# Patient Record
Sex: FEMALE | Race: WHITE | NOT HISPANIC OR LATINO | Employment: OTHER | URBAN - METROPOLITAN AREA
[De-identification: names, ages, dates, MRNs, and addresses within clinical notes are randomized per-mention and may not be internally consistent; named-entity substitution may affect disease eponyms.]

---

## 2017-02-01 ENCOUNTER — OFFICE VISIT (OUTPATIENT)
Dept: INTERNAL MEDICINE | Facility: CLINIC | Age: 62
End: 2017-02-01
Payer: MEDICARE

## 2017-02-01 VITALS
DIASTOLIC BLOOD PRESSURE: 72 MMHG | WEIGHT: 178.38 LBS | SYSTOLIC BLOOD PRESSURE: 114 MMHG | HEART RATE: 80 BPM | BODY MASS INDEX: 28 KG/M2 | TEMPERATURE: 98 F | HEIGHT: 67 IN

## 2017-02-01 DIAGNOSIS — J20.9 ACUTE BRONCHITIS, UNSPECIFIED ORGANISM: Primary | ICD-10-CM

## 2017-02-01 PROCEDURE — 3078F DIAST BP <80 MM HG: CPT | Mod: S$GLB,,, | Performed by: INTERNAL MEDICINE

## 2017-02-01 PROCEDURE — 99214 OFFICE O/P EST MOD 30 MIN: CPT | Mod: 25,S$GLB,, | Performed by: INTERNAL MEDICINE

## 2017-02-01 PROCEDURE — 3074F SYST BP LT 130 MM HG: CPT | Mod: S$GLB,,, | Performed by: INTERNAL MEDICINE

## 2017-02-01 PROCEDURE — 96372 THER/PROPH/DIAG INJ SC/IM: CPT | Mod: S$GLB,,, | Performed by: INTERNAL MEDICINE

## 2017-02-01 PROCEDURE — 99999 PR PBB SHADOW E&M-EST. PATIENT-LVL IV: CPT | Mod: PBBFAC,,, | Performed by: INTERNAL MEDICINE

## 2017-02-01 RX ORDER — CODEINE PHOSPHATE AND GUAIFENESIN 10; 100 MG/5ML; MG/5ML
5 SOLUTION ORAL EVERY 6 HOURS PRN
Qty: 120 ML | Refills: 0 | Status: SHIPPED | OUTPATIENT
Start: 2017-02-01 | End: 2017-02-11

## 2017-02-01 RX ORDER — TRIAMCINOLONE ACETONIDE 40 MG/ML
40 INJECTION, SUSPENSION INTRA-ARTICULAR; INTRAMUSCULAR
Status: COMPLETED | OUTPATIENT
Start: 2017-02-01 | End: 2017-02-01

## 2017-02-01 RX ORDER — ALBUTEROL SULFATE 90 UG/1
2 AEROSOL, METERED RESPIRATORY (INHALATION) EVERY 6 HOURS PRN
Qty: 1 EACH | Refills: 1 | Status: SHIPPED | OUTPATIENT
Start: 2017-02-01 | End: 2018-06-28 | Stop reason: CLARIF

## 2017-02-01 RX ORDER — PREDNISONE 20 MG/1
TABLET ORAL
Qty: 12 TABLET | Refills: 0 | Status: SHIPPED | OUTPATIENT
Start: 2017-02-01 | End: 2017-03-21

## 2017-02-01 RX ORDER — LAMOTRIGINE 25 MG/1
200 TABLET ORAL 2 TIMES DAILY
COMMUNITY
Start: 2017-01-30 | End: 2018-06-28

## 2017-02-01 RX ADMIN — TRIAMCINOLONE ACETONIDE 40 MG: 40 INJECTION, SUSPENSION INTRA-ARTICULAR; INTRAMUSCULAR at 04:02

## 2017-02-01 NOTE — MR AVS SNAPSHOT
[facility] [address]  [address]  [address]  [facility] [address]  Phone: [phone]                  Elly Harris   2017 3:30 PM   Office Visit    Description:  Female : 1955   Provider:  Srikanth Preston MD   Department:  [facility] [address]           Reason for Visit     Cough           Diagnoses this Visit        Comments    Acute bronchitis, unspecified organism    -  Primary            To Do List           Future Appointments        Provider Department Dept Phone    2/15/2017 2:00 PM Srikanth Preston MD [facility] [address] [phone]      Goals (5 Years of Data)     None       These Medications        Disp Refills Start End    predniSONE (DELTASONE) 20 MG tablet 12 tablet 0 2017     2 pills po daily for 4 days, then 1 pill po day for 4 days    Pharmacy: [facility]/pharmacy #[id number] - [place] [address] [address] Ph #: [phone]       albuterol 90 mcg/actuation inhaler 1 each 1 2017     Inhale 2 puffs into the lungs every 6 (six) hours as needed for Wheezing. - Inhalation    Pharmacy: [facility]/pharmacy #[id number] - [place] - [address] Ph #: [phone]       guaifenesin-codeine 100-10 mg/5 ml (TUSSI-ORGANIDIN NR)  mg/5 mL syrup 120 mL 0 2017    Take 5 mLs by mouth every 6 (six) hours as needed for Cough. - Oral    Pharmacy: [facility]/pharmacy #[id number] - [place] [address] [address] Ph #: [phone]         [facility]     [facility] Nurse Care [facility] -  Assistance  Registered nurses in the [facility] On Call Center provide clinical advisement, health education, appointment booking, and other advisory services.  Call for this free service at [phone].             Medications           Message regarding Medications     Verify the changes and/or additions to your medication regime listed below are the same as discussed with your clinician today.  If any of these changes or additions are  incorrect, please notify your healthcare provider.        START taking these NEW medications        Refills    predniSONE (DELTASONE) 20 MG tablet 0    Si pills po daily for 4 days, then 1 pill po day for 4 days    Class: Normal    albuterol 90 mcg/actuation inhaler 1    Sig: Inhale 2 puffs into the lungs every 6 (six) hours as needed for Wheezing.    Class: Normal    Route: Inhalation    guaifenesin-codeine 100-10 mg/5 ml (TUSSI-ORGANIDIN NR)  mg/5 mL syrup 0    Sig: Take 5 mLs by mouth every 6 (six) hours as needed for Cough.    Class: Normal    Route: Oral      These medications were administered today        Dose Freq    triamcinolone acetonide injection 40 mg 40 mg Clinic/HOD 1 time    Sig: Inject 1 mL (40 mg total) into the muscle one time.    Class: Normal    Route: Intramuscular      STOP taking these medications     hydrocodone-acetaminophen 10-325mg (NORCO)  mg Tab Take 1 tablet by mouth every 6 (six) hours as needed.    ranitidine (ZANTAC) 150 MG tablet Take 150 mg by mouth 2 (two) times daily.           Verify that the below list of medications is an accurate representation of the medications you are currently taking.  If none reported, the list may be blank. If incorrect, please contact your healthcare provider. Carry this list with you in case of emergency.           Current Medications     albuterol 90 mcg/actuation inhaler Inhale 2 puffs into the lungs every 6 (six) hours as needed for Wheezing.    atorvastatin (LIPITOR) 20 MG tablet Take 1 tablet (20 mg total) by mouth once daily.    azelastine (ASTELIN) 137 mcg nasal spray 1 SPRAY (137 MCG TOTAL) BY NASAL ROUTE 2 (TWO) TIMES DAILY.    cyclobenzaprine (FLEXERIL) 10 MG tablet Take 10 mg by mouth 3 (three) times daily as needed.     duloxetine (CYMBALTA) 60 MG capsule Take 120 mg by mouth once daily.    estradiol (ESTRACE) 1 MG tablet Take 1 tablet (1 mg total) by mouth once daily.    folic acid (FOLVITE) 1 MG tablet Take 1 mg by mouth  "once daily.    gabapentin (NEURONTIN) 300 MG capsule Take 1 capsule (300 mg total) by mouth 3 (three) times daily.    guaifenesin-codeine 100-10 mg/5 ml (TUSSI-ORGANIDIN NR)  mg/5 mL syrup Take 5 mLs by mouth every 6 (six) hours as needed for Cough.    ibandronate (BONIVA) 150 mg tablet Take 1 tablet (150 mg total) by mouth every 30 days.    lamotrigine (LAMICTAL) 25 MG tablet Take 200 mg by mouth 2 (two) times daily.     lorazepam (ATIVAN) 0.5 MG tablet Take 1 tablet (0.5 mg total) by mouth daily as needed.    losartan-hydrochlorothiazide 50-12.5 mg (HYZAAR) 50-12.5 mg per tablet TAKE 1 TABLET BY MOUTH ONCE DAILY.    losartan-hydrochlorothiazide 50-12.5 mg (HYZAAR) 50-12.5 mg per tablet TAKE 1 TABLET EVERY DAY    losartan-hydrochlorothiazide 50-12.5 mg (HYZAAR) 50-12.5 mg per tablet Take 1 tablet by mouth once daily.    naproxen (NAPROSYN) 500 MG tablet TAKE 1 TABLET (500 MG TOTAL) BY MOUTH 2 (TWO) TIMES DAILY WITH MEALS.    NEXIUM 40 mg capsule Take 40 mg by mouth once daily.    predniSONE (DELTASONE) 20 MG tablet 2 pills po daily for 4 days, then 1 pill po day for 4 days    sumatriptan (IMITREX) 100 MG tablet Take 1 tablet (100 mg total) by mouth every 2 (two) hours as needed for Migraine.    tobramycin-dexamethasone 0.3-0.1% (TOBRADEX) 0.3-0.1 % DrpS Place 1 drop into the right eye every 4 (four) hours while awake.    valacyclovir (VALTREX) 1000 MG tablet Take 1 tablet (1,000 mg total) by mouth 3 (three) times daily.           Clinical Reference Information           Vital Signs - Last Recorded  Most recent update: 2/1/2017  3:49 PM by Andrea Abbott MA    BP Pulse Temp Ht Wt BMI    114/72 (BP Location: Left arm, Patient Position: Sitting, BP Method: Automatic) 80 97.7 °F (36.5 °C) (Oral) 5' 6.5" (1.689 m) 80.9 kg (178 lb 6.4 oz) 28.36 kg/m2      Blood Pressure          Most Recent Value    BP  114/72      Allergies as of 2/1/2017     Doxycycline      Immunizations Administered on Date of Encounter - " 2/1/2017     None

## 2017-02-23 RX ORDER — ESTRADIOL 1 MG/1
1 TABLET ORAL DAILY
Qty: 30 TABLET | Refills: 11 | Status: SHIPPED | OUTPATIENT
Start: 2017-02-23 | End: 2017-04-28 | Stop reason: SDUPTHER

## 2017-03-21 ENCOUNTER — OFFICE VISIT (OUTPATIENT)
Dept: INTERNAL MEDICINE | Facility: CLINIC | Age: 62
End: 2017-03-21
Payer: MEDICARE

## 2017-03-21 VITALS
HEART RATE: 84 BPM | HEIGHT: 66 IN | SYSTOLIC BLOOD PRESSURE: 102 MMHG | WEIGHT: 185 LBS | DIASTOLIC BLOOD PRESSURE: 64 MMHG | BODY MASS INDEX: 29.73 KG/M2

## 2017-03-21 DIAGNOSIS — N39.46 MIXED STRESS AND URGE URINARY INCONTINENCE: ICD-10-CM

## 2017-03-21 DIAGNOSIS — M47.26 OSTEOARTHRITIS OF SPINE WITH RADICULOPATHY, LUMBAR REGION: Primary | ICD-10-CM

## 2017-03-21 DIAGNOSIS — R20.0 NUMBNESS OF LEFT FOOT: ICD-10-CM

## 2017-03-21 DIAGNOSIS — R39.15 URINARY URGENCY: ICD-10-CM

## 2017-03-21 LAB
BACTERIA #/AREA URNS AUTO: NORMAL /HPF
BILIRUB UR QL STRIP: NEGATIVE
CAOX CRY UR QL COMP ASSIST: NORMAL
CLARITY UR REFRACT.AUTO: ABNORMAL
COLOR UR AUTO: YELLOW
GLUCOSE UR QL STRIP: NEGATIVE
HGB UR QL STRIP: NEGATIVE
HYALINE CASTS UR QL AUTO: 1 /LPF
KETONES UR QL STRIP: NEGATIVE
LEUKOCYTE ESTERASE UR QL STRIP: ABNORMAL
MICROSCOPIC COMMENT: NORMAL
NITRITE UR QL STRIP: NEGATIVE
PH UR STRIP: 5 [PH] (ref 5–8)
PROT UR QL STRIP: NEGATIVE
RBC #/AREA URNS AUTO: 2 /HPF (ref 0–4)
SP GR UR STRIP: 1.03 (ref 1–1.03)
SQUAMOUS #/AREA URNS AUTO: 3 /HPF
URN SPEC COLLECT METH UR: ABNORMAL
UROBILINOGEN UR STRIP-ACNC: NEGATIVE EU/DL
WBC #/AREA URNS AUTO: 4 /HPF (ref 0–5)

## 2017-03-21 PROCEDURE — 3074F SYST BP LT 130 MM HG: CPT | Mod: S$GLB,,, | Performed by: INTERNAL MEDICINE

## 2017-03-21 PROCEDURE — 1160F RVW MEDS BY RX/DR IN RCRD: CPT | Mod: S$GLB,,, | Performed by: INTERNAL MEDICINE

## 2017-03-21 PROCEDURE — 87086 URINE CULTURE/COLONY COUNT: CPT

## 2017-03-21 PROCEDURE — 81001 URINALYSIS AUTO W/SCOPE: CPT

## 2017-03-21 PROCEDURE — 99214 OFFICE O/P EST MOD 30 MIN: CPT | Mod: S$GLB,,, | Performed by: INTERNAL MEDICINE

## 2017-03-21 PROCEDURE — 99999 PR PBB SHADOW E&M-EST. PATIENT-LVL V: CPT | Mod: PBBFAC,,, | Performed by: INTERNAL MEDICINE

## 2017-03-21 PROCEDURE — 3078F DIAST BP <80 MM HG: CPT | Mod: S$GLB,,, | Performed by: INTERNAL MEDICINE

## 2017-03-21 RX ORDER — RISPERIDONE 1 MG/1
1 TABLET ORAL NIGHTLY
Refills: 0 | COMMUNITY
Start: 2017-03-09 | End: 2021-02-25

## 2017-03-21 NOTE — MR AVS SNAPSHOT
[facility] [address]  [address]  [address]  [facility] [address]  Phone: [phone]                  Elly Harris   3/21/2017 3:45 PM   Office Visit    Description:  Female : 1955   Provider:  Srikanth Preston MD   Department:  [facility] [address]           Reason for Visit     Back Pain     Numbness     Urinary Frequency           Diagnoses this Visit        Comments    Osteoarthritis of spine with radiculopathy, lumbar region    -  Primary     Numbness of left foot         Urinary urgency         Mixed stress and urge urinary incontinence                To Do List           Goals (5 Years of Data)     None      [facility]     [facility] Nurse Care Line -  Assistance  Registered nurses in the [facility] On Call Center provide clinical advisement, health education, appointment booking, and other advisory services.  Call for this free service at [phone].             Medications           Message regarding Medications     Verify the changes and/or additions to your medication regime listed below are the same as discussed with your clinician today.  If any of these changes or additions are incorrect, please notify your healthcare provider.        STOP taking these medications     predniSONE (DELTASONE) 20 MG tablet 2 pills po daily for 4 days, then 1 pill po day for 4 days           Verify that the below list of medications is an accurate representation of the medications you are currently taking.  If none reported, the list may be blank. If incorrect, please contact your healthcare provider. Carry this list with you in case of emergency.           Current Medications     albuterol 90 mcg/actuation inhaler Inhale 2 puffs into the lungs every 6 (six) hours as needed for Wheezing.    atorvastatin (LIPITOR) 20 MG tablet Take 1 tablet (20 mg total) by mouth once daily.    azelastine (ASTELIN) 137 mcg nasal spray 1 SPRAY (137 MCG TOTAL) BY NASAL  "ROUTE 2 (TWO) TIMES DAILY.    cyclobenzaprine (FLEXERIL) 10 MG tablet Take 10 mg by mouth 3 (three) times daily as needed.     duloxetine (CYMBALTA) 60 MG capsule Take 120 mg by mouth once daily.    estradiol (ESTRACE) 1 MG tablet Take 1 tablet (1 mg total) by mouth once daily.    folic acid (FOLVITE) 1 MG tablet Take 1 mg by mouth once daily.    gabapentin (NEURONTIN) 300 MG capsule Take 1 capsule (300 mg total) by mouth 3 (three) times daily.    ibandronate (BONIVA) 150 mg tablet Take 1 tablet (150 mg total) by mouth every 30 days.    lamotrigine (LAMICTAL) 25 MG tablet Take 200 mg by mouth 2 (two) times daily.     lorazepam (ATIVAN) 0.5 MG tablet Take 1 tablet (0.5 mg total) by mouth daily as needed.    losartan-hydrochlorothiazide 50-12.5 mg (HYZAAR) 50-12.5 mg per tablet TAKE 1 TABLET BY MOUTH ONCE DAILY.    losartan-hydrochlorothiazide 50-12.5 mg (HYZAAR) 50-12.5 mg per tablet TAKE 1 TABLET EVERY DAY    losartan-hydrochlorothiazide 50-12.5 mg (HYZAAR) 50-12.5 mg per tablet Take 1 tablet by mouth once daily.    naproxen (NAPROSYN) 500 MG tablet TAKE 1 TABLET (500 MG TOTAL) BY MOUTH 2 (TWO) TIMES DAILY WITH MEALS.    NEXIUM 40 mg capsule Take 40 mg by mouth once daily.    ranitidine (ZANTAC) 150 MG tablet Take 150 mg by mouth 2 (two) times daily.    risperidone (RISPERDAL) 1 MG tablet Take 1 mg by mouth every evening.    sumatriptan (IMITREX) 100 MG tablet Take 1 tablet (100 mg total) by mouth every 2 (two) hours as needed for Migraine.    tobramycin-dexamethasone 0.3-0.1% (TOBRADEX) 0.3-0.1 % DrpS Place 1 drop into the right eye every 4 (four) hours while awake.    valacyclovir (VALTREX) 1000 MG tablet Take 1 tablet (1,000 mg total) by mouth 3 (three) times daily.           Clinical Reference Information           Your Vitals Were     BP Pulse Height Weight BMI    102/64 84 5' 6" (1.676 m) 83.9 kg (185 lb) 29.86 kg/m2      Blood Pressure          Most Recent Value    BP  102/64      Allergies as of 3/21/2017  "    Doxycycline      Immunizations Administered on Date of Encounter - 3/21/2017     None      Orders Placed During Today's Visit      Normal Orders This Visit    Ambulatory Referral to Urogynecology     Urinalysis     Urine culture     Future Labs/Procedures Expected by Expires    EMG w/ Ultrasound  As directed 6/19/2017      Language Assistance Services     ATTENTION: Language assistance services are available, free of charge. Please call [phone].      ATENCIÓN: Si habla español, tiene a guaman disposición servicios gratuitos de asistencia lingüística. Llame al [phone].     [facility] Ý: N?u b?n nói Ti?ng Vi?t, có các d?ch v? h? tr? ngôn ng? mi?n phí dành cho b?n. G?i s? [phone].         [facility] [address] [facility] [address] complies with applicable Federal civil rights laws and does not discriminate on the basis of race, color, national origin, age, disability, or sex.
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
## 2017-03-23 LAB — BACTERIA UR CULT: NORMAL

## 2017-03-25 ENCOUNTER — PATIENT MESSAGE (OUTPATIENT)
Dept: INTERNAL MEDICINE | Facility: CLINIC | Age: 62
End: 2017-03-25

## 2017-03-27 ENCOUNTER — PATIENT MESSAGE (OUTPATIENT)
Dept: INTERNAL MEDICINE | Facility: CLINIC | Age: 62
End: 2017-03-27

## 2017-03-27 DIAGNOSIS — N39.46 MIXED STRESS AND URGE URINARY INCONTINENCE: ICD-10-CM

## 2017-03-27 DIAGNOSIS — R39.15 URINARY URGENCY: Primary | ICD-10-CM

## 2017-05-01 ENCOUNTER — PATIENT MESSAGE (OUTPATIENT)
Dept: INTERNAL MEDICINE | Facility: CLINIC | Age: 62
End: 2017-05-01

## 2017-05-01 RX ORDER — SUMATRIPTAN SUCCINATE 100 MG/1
TABLET ORAL
Qty: 9 TABLET | Refills: 3 | Status: SHIPPED | OUTPATIENT
Start: 2017-05-01 | End: 2018-09-09 | Stop reason: SDUPTHER

## 2017-05-01 RX ORDER — ESTRADIOL 1 MG/1
1 TABLET ORAL DAILY
Qty: 30 TABLET | Refills: 11 | Status: SHIPPED | OUTPATIENT
Start: 2017-05-01 | End: 2018-06-28 | Stop reason: CLARIF

## 2017-05-03 ENCOUNTER — PATIENT MESSAGE (OUTPATIENT)
Dept: INTERNAL MEDICINE | Facility: CLINIC | Age: 62
End: 2017-05-03

## 2017-07-11 ENCOUNTER — OFFICE VISIT (OUTPATIENT)
Dept: URGENT CARE | Facility: CLINIC | Age: 62
End: 2017-07-11
Payer: MEDICARE

## 2017-07-11 VITALS
RESPIRATION RATE: 16 BRPM | HEIGHT: 67 IN | OXYGEN SATURATION: 98 % | DIASTOLIC BLOOD PRESSURE: 77 MMHG | TEMPERATURE: 98 F | WEIGHT: 185 LBS | HEART RATE: 89 BPM | SYSTOLIC BLOOD PRESSURE: 123 MMHG | BODY MASS INDEX: 29.03 KG/M2

## 2017-07-11 DIAGNOSIS — S46.911A SHOULDER STRAIN, RIGHT, INITIAL ENCOUNTER: ICD-10-CM

## 2017-07-11 DIAGNOSIS — S93.601A SPRAIN OF FOOT, RIGHT, INITIAL ENCOUNTER: ICD-10-CM

## 2017-07-11 DIAGNOSIS — S82.892A: Primary | ICD-10-CM

## 2017-07-11 PROCEDURE — 99203 OFFICE O/P NEW LOW 30 MIN: CPT | Mod: S$GLB,,, | Performed by: PHYSICIAN ASSISTANT

## 2017-07-11 RX ORDER — HYDROCODONE BITARTRATE AND ACETAMINOPHEN 5; 325 MG/1; MG/1
1 TABLET ORAL EVERY 6 HOURS PRN
Qty: 20 TABLET | Refills: 0 | Status: SHIPPED | OUTPATIENT
Start: 2017-07-11 | End: 2021-02-25

## 2017-07-11 NOTE — PROGRESS NOTES
Subjective:       Patient ID: Elly Harris is a 62 y.o. female.    Chief Complaint: Shoulder Injury; Ankle Injury; and Foot Injury    Pt fell down 2 steps. Reports pain to the right shoulder/upper arm, right foot and left ankle and foot. Denies hitting head or LOC.       Shoulder Injury    The incident occurred at home. The right shoulder is affected. The incident occurred 6 to 12 hours ago. The injury mechanism was a fall. The quality of the pain is described as aching and shooting. The pain radiates to the right arm. The pain is at a severity of 4/10. The pain is mild. Associated symptoms include muscle weakness. Pertinent negatives include no numbness or tingling. The symptoms are aggravated by movement. She has tried ice for the symptoms.   Ankle Injury    Associated symptoms include muscle weakness. Pertinent negatives include no numbness or tingling.   Foot Injury    Associated symptoms include muscle weakness. Pertinent negatives include no numbness or tingling.     Review of Systems   Cardiovascular: Negative for syncope.   Skin: Positive for color change.   Musculoskeletal: Positive for falls, joint pain (left ankle, foot; right foot and right shoulder), joint swelling and stiffness. Negative for back pain.   Neurological: Negative for dizziness, focal weakness, light-headedness, loss of balance, numbness and tingling.       Objective:      Physical Exam   Constitutional: She appears well-developed and well-nourished.   HENT:   Head: Normocephalic and atraumatic.   Neck: Normal range of motion. Neck supple.   Cardiovascular: Normal rate, regular rhythm, normal heart sounds and intact distal pulses.    Pulmonary/Chest: Effort normal and breath sounds normal.   Musculoskeletal: She exhibits edema.        Right shoulder: She exhibits decreased range of motion, tenderness and pain. She exhibits no swelling, no effusion, no crepitus, no deformity and no laceration.        Right ankle: She exhibits decreased  range of motion. She exhibits no swelling and no ecchymosis. Tenderness.        Left ankle: She exhibits decreased range of motion, swelling and ecchymosis. Tenderness.   Skin: Skin is warm and dry.       Assessment:       1. Fracture of malleolus of left ankle, closed, initial encounter    2. Shoulder strain, right, initial encounter    3. Sprain of foot, right, initial encounter        Plan:       Fracture of malleolus of left ankle, closed, initial encounter  -     X-Ray Foot Complete Left; Future; Expected date: 07/11/2017  -     X-Ray Ankle Complete Left; Future; Expected date: 07/11/2017  -     X-Ray Foot 2 View Right; Future; Expected date: 07/11/2017  -     POST-SURGICAL BOOT/SHOE FOR HOME USE    Shoulder strain, right, initial encounter  -     X-Ray Shoulder Trauma 3 view Right; Future; Expected date: 07/11/2017    Sprain of foot, right, initial encounter    Other orders  -     hydrocodone-acetaminophen 5-325mg (NORCO) 5-325 mg per tablet; Take 1 tablet by mouth every 6 (six) hours as needed for Pain.  Dispense: 20 tablet; Refill: 0

## 2017-07-11 NOTE — PATIENT INSTRUCTIONS
Leg Fracture    You have a break (fracture) of the leg. A fracture is treated with a splint, cast, or special boot. It will take at about 4 to 6 weeks for the fracture to heal. If you have a severe injury, you may need surgery to fix it.  Home care  Follow these guidelines when caring for yourself at home:  · You will be given a splint, cast, boot, or other device to keep the injured area from moving. Unless you were told otherwise, use crutches or a walker. Dont put weight on the injured leg until your health care provider says you can do so. (You can rent crutches and a walker at many pharmacies and surgical or orthopedic supply stores.)  · Keep your leg elevated to reduce pain and swelling. When sleeping, put a pillow under the injured leg. When sitting, support the injured leg so it is level with your waist. This is very important during the first 2 days (48 hours).  · Put an ice pack on the injured area. Do this for 20 minutes every 1 to 2 hours the first day for pain relief. You can make an ice pack by wrapping a plastic bag of ice cubes in a thin towel. As the ice melts, be careful that the cast/splint/boot doesnt get wet. You can put the ice pack directly over the splint or cast. Continue using the ice pack 3 to 4 times a day for the next 2 days. Then use the ice pack as needed to ease pain and swelling.  · Keep the cast/splint/boot completely dry at all times. Bathe with your cast/splint/boot out of the water. Protect it with a large plastic bag, rubber-banded at the top end. If a boot or fiberglass cast or splint gets wet, you can dry it with a hair dryer.  · You may use acetaminophen or ibuprofen to control pain, unless another pain medicine was prescribed. If you have chronic liver or kidney disease, talk with your health care provider before using these medicines. Also talk with your provider if youve had a stomach ulcer or GI bleeding.  · Dont put creams or objects under the cast if you have  itching.  Follow-up care  Follow up with your health care provider in 1 week, or as advised. This is to make sure the bone is healing the way it should. If a splint was put on, it may be converted to a cast at your next visit.  If X-rays were taken, a radiologist will look at them. You will be told of any new findings that may affect your care.  When to seek medical advice  Call your health care provider right away if any of these occur:  · The cast cracks  · The plaster cast or splint becomes wet or soft  · The fiberglass cast or splint stays wet for more than 24 hours  · Bad odor from the cast or wound fluid stains the cast  · Tightness or pain under the cast or splint gets worse  · Toes become swollen, cold, blue, numb, or tingly  · You cant move your toes  · Skin around cast becomes red  · Fever of 101ºF (38.3ºC) or higher, or as directed by your health care provider  Date Last Reviewed: 2/15/2015  © 8357-7157 [employer]. [address] [place] [address]. All rights reserved. This information is not intended as a substitute for professional medical care. Always follow your healthcare professional's instructions.        Shoulder Sprain  A sprain is a stretching or tearing of the ligaments that hold a joint together. A sprain may take up to 8 weeks to fully heal, depending on how severe it is. Moderate to severe shoulder sprains are treated with a sling or shoulder immobilizer. Minor sprains can be treated without any special support.  Home care  The following guidelines will help you care for your injury at home:  · If a sling was given to you, leave it in place for the time advised by your healthcare provider. If you arent sure how long to wear it, ask for advice. If the sling becomes loose, adjust it so that your forearm is level with the ground. Your shoulder should feel well supported.  · Put an ice pack on the injured area for 20 minutes every 1 to 2 hours the first day. You can  make your own ice pack by putting ice cubes in a plastic bag. A bag of frozen peas or something similar works well too. Wrap the bag in a thin towel. Continue with ice packs 3 to 4 times a day for the next 2 to 3 days. Then use the pack as needed to ease pain and swelling.  · You may use acetaminophen or ibuprofen to control pain, unless another pain medicine was prescribed. If you have chronic liver or kidney disease, talk with your healthcare provider before using these medicines. Also talk with your provider if youve had a stomach ulcer or gastrointestinal bleeding.  · Shoulder joints become stiff if left in a sling for too long. You should start range of motion exercises about 7 to 10 days after the injury. Talk with your provider to find out what type of exercises to do and how soon to start.  Follow-up care  Follow up with your healthcare provider, or as advised.  Any X-rays you had today dont show any broken bones, breaks, or fractures. Sometimes fractures dont show up on the first X-ray. Bruises and sprains can sometimes hurt as much as a fracture. These injuries can take time to heal completely. If your symptoms dont improve or they get worse, talk with your provider. You may need a repeat X-ray or other treatments.  When to seek medical advice  Call your healthcare provider right away if any of these occur:  · Shoulder pain or swelling in your arm that gets worse  · Fingers become cold, blue, numb, or tingly  · Large amount of bruising of the shoulder or upper arm  · Fever or chills  Date Last Reviewed: 8/1/2016 © 2000-2016 [employer]. [address] [place] [address], [place] [address]. All rights reserved. This information is not intended as a substitute for professional medical care. Always follow your healthcare professional's instructions.        Foot Sprain    A sprain is a stretching or tearing of the ligaments that hold a joint together. There are no broken bones. Sprains generally take  from 3-6 weeks to heal. A sprain may be treated with a splint, walking cast, or special boot. Mild sprains may not need any additional support.  Home care  The following guidelines will help you care for your injury at home:  · Keep your leg elevated when sitting or lying down. This is very important during the first 48 hours to reduce swelling. Stay off the injured foot as much as possible until you can walk on it without pain. If needed, you may use crutches during the first week for this purpose. Crutches can be rented at many pharmacies or surgical/orthopedic supply stores.  · You may be given a cast shoe to wear to prevent movement in your foot. If not, you can use a sandal or any shoe that does not put pressure on the injured area until the swelling and pain go away. If using a sandal, be careful not to hit your foot against anything, since another injury could make the sprain worse.  · Apply an ice pack over the injured area for 15 to 20 minutes every 3 to 6 hours. You should do this for the first 24 to 48 hours. You can make an ice pack by filling a plastic bag that seals at the top with ice cubes and then wrapping it with a thin towel. Continue to use ice packs for relief of pain and swelling as needed. As the ice melts, avoid getting any wrap, splint, or cast wet. After 48 hours, apply heat from a warm shower or bath for 20 minutes several times daily. Alternating ice and heat may also be helpful.  · You may use over-the-counter pain medicine to control pain, unless another medicine was prescribed. If you have chronic liver or kidney disease or ever had a stomach ulcer or GI bleeding, talk with your healthcare provider before using these medicines.  · If you were given a splint or cast, keep it dry. Bathe with your splint or cast well out of the water, protected with 2 large plastic bags, rubber-banded at the top end. If a fiberglass splint or cast gets wet, you can dry it with a hair dryer.  · You may  return to sports after healing, when you can run without pain.  Follow-up care  Follow up with your healthcare provider as directed. Sometimes fractures dont show up on the first X-ray. Bruises and sprains can sometimes hurt as much as a fracture. These injuries can take time to heal completely. If your symptoms dont improve or they get worse, talk with your healthcare provider. You may need a repeat X-ray.  When to seek medical advice  Call your healthcare provider right away if any of these occur:  · The plaster cast or splint gets wet or soft  · The fiberglass cast or splint gets wet and does not dry for 24 hours  · Pain or swelling increases, or redness appears  · A bad odor comes from within the cast  · Fever of 100.4°F (38°C) or above lasting for 24 to 48 hours  · Toes on the injured foot become cold, blue, numb, or tingly  Date Last Reviewed: 11/20/2015  © 8341-1358 [employer]. [address] [place] [address], [place] [address]. All rights reserved. This information is not intended as a substitute for professional medical care. Always follow your healthcare professional's instructions.

## 2017-07-23 RX ORDER — LOSARTAN POTASSIUM AND HYDROCHLOROTHIAZIDE 12.5; 5 MG/1; MG/1
TABLET ORAL
Qty: 30 TABLET | Refills: 5 | Status: SHIPPED | OUTPATIENT
Start: 2017-07-23 | End: 2017-08-28 | Stop reason: SDUPTHER

## 2017-08-29 RX ORDER — LOSARTAN POTASSIUM AND HYDROCHLOROTHIAZIDE 12.5; 5 MG/1; MG/1
1 TABLET ORAL DAILY
Qty: 90 TABLET | Refills: 1 | Status: SHIPPED | OUTPATIENT
Start: 2017-08-29 | End: 2018-06-28 | Stop reason: CLARIF

## 2017-08-29 RX ORDER — NAPROXEN 500 MG/1
TABLET ORAL
Qty: 120 TABLET | Refills: 0 | Status: SHIPPED | OUTPATIENT
Start: 2017-08-29 | End: 2017-08-31 | Stop reason: SDUPTHER

## 2017-08-31 RX ORDER — NAPROXEN 500 MG/1
TABLET ORAL
Qty: 180 TABLET | Refills: 0 | Status: SHIPPED | OUTPATIENT
Start: 2017-08-31 | End: 2018-03-22 | Stop reason: SDUPTHER

## 2017-10-17 RX ORDER — GABAPENTIN 300 MG/1
300 CAPSULE ORAL 3 TIMES DAILY
Qty: 90 CAPSULE | Refills: 3 | Status: SHIPPED | OUTPATIENT
Start: 2017-10-17 | End: 2018-04-18 | Stop reason: SDUPTHER

## 2017-10-21 RX ORDER — ESOMEPRAZOLE MAGNESIUM 40 MG/1
CAPSULE, DELAYED RELEASE ORAL
Qty: 30 CAPSULE | Refills: 6 | Status: SHIPPED | OUTPATIENT
Start: 2017-10-21 | End: 2018-07-16 | Stop reason: SDUPTHER

## 2018-01-02 RX ORDER — IBANDRONATE SODIUM 150 MG/1
TABLET, FILM COATED ORAL
Qty: 1 TABLET | Refills: 5 | Status: SHIPPED | OUTPATIENT
Start: 2018-01-02 | End: 2018-06-16 | Stop reason: SDUPTHER

## 2018-01-02 RX ORDER — ATORVASTATIN CALCIUM 20 MG/1
20 TABLET, FILM COATED ORAL DAILY
Qty: 30 TABLET | Refills: 5 | Status: SHIPPED | OUTPATIENT
Start: 2018-01-02 | End: 2018-06-14 | Stop reason: SDUPTHER

## 2018-03-09 ENCOUNTER — PATIENT MESSAGE (OUTPATIENT)
Dept: INTERNAL MEDICINE | Facility: CLINIC | Age: 63
End: 2018-03-09

## 2018-03-09 DIAGNOSIS — R32 URINARY INCONTINENCE, UNSPECIFIED TYPE: Primary | ICD-10-CM

## 2018-03-09 DIAGNOSIS — R35.1 NOCTURIA: ICD-10-CM

## 2018-03-22 RX ORDER — NAPROXEN 500 MG/1
TABLET ORAL
Qty: 180 TABLET | Refills: 0 | Status: SHIPPED | OUTPATIENT
Start: 2018-03-22 | End: 2018-06-29

## 2018-04-18 RX ORDER — GABAPENTIN 300 MG/1
300 CAPSULE ORAL 3 TIMES DAILY
Qty: 90 CAPSULE | Refills: 3 | Status: SHIPPED | OUTPATIENT
Start: 2018-04-18 | End: 2018-04-19 | Stop reason: SDUPTHER

## 2018-04-19 RX ORDER — GABAPENTIN 300 MG/1
300 CAPSULE ORAL 3 TIMES DAILY
Qty: 90 CAPSULE | Refills: 3 | Status: SHIPPED | OUTPATIENT
Start: 2018-04-19 | End: 2021-02-25

## 2018-05-05 RX ORDER — GABAPENTIN 300 MG/1
300 CAPSULE ORAL 3 TIMES DAILY
Qty: 90 CAPSULE | Refills: 2 | Status: SHIPPED | OUTPATIENT
Start: 2018-05-05 | End: 2018-11-02 | Stop reason: SDUPTHER

## 2018-06-14 RX ORDER — ATORVASTATIN CALCIUM 20 MG/1
20 TABLET, FILM COATED ORAL DAILY
Qty: 30 TABLET | Refills: 5 | Status: SHIPPED | OUTPATIENT
Start: 2018-06-14 | End: 2018-12-22 | Stop reason: SDUPTHER

## 2018-06-19 RX ORDER — IBANDRONATE SODIUM 150 MG/1
TABLET, FILM COATED ORAL
Qty: 1 TABLET | Refills: 3 | Status: SHIPPED | OUTPATIENT
Start: 2018-06-19 | End: 2018-10-09 | Stop reason: SDUPTHER

## 2018-06-28 ENCOUNTER — OFFICE VISIT (OUTPATIENT)
Dept: INTERNAL MEDICINE | Facility: CLINIC | Age: 63
End: 2018-06-28
Payer: MEDICARE

## 2018-06-28 ENCOUNTER — LAB VISIT (OUTPATIENT)
Dept: LAB | Facility: HOSPITAL | Age: 63
End: 2018-06-28
Attending: INTERNAL MEDICINE
Payer: MEDICARE

## 2018-06-28 ENCOUNTER — PATIENT MESSAGE (OUTPATIENT)
Dept: INTERNAL MEDICINE | Facility: CLINIC | Age: 63
End: 2018-06-28

## 2018-06-28 VITALS
DIASTOLIC BLOOD PRESSURE: 62 MMHG | BODY MASS INDEX: 32.18 KG/M2 | HEART RATE: 95 BPM | OXYGEN SATURATION: 98 % | WEIGHT: 205 LBS | SYSTOLIC BLOOD PRESSURE: 100 MMHG | HEIGHT: 67 IN

## 2018-06-28 DIAGNOSIS — M79.7 FIBROMYALGIA: ICD-10-CM

## 2018-06-28 DIAGNOSIS — Z79.899 OTHER LONG TERM (CURRENT) DRUG THERAPY: ICD-10-CM

## 2018-06-28 DIAGNOSIS — F32.A DEPRESSION, UNSPECIFIED DEPRESSION TYPE: ICD-10-CM

## 2018-06-28 DIAGNOSIS — I10 ESSENTIAL HYPERTENSION: ICD-10-CM

## 2018-06-28 DIAGNOSIS — Z01.810 PREOP CARDIOVASCULAR EXAM: ICD-10-CM

## 2018-06-28 DIAGNOSIS — D50.9 MICROCYTIC ANEMIA: ICD-10-CM

## 2018-06-28 DIAGNOSIS — G43.909 MIGRAINE WITHOUT STATUS MIGRAINOSUS, NOT INTRACTABLE, UNSPECIFIED MIGRAINE TYPE: ICD-10-CM

## 2018-06-28 DIAGNOSIS — N39.46 MIXED STRESS AND URGE URINARY INCONTINENCE: ICD-10-CM

## 2018-06-28 DIAGNOSIS — E78.5 HYPERLIPIDEMIA, UNSPECIFIED HYPERLIPIDEMIA TYPE: ICD-10-CM

## 2018-06-28 DIAGNOSIS — K21.00 GASTROESOPHAGEAL REFLUX DISEASE WITH ESOPHAGITIS: ICD-10-CM

## 2018-06-28 DIAGNOSIS — I10 ESSENTIAL HYPERTENSION: Primary | ICD-10-CM

## 2018-06-28 LAB
25(OH)D3+25(OH)D2 SERPL-MCNC: 28 NG/ML
ALBUMIN SERPL BCP-MCNC: 3.5 G/DL
ALP SERPL-CCNC: 104 U/L
ALT SERPL W/O P-5'-P-CCNC: 13 U/L
ANION GAP SERPL CALC-SCNC: 7 MMOL/L
AST SERPL-CCNC: 17 U/L
BASOPHILS # BLD AUTO: 0.04 K/UL
BASOPHILS NFR BLD: 0.6 %
BILIRUB SERPL-MCNC: 0.2 MG/DL
BNP SERPL-MCNC: 107 PG/ML
BUN SERPL-MCNC: 20 MG/DL
CALCIUM SERPL-MCNC: 10 MG/DL
CHLORIDE SERPL-SCNC: 104 MMOL/L
CHOLEST SERPL-MCNC: 169 MG/DL
CHOLEST/HDLC SERPL: 3.8 {RATIO}
CO2 SERPL-SCNC: 31 MMOL/L
CREAT SERPL-MCNC: 1.2 MG/DL
DIFFERENTIAL METHOD: ABNORMAL
EOSINOPHIL # BLD AUTO: 0.3 K/UL
EOSINOPHIL NFR BLD: 5 %
ERYTHROCYTE [DISTWIDTH] IN BLOOD BY AUTOMATED COUNT: 16.5 %
EST. GFR  (AFRICAN AMERICAN): 55.2 ML/MIN/1.73 M^2
EST. GFR  (NON AFRICAN AMERICAN): 47.9 ML/MIN/1.73 M^2
FERRITIN SERPL-MCNC: 10 NG/ML
GLUCOSE SERPL-MCNC: 89 MG/DL
HCT VFR BLD AUTO: 25.4 %
HDLC SERPL-MCNC: 45 MG/DL
HDLC SERPL: 26.6 %
HGB BLD-MCNC: 7.4 G/DL
IMM GRANULOCYTES # BLD AUTO: 0.04 K/UL
IMM GRANULOCYTES NFR BLD AUTO: 0.6 %
IRON SERPL-MCNC: 35 UG/DL
LDLC SERPL CALC-MCNC: 71.6 MG/DL
LYMPHOCYTES # BLD AUTO: 1 K/UL
LYMPHOCYTES NFR BLD: 16.1 %
MAGNESIUM SERPL-MCNC: 2 MG/DL
MCH RBC QN AUTO: 24.6 PG
MCHC RBC AUTO-ENTMCNC: 29.1 G/DL
MCV RBC AUTO: 84 FL
MONOCYTES # BLD AUTO: 0.5 K/UL
MONOCYTES NFR BLD: 7.8 %
NEUTROPHILS # BLD AUTO: 4.5 K/UL
NEUTROPHILS NFR BLD: 69.9 %
NONHDLC SERPL-MCNC: 124 MG/DL
NRBC BLD-RTO: 0 /100 WBC
PLATELET # BLD AUTO: 266 K/UL
PMV BLD AUTO: 10.7 FL
POTASSIUM SERPL-SCNC: 3.8 MMOL/L
PROT SERPL-MCNC: 6.4 G/DL
RBC # BLD AUTO: 3.01 M/UL
SATURATED IRON: 8 %
SODIUM SERPL-SCNC: 142 MMOL/L
TOTAL IRON BINDING CAPACITY: 426 UG/DL
TRANSFERRIN SERPL-MCNC: 288 MG/DL
TRIGL SERPL-MCNC: 262 MG/DL
TSH SERPL DL<=0.005 MIU/L-ACNC: 2.77 UIU/ML
VIT B12 SERPL-MCNC: 722 PG/ML
WBC # BLD AUTO: 6.4 K/UL

## 2018-06-28 PROCEDURE — 3074F SYST BP LT 130 MM HG: CPT | Mod: CPTII,S$GLB,, | Performed by: INTERNAL MEDICINE

## 2018-06-28 PROCEDURE — 82728 ASSAY OF FERRITIN: CPT

## 2018-06-28 PROCEDURE — 3078F DIAST BP <80 MM HG: CPT | Mod: CPTII,S$GLB,, | Performed by: INTERNAL MEDICINE

## 2018-06-28 PROCEDURE — 36415 COLL VENOUS BLD VENIPUNCTURE: CPT | Mod: PO

## 2018-06-28 PROCEDURE — 83735 ASSAY OF MAGNESIUM: CPT

## 2018-06-28 PROCEDURE — 85025 COMPLETE CBC W/AUTO DIFF WBC: CPT

## 2018-06-28 PROCEDURE — 82306 VITAMIN D 25 HYDROXY: CPT

## 2018-06-28 PROCEDURE — 83880 ASSAY OF NATRIURETIC PEPTIDE: CPT

## 2018-06-28 PROCEDURE — 82607 VITAMIN B-12: CPT

## 2018-06-28 PROCEDURE — 84443 ASSAY THYROID STIM HORMONE: CPT

## 2018-06-28 PROCEDURE — 83540 ASSAY OF IRON: CPT

## 2018-06-28 PROCEDURE — 93005 ELECTROCARDIOGRAM TRACING: CPT | Mod: S$GLB,,, | Performed by: INTERNAL MEDICINE

## 2018-06-28 PROCEDURE — 99214 OFFICE O/P EST MOD 30 MIN: CPT | Mod: S$GLB,,, | Performed by: INTERNAL MEDICINE

## 2018-06-28 PROCEDURE — 3008F BODY MASS INDEX DOCD: CPT | Mod: CPTII,S$GLB,, | Performed by: INTERNAL MEDICINE

## 2018-06-28 PROCEDURE — 80061 LIPID PANEL: CPT

## 2018-06-28 PROCEDURE — 99999 PR PBB SHADOW E&M-EST. PATIENT-LVL III: CPT | Mod: PBBFAC,,, | Performed by: INTERNAL MEDICINE

## 2018-06-28 PROCEDURE — 80053 COMPREHEN METABOLIC PANEL: CPT

## 2018-06-28 PROCEDURE — 93010 ELECTROCARDIOGRAM REPORT: CPT | Mod: S$GLB,,, | Performed by: INTERNAL MEDICINE

## 2018-06-28 RX ORDER — DULOXETIN HYDROCHLORIDE 30 MG/1
30 CAPSULE, DELAYED RELEASE ORAL DAILY
COMMUNITY
End: 2021-09-16

## 2018-06-28 RX ORDER — LAMOTRIGINE 200 MG/1
200 TABLET ORAL 2 TIMES DAILY
Status: ON HOLD | COMMUNITY
End: 2023-09-25 | Stop reason: SDUPTHER

## 2018-06-28 NOTE — PROGRESS NOTES
REASON FOR VISIT:  This is a 63-year-old female who is here with her daughter,   coming in for medical evaluation (she is scheduled to have right rotator cuff   repair on  by Dr. Savanna Iglesias).    PAST MEDICAL HISTORY:  Hypertension.  Hyperlipidemia.  Gastroesophageal reflux disease with history of dilatation.  Depression, anxiety.  Fibromyalgia.  Restless legs syndrome.  Migraine headache disorder.  Herpes zoster infection.  Anemia.    PAST SURGICAL HISTORY:  Complete hysterectomy.  Bilateral Carmichael neuroma neurectomies with hammertoe repair.  Tonsillectomy.  .  Right elbow surgery due to epicondylitis.  LASIK surgery in both eyes.  Osteotomies involving the mandibulomaxillary region of the face with chin   augmentation.  Right femur fracture with intramedullary neil placement.  Posttraumatic fractures involving her right ankle, left elbow and clavicle.    SOCIAL HISTORY:  Tobacco use, none.  Alcohol use none.    In , she was admitted at [facility] for jaw pain and left arm pain.  She   had a chemical stress test where reportedly it was abnormal or positive, thus   had a left heart catheterization, which was normal.    In February, she had a syncopal event that resulted in thoracic compression   fracture from a fall.  She was standing up and became dizzy, light-headed and   then was on the ground.  The carotid Doppler and CT of the head was unrevealing.    She was told that she might have been dehydrated.    MEDICATIONS:  Atorvastatin 20 mg daily.  Cyclobenzaprine 10 mg three times a day as needed, not daily.  Cymbalta 30 mg and 60 mg daily.  Nexium 40 mg daily.  Folic acid 1 mg daily.  Gabapentin 300 mg twice a day.  Hydrocodone as needed.  Boniva 150 mg once a month.  Ativan 0.5 mg once a day as needed, not every day.  Losartan HCT 50/12.5 mg daily.  Naprosyn 500 mg twice a day.  Ranitidine 150 mg twice a day.  Risperidone 1 mg at night.    SYSTEMS REVIEW:  Currently, reports no pains in  the chest, palpitations.  Has   chronic dyspnea on exertion.  There is no abdominal pain.  Regular bowel   function.  No difficulty urinating, but does have mixed incontinence at times.    No fever or chills.    PHYSICAL EXAMINATION:  VITAL SIGNS:  Weight is 205 pounds, pulse 92, blood pressure 100/62, pulse ox   97%.  NECK:  No thyromegaly.  No masses.  LUNGS:  Clear breath sounds, good effort.  HEART:  Regular rate and rhythm.  No murmurs or gallops.  ABDOMEN:  Active bowel sounds, soft, nontender.  No hepatosplenomegaly or   abdominal masses.  PULSES:  2+ carotid pulses.  No bruits.  2+ pedal pulses.  EXTREMITIES:  No edema.    IMPRESSION:  1.  Hypertension.  2.  Gastroesophageal reflux disease.  3.  Depression, anxiety.  4.  Fibromyalgia.  5.  Restless leg syndrome.  6.  Preop evaluation.    PLAN:  Get an EKG, routine labs today.  She is cleared for anesthesia and   surgery.  On the morning of surgery, she can put a hold on her losartan HCT and   resume postop.  Phone review to follow up and any adjustments on medications.    ADDENDUM:  She does admit that she will have ongoing reflux symptoms despite   taking Nexium and ranitidine.  Occasionally, there will be some dysphagia and   regurgitation still.  Afterwards, it was discussed to pursue an upper endoscopy   after the surgery.            /timo [id number] review        PHILIP/EVERETTE  dd: 06/28/2018 10:59:50 (CDT)  td: 06/28/2018 11:26:46 (CDT)  Doc ID   #[id number]  Job ID #[id number]    CC:

## 2018-06-28 NOTE — PROGRESS NOTES
Lab results reviewed     most noted hemoglobin A1c 7.4      this was discussed with the patient      she will get in touch with her endocrinologist Dr. Abdelrahman Jacobo about pursue of EGD and/or colonoscopy     she is to stop naproxen    Start Fergon one twice a day     until more information sorted out, she is not clear for anesthesia and surgery

## 2018-06-29 ENCOUNTER — PATIENT MESSAGE (OUTPATIENT)
Dept: INTERNAL MEDICINE | Facility: CLINIC | Age: 63
End: 2018-06-29

## 2018-06-29 DIAGNOSIS — D50.9 MICROCYTIC ANEMIA: Primary | ICD-10-CM

## 2018-06-29 NOTE — PROGRESS NOTES
I sent her a my [employer] message       recommending to take Fergon 3 times a day instead of twice a day and lets repeat a CBC this Monday

## 2018-06-29 NOTE — TELEPHONE ENCOUNTER
Pt states her surgery is pushed back until July 17 she was orderd to take Iron tablets 3 times daily , and will repeat CBC  July 13 with Avinash Lim

## 2018-07-03 RX ORDER — LOSARTAN POTASSIUM AND HYDROCHLOROTHIAZIDE 12.5; 5 MG/1; MG/1
1 TABLET ORAL DAILY
Qty: 90 TABLET | Refills: 1 | Status: SHIPPED | OUTPATIENT
Start: 2018-07-03 | End: 2018-12-10 | Stop reason: SDUPTHER

## 2018-07-09 RX ORDER — NAPROXEN 500 MG/1
TABLET ORAL
Qty: 180 TABLET | Refills: 1 | Status: SHIPPED | OUTPATIENT
Start: 2018-07-09 | End: 2019-02-15 | Stop reason: SDUPTHER

## 2018-07-20 RX ORDER — ESOMEPRAZOLE MAGNESIUM 40 MG/1
CAPSULE, DELAYED RELEASE ORAL
Qty: 30 CAPSULE | Refills: 6 | Status: SHIPPED | OUTPATIENT
Start: 2018-07-20 | End: 2019-03-25 | Stop reason: SDUPTHER

## 2018-07-20 RX ORDER — FOLIC ACID 1 MG/1
TABLET ORAL
Qty: 100 TABLET | Refills: 2 | Status: SHIPPED | OUTPATIENT
Start: 2018-07-20 | End: 2019-03-26 | Stop reason: SDUPTHER

## 2018-09-10 RX ORDER — SUMATRIPTAN SUCCINATE 100 MG/1
TABLET ORAL
Qty: 9 TABLET | Refills: 0 | Status: SHIPPED | OUTPATIENT
Start: 2018-09-10

## 2018-10-09 RX ORDER — IBANDRONATE SODIUM 150 MG/1
TABLET, FILM COATED ORAL
Qty: 1 TABLET | Refills: 3 | Status: SHIPPED | OUTPATIENT
Start: 2018-10-09 | End: 2019-02-24 | Stop reason: SDUPTHER

## 2018-11-02 RX ORDER — GABAPENTIN 300 MG/1
300 CAPSULE ORAL 3 TIMES DAILY
Qty: 90 CAPSULE | Refills: 2 | Status: SHIPPED | OUTPATIENT
Start: 2018-11-02 | End: 2019-03-08 | Stop reason: SDUPTHER

## 2018-12-10 RX ORDER — LOSARTAN POTASSIUM AND HYDROCHLOROTHIAZIDE 12.5; 5 MG/1; MG/1
1 TABLET ORAL DAILY
Qty: 90 TABLET | Refills: 1 | Status: SHIPPED | OUTPATIENT
Start: 2018-12-10 | End: 2019-12-17 | Stop reason: SDUPTHER

## 2018-12-24 RX ORDER — ATORVASTATIN CALCIUM 20 MG/1
20 TABLET, FILM COATED ORAL DAILY
Qty: 30 TABLET | Refills: 5 | Status: SHIPPED | OUTPATIENT
Start: 2018-12-24 | End: 2019-06-24 | Stop reason: SDUPTHER

## 2019-02-05 RX ORDER — NAPROXEN 500 MG/1
TABLET ORAL
Qty: 180 TABLET | Refills: 1 | Status: CANCELLED | OUTPATIENT
Start: 2019-02-05

## 2019-02-14 ENCOUNTER — PATIENT MESSAGE (OUTPATIENT)
Dept: INTERNAL MEDICINE | Facility: CLINIC | Age: 64
End: 2019-02-14

## 2019-02-15 RX ORDER — NAPROXEN 500 MG/1
TABLET ORAL
Qty: 180 TABLET | Refills: 1 | Status: SHIPPED | OUTPATIENT
Start: 2019-02-15 | End: 2021-02-25

## 2019-02-25 RX ORDER — IBANDRONATE SODIUM 150 MG/1
TABLET, FILM COATED ORAL
Qty: 1 TABLET | Refills: 3 | Status: SHIPPED | OUTPATIENT
Start: 2019-02-25 | End: 2021-02-25

## 2019-03-01 ENCOUNTER — PATIENT MESSAGE (OUTPATIENT)
Dept: INTERNAL MEDICINE | Facility: CLINIC | Age: 64
End: 2019-03-01

## 2019-03-08 RX ORDER — GABAPENTIN 300 MG/1
300 CAPSULE ORAL 3 TIMES DAILY
Qty: 270 CAPSULE | Refills: 1 | Status: SHIPPED | OUTPATIENT
Start: 2019-03-08 | End: 2021-02-25

## 2019-03-08 RX ORDER — DULOXETIN HYDROCHLORIDE 60 MG/1
60 CAPSULE, DELAYED RELEASE ORAL DAILY
Qty: 90 CAPSULE | Refills: 1 | Status: ON HOLD | OUTPATIENT
Start: 2019-03-08 | End: 2023-09-25 | Stop reason: SDUPTHER

## 2019-03-08 NOTE — TELEPHONE ENCOUNTER
----- Message from Alanis Marquez sent at 3/8/2019 10:41 AM CST -----  Contact: [facility] [phone] OPTION #3  RX request - refill or new RX.  Is this a refill or new RX:  Refill   RX name and strength: gabapentin (NEURONTIN) 300 MG capsule  duloxetine (CYMBALTA) 60 MG capsule    Local pharmacy or mail order pharmacy:  [facility] [phone] [facility]    Comments:  Please advise, thanks

## 2019-03-25 RX ORDER — ESOMEPRAZOLE MAGNESIUM 40 MG/1
CAPSULE, DELAYED RELEASE ORAL
Qty: 30 CAPSULE | Refills: 5 | Status: SHIPPED | OUTPATIENT
Start: 2019-03-25 | End: 2019-08-26 | Stop reason: SDUPTHER

## 2019-03-26 RX ORDER — FOLIC ACID 1 MG/1
TABLET ORAL
Qty: 100 TABLET | Refills: 1 | Status: SHIPPED | OUTPATIENT
Start: 2019-03-26 | End: 2019-09-21 | Stop reason: SDUPTHER

## 2019-03-30 RX ORDER — LOSARTAN POTASSIUM AND HYDROCHLOROTHIAZIDE 12.5; 5 MG/1; MG/1
TABLET ORAL
Qty: 30 TABLET | Refills: 11 | Status: SHIPPED | OUTPATIENT
Start: 2019-03-30 | End: 2020-03-23

## 2019-06-24 RX ORDER — ATORVASTATIN CALCIUM 20 MG/1
20 TABLET, FILM COATED ORAL DAILY
Qty: 90 TABLET | Refills: 1 | Status: SHIPPED | OUTPATIENT
Start: 2019-06-24 | End: 2020-02-07 | Stop reason: SDUPTHER

## 2019-08-26 RX ORDER — ESOMEPRAZOLE MAGNESIUM 40 MG/1
40 CAPSULE, DELAYED RELEASE ORAL DAILY
Qty: 30 CAPSULE | Refills: 5 | Status: SHIPPED | OUTPATIENT
Start: 2019-08-26

## 2019-09-21 RX ORDER — FOLIC ACID 1 MG/1
TABLET ORAL
Qty: 30 TABLET | Refills: 5 | Status: SHIPPED | OUTPATIENT
Start: 2019-09-21 | End: 2020-02-18

## 2019-10-16 NOTE — PROGRESS NOTES
PAST MEDICAL HISTORY:  Hypertension.  Hyperlipidemia.  Gastroesophageal reflux disease with history of dilatation.  Depression, anxiety.  Bipolar disorder  Fibromyalgia.  Restless legs syndrome.  Migraine headache disorder.  Herpes zoster infection.  Chronic iron deficiency anemia     PAST SURGICAL HISTORY:  Complete hysterectomy.  Bilateral Carmichael neuroma neurectomies with hammertoe repair.  Tonsillectomy.  .  Right elbow surgery due to epicondylitis.  LASIK surgery in both eyes.  Osteotomies involving the mandibulomaxillary region of the face with chin   augmentation.  Right femur fracture with intramedullary neil placement.  Posttraumatic fractures involving her right ankle, left elbow and clavicle.     SOCIAL HISTORY:  Tobacco use, none.  Alcohol use none.    FAMILY HISTORY:  Father is , had mesothelioma, hyperlipidemia.  Mother   is , Alzheimer's disease and hypertension.  One brother is alive, but   has a history of non-Hodgkin's lymphoma, COPD and heart disease.     SCREENING:  Reportedly her last colonoscopy and upper endoscopy was in    through Dr. Abdelrahman Jacobo, both being unrevealing.            REASON FOR VISIT:  This is a 64-year-old female who comes in for a routine   follow-up and annual visit.  However, on 10/11/2019, she was out of town in   [place] and had a fall walking down steps.  The lighting was poor.  She   lost her footing, fell off the second step, and landed on her left side with her   left arm bent inward.  She could not get up.  The Fire Department came and   brought her to the Emergency Room.  Through a CT scan, she was noted to have   three rib fractures and a known compression fracture at T8 and a compression   fracture at T10.  Oxycodone 5/325 four times a day was prescribed for five days.    She is still having significant pain over the left lateral side that extends   to the left thoracic region and left anterior lower rib cage.    Prior to that,  she states that she was otherwise feeling well, although she does   have chronic medical conditions.    She has a history of iron deficiency anemia and is seeing an outside physician,   hematologist, Dr. Becerra.  It appears within the past year and 2019, she may   have received 10 IV infusions.  She also stated that p.o. iron supplements did   not seem to be working.    She was also followed by Dr. Savanna Iglesias of Orthopedics for arthritis of various   joints.  She has been told of having rotator cuff tear, right shoulder.  She   was going to have surgery, but it got postponed because of significant anemia.    She has had an updated colonoscopy through Dr. Abdelrahman Jacobo, which was normal.    At present, she cannot tell me the exact date or year.  We know that the last   one reported was in 2014, but it appears that she has had one since then.    MEDICATIONS:  1. Acetaminophen 500 mg two tablets twice a day as needed.  2. Atorvastatin 20 mg daily.  3. Cyclobenzaprine 10 mg three times a day as needed.  4. Duloxetine 30 mg in the morning and 60 mg at night.  5. Nexium 40 mg daily.  6. Prozac 20 mg daily.  7. Folic acid 1 mg daily.  8. Boniva 150 mg once a month.  9. Lamictal 200 mg twice a day.  10. Ativan 0.5 mg one pill a day as needed.  12.  Losartan HCT 50/12.5 mg daily.  13.  Naproxen 500 mg one twice a day (she was actually not using that, but since   her accident, she has been now using that).  14.  Zyprexa 7.5 mg daily as a mood stabilizer.  15.  Ranitidine 150 mg twice a day.  16.  Imitrex as needed for migraine headaches.    REVIEW OF SYSTEMS:  Actually, she does not report pain in the chest or   palpitations.  No shortness of breath.  No abdominal pain.  She has bowel   function every day or every other day.  No difficulty urinating.  No nocturia.    Occasionally she will still have heartburn and ingestion, but the medicines have   kept them in check.  She will have migraine headaches that start off as  visual   disturbance followed by slight headache in the right temple then may progress to   a more severe headache in the parietooccipital.  She has chronic arthralgias   involving the joints, knees, hands, and feet.    PHYSICAL EXAMINATION:  VITAL SIGNS:  Weight is 202 pounds, pulse 92 and blood pressure 116/72.  HEENT:  Tympanic membranes are normal.  Nasal mucosa is clear.  Oropharynx, no   abnormal findings.  NECK:  No thyromegaly.  LUNGS:  Clear breath sounds.  HEART:  Regular rate and rhythm.  ABDOMEN:  Active bowel sounds.  Soft and nontender.  No hepatosplenomegaly or   abdominal masses.  EXTREMITIES:  2+ carotid pulses, 1+ pedal pulses.  Trace pedal pretibial edema.    The patient is definitely tender over the left posterior thoracic and lateral   thoracic chest wall.    IMPRESSION:  1. General exam.  2. Hypertension.  3. Hyperlipidemia.  4. Gastroesophageal reflux disease.  5. Left rib fractures.  6. Thoracic spine fractures.  7. Osteoarthritis.  8. Fibromyalgia.  9. Depression and anxiety, bipolar disorder.  10. Migraine headache disorder.  11. Chronic iron deficiency anemia.    PLAN:  Toradol injection 60 mg.  X-ray of thoracic spine, left ribs.    Comprehensive set of labs including iron studies, B12, folate, vitamin D, and   magnesium.  Refill on oxycodone for another week.  Hopefully, in time this will   get better.  Disposition to follow, particularly in that regard to follow up on   a bone density and mammogram.  She is also to let me know the exact date of her   last colonoscopy.        JAM/HN  dd: 10/17/2019 14:51:47 (CDT)  td: 10/18/2019 04:15:29 (CDT)  Doc ID   #[id number]  Job ID #[id number]    CC:

## 2019-10-17 ENCOUNTER — HOSPITAL ENCOUNTER (OUTPATIENT)
Dept: RADIOLOGY | Facility: HOSPITAL | Age: 64
Discharge: HOME | End: 2019-10-17
Attending: INTERNAL MEDICINE
Payer: MEDICARE

## 2019-10-17 ENCOUNTER — LAB VISIT (OUTPATIENT)
Dept: LAB | Facility: HOSPITAL | Age: 64
End: 2019-10-17
Attending: INTERNAL MEDICINE
Payer: MEDICARE

## 2019-10-17 ENCOUNTER — OFFICE VISIT (OUTPATIENT)
Dept: INTERNAL MEDICINE | Facility: CLINIC | Age: 64
End: 2019-10-17
Payer: MEDICARE

## 2019-10-17 VITALS
HEART RATE: 90 BPM | HEIGHT: 67 IN | OXYGEN SATURATION: 98 % | WEIGHT: 202 LBS | DIASTOLIC BLOOD PRESSURE: 72 MMHG | SYSTOLIC BLOOD PRESSURE: 116 MMHG | BODY MASS INDEX: 31.71 KG/M2

## 2019-10-17 DIAGNOSIS — S22.009S FRACTURE OF THORACIC SPINE, UNSPECIFIED FRACTURE MORPHOLOGY, SEQUELA: ICD-10-CM

## 2019-10-17 DIAGNOSIS — M79.7 FIBROMYALGIA: ICD-10-CM

## 2019-10-17 DIAGNOSIS — Z00.00 ANNUAL PHYSICAL EXAM: Primary | ICD-10-CM

## 2019-10-17 DIAGNOSIS — Z79.899 OTHER LONG TERM (CURRENT) DRUG THERAPY: ICD-10-CM

## 2019-10-17 DIAGNOSIS — S22.42XA CLOSED FRACTURE OF MULTIPLE RIBS OF LEFT SIDE, INITIAL ENCOUNTER: ICD-10-CM

## 2019-10-17 DIAGNOSIS — G43.909 MIGRAINE WITHOUT STATUS MIGRAINOSUS, NOT INTRACTABLE, UNSPECIFIED MIGRAINE TYPE: ICD-10-CM

## 2019-10-17 DIAGNOSIS — N39.46 MIXED STRESS AND URGE URINARY INCONTINENCE: ICD-10-CM

## 2019-10-17 DIAGNOSIS — F31.9 BIPOLAR AFFECTIVE DISORDER, REMISSION STATUS UNSPECIFIED: ICD-10-CM

## 2019-10-17 DIAGNOSIS — E78.5 HYPERLIPIDEMIA, UNSPECIFIED HYPERLIPIDEMIA TYPE: ICD-10-CM

## 2019-10-17 DIAGNOSIS — F32.A DEPRESSION, UNSPECIFIED DEPRESSION TYPE: ICD-10-CM

## 2019-10-17 DIAGNOSIS — D50.9 IRON DEFICIENCY ANEMIA, UNSPECIFIED IRON DEFICIENCY ANEMIA TYPE: ICD-10-CM

## 2019-10-17 DIAGNOSIS — K21.00 GASTROESOPHAGEAL REFLUX DISEASE WITH ESOPHAGITIS: ICD-10-CM

## 2019-10-17 DIAGNOSIS — Z00.00 ANNUAL PHYSICAL EXAM: ICD-10-CM

## 2019-10-17 DIAGNOSIS — I10 ESSENTIAL HYPERTENSION: ICD-10-CM

## 2019-10-17 LAB
25(OH)D3+25(OH)D2 SERPL-MCNC: 33 NG/ML (ref 30–96)
ALBUMIN SERPL BCP-MCNC: 3.8 G/DL (ref 3.5–5.2)
ALP SERPL-CCNC: 159 U/L (ref 55–135)
ALT SERPL W/O P-5'-P-CCNC: 14 U/L (ref 10–44)
ANION GAP SERPL CALC-SCNC: 13 MMOL/L (ref 8–16)
AST SERPL-CCNC: 22 U/L (ref 10–40)
BASOPHILS # BLD AUTO: 0.03 K/UL (ref 0–0.2)
BASOPHILS NFR BLD: 0.6 % (ref 0–1.9)
BILIRUB SERPL-MCNC: 0.3 MG/DL (ref 0.1–1)
BUN SERPL-MCNC: 23 MG/DL (ref 8–23)
CALCIUM SERPL-MCNC: 9.6 MG/DL (ref 8.7–10.5)
CHLORIDE SERPL-SCNC: 101 MMOL/L (ref 95–110)
CHOLEST SERPL-MCNC: 198 MG/DL (ref 120–199)
CHOLEST/HDLC SERPL: 3.2 {RATIO} (ref 2–5)
CO2 SERPL-SCNC: 30 MMOL/L (ref 23–29)
CREAT SERPL-MCNC: 1.7 MG/DL (ref 0.5–1.4)
DIFFERENTIAL METHOD: ABNORMAL
EOSINOPHIL # BLD AUTO: 0.3 K/UL (ref 0–0.5)
EOSINOPHIL NFR BLD: 6.5 % (ref 0–8)
ERYTHROCYTE [DISTWIDTH] IN BLOOD BY AUTOMATED COUNT: 14.6 % (ref 11.5–14.5)
EST. GFR  (AFRICAN AMERICAN): 36 ML/MIN/1.73 M^2
EST. GFR  (NON AFRICAN AMERICAN): 31.2 ML/MIN/1.73 M^2
FERRITIN SERPL-MCNC: 438 NG/ML (ref 20–300)
FOLATE SERPL-MCNC: 18.9 NG/ML (ref 4–24)
GLUCOSE SERPL-MCNC: 105 MG/DL (ref 70–110)
HCT VFR BLD AUTO: 30.6 % (ref 37–48.5)
HDLC SERPL-MCNC: 61 MG/DL (ref 40–75)
HDLC SERPL: 30.8 % (ref 20–50)
HGB BLD-MCNC: 9.3 G/DL (ref 12–16)
IMM GRANULOCYTES # BLD AUTO: 0.02 K/UL (ref 0–0.04)
IMM GRANULOCYTES NFR BLD AUTO: 0.4 % (ref 0–0.5)
IRON SERPL-MCNC: 70 UG/DL (ref 30–160)
LDLC SERPL CALC-MCNC: 93.6 MG/DL (ref 63–159)
LYMPHOCYTES # BLD AUTO: 1 K/UL (ref 1–4.8)
LYMPHOCYTES NFR BLD: 19.4 % (ref 18–48)
MAGNESIUM SERPL-MCNC: 2.1 MG/DL (ref 1.6–2.6)
MCH RBC QN AUTO: 29.8 PG (ref 27–31)
MCHC RBC AUTO-ENTMCNC: 30.4 G/DL (ref 32–36)
MCV RBC AUTO: 98 FL (ref 82–98)
MONOCYTES # BLD AUTO: 0.3 K/UL (ref 0.3–1)
MONOCYTES NFR BLD: 6.1 % (ref 4–15)
NEUTROPHILS # BLD AUTO: 3.5 K/UL (ref 1.8–7.7)
NEUTROPHILS NFR BLD: 67 % (ref 38–73)
NONHDLC SERPL-MCNC: 137 MG/DL
NRBC BLD-RTO: 0 /100 WBC
PLATELET # BLD AUTO: 258 K/UL (ref 150–350)
PMV BLD AUTO: 10.5 FL (ref 9.2–12.9)
POTASSIUM SERPL-SCNC: 4 MMOL/L (ref 3.5–5.1)
PROT SERPL-MCNC: 7.1 G/DL (ref 6–8.4)
RBC # BLD AUTO: 3.12 M/UL (ref 4–5.4)
SATURATED IRON: 23 % (ref 20–50)
SODIUM SERPL-SCNC: 144 MMOL/L (ref 136–145)
T4 FREE SERPL-MCNC: 1.02 NG/DL (ref 0.71–1.51)
TOTAL IRON BINDING CAPACITY: 303 UG/DL (ref 250–450)
TRANSFERRIN SERPL-MCNC: 205 MG/DL (ref 200–375)
TRIGL SERPL-MCNC: 217 MG/DL (ref 30–150)
TSH SERPL DL<=0.005 MIU/L-ACNC: 7.4 UIU/ML (ref 0.4–4)
VIT B12 SERPL-MCNC: 500 PG/ML (ref 210–950)
WBC # BLD AUTO: 5.27 K/UL (ref 3.9–12.7)

## 2019-10-17 PROCEDURE — 84439 ASSAY OF FREE THYROXINE: CPT | Mod: HCNC

## 2019-10-17 PROCEDURE — 72070 X-RAY EXAM THORAC SPINE 2VWS: CPT | Mod: 26,HCNC,, | Performed by: RADIOLOGY

## 2019-10-17 PROCEDURE — 71100 X-RAY EXAM RIBS UNI 2 VIEWS: CPT | Mod: 26,HCNC,LT, | Performed by: RADIOLOGY

## 2019-10-17 PROCEDURE — 3078F DIAST BP <80 MM HG: CPT | Mod: CPTII,S$GLB,, | Performed by: INTERNAL MEDICINE

## 2019-10-17 PROCEDURE — 72070 XR THORACIC SPINE AP LATERAL: ICD-10-PCS | Mod: 26,HCNC,, | Performed by: RADIOLOGY

## 2019-10-17 PROCEDURE — 82746 ASSAY OF FOLIC ACID SERUM: CPT | Mod: HCNC

## 2019-10-17 PROCEDURE — 71100 XR RIBS 2 VIEW LEFT: ICD-10-PCS | Mod: 26,HCNC,LT, | Performed by: RADIOLOGY

## 2019-10-17 PROCEDURE — 82306 VITAMIN D 25 HYDROXY: CPT | Mod: HCNC

## 2019-10-17 PROCEDURE — 80061 LIPID PANEL: CPT | Mod: HCNC

## 2019-10-17 PROCEDURE — 3074F SYST BP LT 130 MM HG: CPT | Mod: CPTII,S$GLB,, | Performed by: INTERNAL MEDICINE

## 2019-10-17 PROCEDURE — 72070 X-RAY EXAM THORAC SPINE 2VWS: CPT | Mod: TC,HCNC

## 2019-10-17 PROCEDURE — 36415 COLL VENOUS BLD VENIPUNCTURE: CPT | Mod: HCNC

## 2019-10-17 PROCEDURE — 99397 PER PM REEVAL EST PAT 65+ YR: CPT | Mod: 25,S$GLB,, | Performed by: INTERNAL MEDICINE

## 2019-10-17 PROCEDURE — 71100 X-RAY EXAM RIBS UNI 2 VIEWS: CPT | Mod: TC,HCNC,LT

## 2019-10-17 PROCEDURE — 3074F PR MOST RECENT SYSTOLIC BLOOD PRESSURE < 130 MM HG: ICD-10-PCS | Mod: CPTII,S$GLB,, | Performed by: INTERNAL MEDICINE

## 2019-10-17 PROCEDURE — 83735 ASSAY OF MAGNESIUM: CPT | Mod: HCNC

## 2019-10-17 PROCEDURE — 82728 ASSAY OF FERRITIN: CPT | Mod: HCNC

## 2019-10-17 PROCEDURE — 99999 PR PBB SHADOW E&M-EST. PATIENT-LVL V: ICD-10-PCS | Mod: PBBFAC,,, | Performed by: INTERNAL MEDICINE

## 2019-10-17 PROCEDURE — 96372 THER/PROPH/DIAG INJ SC/IM: CPT | Mod: S$GLB,,, | Performed by: INTERNAL MEDICINE

## 2019-10-17 PROCEDURE — 99999 PR PBB SHADOW E&M-EST. PATIENT-LVL V: CPT | Mod: PBBFAC,,, | Performed by: INTERNAL MEDICINE

## 2019-10-17 PROCEDURE — 83540 ASSAY OF IRON: CPT | Mod: HCNC

## 2019-10-17 PROCEDURE — 3078F PR MOST RECENT DIASTOLIC BLOOD PRESSURE < 80 MM HG: ICD-10-PCS | Mod: CPTII,S$GLB,, | Performed by: INTERNAL MEDICINE

## 2019-10-17 PROCEDURE — 99397 PR PREVENTIVE VISIT,EST,65 & OVER: ICD-10-PCS | Mod: 25,S$GLB,, | Performed by: INTERNAL MEDICINE

## 2019-10-17 PROCEDURE — 85025 COMPLETE CBC W/AUTO DIFF WBC: CPT | Mod: HCNC

## 2019-10-17 PROCEDURE — 80053 COMPREHEN METABOLIC PANEL: CPT | Mod: HCNC

## 2019-10-17 PROCEDURE — 96372 PR INJECTION,THERAP/PROPH/DIAG2ST, IM OR SUBCUT: ICD-10-PCS | Mod: S$GLB,,, | Performed by: INTERNAL MEDICINE

## 2019-10-17 PROCEDURE — 84443 ASSAY THYROID STIM HORMONE: CPT | Mod: HCNC

## 2019-10-17 PROCEDURE — 82607 VITAMIN B-12: CPT | Mod: HCNC

## 2019-10-17 RX ORDER — FLUOXETINE HYDROCHLORIDE 20 MG/1
20 CAPSULE ORAL DAILY
Status: ON HOLD | COMMUNITY
Start: 2019-09-18 | End: 2023-09-25 | Stop reason: HOSPADM

## 2019-10-17 RX ORDER — OXYCODONE AND ACETAMINOPHEN 5; 325 MG/1; MG/1
1 TABLET ORAL EVERY 6 HOURS PRN
Qty: 30 TABLET | Refills: 0 | Status: SHIPPED | OUTPATIENT
Start: 2019-10-17 | End: 2021-02-25

## 2019-10-17 RX ORDER — KETOROLAC TROMETHAMINE 30 MG/ML
60 INJECTION, SOLUTION INTRAMUSCULAR; INTRAVENOUS
Status: COMPLETED | OUTPATIENT
Start: 2019-10-17 | End: 2019-10-17

## 2019-10-17 RX ORDER — OLANZAPINE 7.5 MG/1
TABLET ORAL
COMMUNITY
Start: 2019-09-18 | End: 2021-02-25

## 2019-10-17 RX ORDER — PREGABALIN 75 MG/1
75 CAPSULE ORAL 3 TIMES DAILY
COMMUNITY
Start: 2019-10-17 | End: 2020-10-16

## 2019-10-17 RX ORDER — ACETAMINOPHEN 500 MG
1000 TABLET ORAL 2 TIMES DAILY PRN
COMMUNITY
Start: 2018-02-22

## 2019-10-17 RX ORDER — OLANZAPINE 5 MG/1
TABLET ORAL
COMMUNITY
End: 2021-02-25

## 2019-10-17 RX ADMIN — KETOROLAC TROMETHAMINE 60 MG: 30 INJECTION, SOLUTION INTRAMUSCULAR; INTRAVENOUS at 03:10

## 2019-11-05 ENCOUNTER — PATIENT MESSAGE (OUTPATIENT)
Dept: INTERNAL MEDICINE | Facility: CLINIC | Age: 64
End: 2019-11-05

## 2019-11-05 DIAGNOSIS — Z12.31 ENCOUNTER FOR SCREENING MAMMOGRAM FOR BREAST CANCER: ICD-10-CM

## 2019-11-05 DIAGNOSIS — D64.9 ANEMIA, UNSPECIFIED TYPE: ICD-10-CM

## 2019-11-05 DIAGNOSIS — Z78.0 ASYMPTOMATIC MENOPAUSAL STATE: Primary | ICD-10-CM

## 2019-11-05 DIAGNOSIS — S22.42XA CLOSED FRACTURE OF MULTIPLE RIBS OF LEFT SIDE, INITIAL ENCOUNTER: ICD-10-CM

## 2019-11-05 DIAGNOSIS — N18.30 STAGE 3 CHRONIC KIDNEY DISEASE: ICD-10-CM

## 2019-11-07 NOTE — PROGRESS NOTES
Review note  The labs and radiographs reviewed    Fractures involving the left 5th and 6th rib and compression deformity at T8 noted  the compression deformity appears to be chronic    Regarding labs the hemoglobin is 9.3 with MCV 98 but this is improved compared to a year ago     B12 folate ferritin iron levels were fine    Most noted finding was a bump in the creatinine 1.7   advised the put a hold on her naproxen improve her fluid intake      She will be scheduledfor mammogram and bone density and on the same time repeat CBC and chemistry tests    She will also follow up with a hematologist regarding her chronic anemia

## 2019-11-07 NOTE — TELEPHONE ENCOUNTER
Bone density and mammogram orders are in    Also on the same dates, set up lab orders of CBC, reticulocyte count, BMP

## 2019-11-08 ENCOUNTER — PATIENT MESSAGE (OUTPATIENT)
Dept: RADIOLOGY | Facility: HOSPITAL | Age: 64
End: 2019-11-08

## 2020-02-07 ENCOUNTER — PATIENT MESSAGE (OUTPATIENT)
Dept: INTERNAL MEDICINE | Facility: CLINIC | Age: 65
End: 2020-02-07

## 2020-02-07 RX ORDER — ATORVASTATIN CALCIUM 20 MG/1
20 TABLET, FILM COATED ORAL DAILY
Qty: 90 TABLET | Refills: 1 | Status: SHIPPED | OUTPATIENT
Start: 2020-02-07 | End: 2020-05-18

## 2020-02-18 RX ORDER — FOLIC ACID 1 MG/1
TABLET ORAL
Qty: 30 TABLET | Refills: 4 | Status: SHIPPED | OUTPATIENT
Start: 2020-02-18 | End: 2020-07-17

## 2020-03-17 ENCOUNTER — HOSPITAL ENCOUNTER (OUTPATIENT)
Dept: RADIOLOGY | Facility: HOSPITAL | Age: 65
Discharge: HOME | End: 2020-03-17
Attending: INTERNAL MEDICINE
Payer: MEDICARE

## 2020-03-17 ENCOUNTER — OFFICE VISIT (OUTPATIENT)
Dept: INTERNAL MEDICINE | Facility: CLINIC | Age: 65
End: 2020-03-17
Payer: MEDICARE

## 2020-03-17 VITALS
DIASTOLIC BLOOD PRESSURE: 72 MMHG | TEMPERATURE: 98 F | OXYGEN SATURATION: 98 % | HEART RATE: 80 BPM | HEIGHT: 67 IN | BODY MASS INDEX: 29.51 KG/M2 | SYSTOLIC BLOOD PRESSURE: 120 MMHG | WEIGHT: 188 LBS

## 2020-03-17 DIAGNOSIS — Z79.899 OTHER LONG TERM (CURRENT) DRUG THERAPY: ICD-10-CM

## 2020-03-17 DIAGNOSIS — S22.070A COMPRESSION FRACTURE OF T10 VERTEBRA, INITIAL ENCOUNTER: ICD-10-CM

## 2020-03-17 DIAGNOSIS — F32.A DEPRESSION, UNSPECIFIED DEPRESSION TYPE: ICD-10-CM

## 2020-03-17 DIAGNOSIS — I10 ESSENTIAL HYPERTENSION: ICD-10-CM

## 2020-03-17 DIAGNOSIS — K21.00 GASTROESOPHAGEAL REFLUX DISEASE WITH ESOPHAGITIS: ICD-10-CM

## 2020-03-17 DIAGNOSIS — M79.672 LEFT FOOT PAIN: ICD-10-CM

## 2020-03-17 DIAGNOSIS — M54.6 ACUTE RIGHT-SIDED THORACIC BACK PAIN: Primary | ICD-10-CM

## 2020-03-17 DIAGNOSIS — M79.7 FIBROMYALGIA: ICD-10-CM

## 2020-03-17 DIAGNOSIS — M81.0 OSTEOPOROSIS, UNSPECIFIED OSTEOPOROSIS TYPE, UNSPECIFIED PATHOLOGICAL FRACTURE PRESENCE: ICD-10-CM

## 2020-03-17 DIAGNOSIS — D50.9 IRON DEFICIENCY ANEMIA, UNSPECIFIED IRON DEFICIENCY ANEMIA TYPE: ICD-10-CM

## 2020-03-17 DIAGNOSIS — E78.5 HYPERLIPIDEMIA, UNSPECIFIED HYPERLIPIDEMIA TYPE: ICD-10-CM

## 2020-03-17 DIAGNOSIS — G43.909 MIGRAINE WITHOUT STATUS MIGRAINOSUS, NOT INTRACTABLE, UNSPECIFIED MIGRAINE TYPE: ICD-10-CM

## 2020-03-17 PROCEDURE — 99214 OFFICE O/P EST MOD 30 MIN: CPT | Mod: HCNC,S$GLB,, | Performed by: INTERNAL MEDICINE

## 2020-03-17 PROCEDURE — 72128 CT CHEST SPINE W/O DYE: CPT | Mod: 26,HCNC,, | Performed by: RADIOLOGY

## 2020-03-17 PROCEDURE — 73630 X-RAY EXAM OF FOOT: CPT | Mod: 26,HCNC,LT, | Performed by: RADIOLOGY

## 2020-03-17 PROCEDURE — 3074F PR MOST RECENT SYSTOLIC BLOOD PRESSURE < 130 MM HG: ICD-10-PCS | Mod: HCNC,CPTII,S$GLB, | Performed by: INTERNAL MEDICINE

## 2020-03-17 PROCEDURE — 1159F MED LIST DOCD IN RCRD: CPT | Mod: HCNC,S$GLB,, | Performed by: INTERNAL MEDICINE

## 2020-03-17 PROCEDURE — 73630 X-RAY EXAM OF FOOT: CPT | Mod: TC,HCNC,LT

## 2020-03-17 PROCEDURE — 1101F PT FALLS ASSESS-DOCD LE1/YR: CPT | Mod: HCNC,CPTII,S$GLB, | Performed by: INTERNAL MEDICINE

## 2020-03-17 PROCEDURE — 99214 PR OFFICE/OUTPT VISIT, EST, LEVL IV, 30-39 MIN: ICD-10-PCS | Mod: HCNC,S$GLB,, | Performed by: INTERNAL MEDICINE

## 2020-03-17 PROCEDURE — 99999 PR PBB SHADOW E&M-EST. PATIENT-LVL V: ICD-10-PCS | Mod: PBBFAC,HCNC,, | Performed by: INTERNAL MEDICINE

## 2020-03-17 PROCEDURE — 3078F DIAST BP <80 MM HG: CPT | Mod: HCNC,CPTII,S$GLB, | Performed by: INTERNAL MEDICINE

## 2020-03-17 PROCEDURE — 1101F PR PT FALLS ASSESS DOC 0-1 FALLS W/OUT INJ PAST YR: ICD-10-PCS | Mod: HCNC,CPTII,S$GLB, | Performed by: INTERNAL MEDICINE

## 2020-03-17 PROCEDURE — 3078F PR MOST RECENT DIASTOLIC BLOOD PRESSURE < 80 MM HG: ICD-10-PCS | Mod: HCNC,CPTII,S$GLB, | Performed by: INTERNAL MEDICINE

## 2020-03-17 PROCEDURE — 72128 CT THORACIC SPINE WITHOUT CONTRAST: ICD-10-PCS | Mod: 26,HCNC,, | Performed by: RADIOLOGY

## 2020-03-17 PROCEDURE — 72128 CT CHEST SPINE W/O DYE: CPT | Mod: TC,HCNC

## 2020-03-17 PROCEDURE — 99999 PR PBB SHADOW E&M-EST. PATIENT-LVL V: CPT | Mod: PBBFAC,HCNC,, | Performed by: INTERNAL MEDICINE

## 2020-03-17 PROCEDURE — 73630 XR FOOT COMPLETE 3 VIEW LEFT: ICD-10-PCS | Mod: 26,HCNC,LT, | Performed by: RADIOLOGY

## 2020-03-17 PROCEDURE — 3074F SYST BP LT 130 MM HG: CPT | Mod: HCNC,CPTII,S$GLB, | Performed by: INTERNAL MEDICINE

## 2020-03-17 PROCEDURE — 1159F PR MEDICATION LIST DOCUMENTED IN MEDICAL RECORD: ICD-10-PCS | Mod: HCNC,S$GLB,, | Performed by: INTERNAL MEDICINE

## 2020-03-17 RX ORDER — PREGABALIN 100 MG/1
100 CAPSULE ORAL 3 TIMES DAILY
COMMUNITY
End: 2021-02-25

## 2020-03-17 NOTE — PROGRESS NOTES
PAST MEDICAL HISTORY:  Hypertension.  Hyperlipidemia.  Gastroesophageal reflux disease with history of dilatation.  Depression, anxiety.  Bipolar disorder  Fibromyalgia.  Restless legs syndrome.  Migraine headache disorder.  Herpes zoster infection.  Chronic iron deficiency anemia     PAST SURGICAL HISTORY:  Complete hysterectomy.  Bilateral Carmichael neuroma neurectomies with hammertoe repair.  Tonsillectomy.  .  Right elbow surgery due to epicondylitis.  LASIK surgery in both eyes.  Osteotomies involving the mandibulomaxillary region of the face with chin   augmentation.  Right femur fracture with intramedullary neil placement.  Posttraumatic fractures involving her right ankle, left elbow and clavicle.    MEDICATIONS:  1. Acetaminophen 500 mg two tablets twice a day as needed.  2. Atorvastatin 20 mg daily.  3. Cyclobenzaprine 10 mg three times a day as needed.  4. [id number] mg at night.  5. Nexium 40 mg daily.  6. Prozac 20 mg daily.  7. Folic acid 1 mg daily.  9. Lamictal 200 mg twice a day.  10. Ativan 0.5 mg one pill a day as needed.  15.  Ranitidine 150 mg twice a day.  16.  Imitrex as needed for migraine headaches.          65-year-old female    Was in the hospital he is just sitting   Had a fall  outside [place] alone  Her left ft inverted and she fell on the right side  Hit her right side of her head ribs lower back  Developed a bruise over the right flank  Had serial CTs  The 2nd CT showed the possibility of a subdural hematoma but the subsequent another CTs were negative and was told of artifact  Had CTs of the chest abdomen and pelvis  The only thing noted was a compression fraction T10  She was told that renal function labs were abnormal  Her losartan medication was put on hold      Pilar complaint is still having ongoing pain in the right flank region  Was having headaches but this has resolved    The other situation is that from  on she has been having a dry  nonproductive  Cough it is getting better  No shortness of breath chest pain or abdominal pain  Has indigestion on a regular basis  No nasal head congestion      Regarding her medications outlined above    Previous medicines that she was on but not taking now is gabapentin, Zyprexa, naproxen, Imitrex    New medication is Lyrica 100 mg twice a day  The use of cyclobenzaprine is rare as well as Ativan  She has oxycodone 5-325 use as needed for pain      Examination  Weight 188 lb  Pulse 92  Blood pressure 130/72  Neck no thyromegaly no masses  Lungs clear breath sounds  Heart regular rate and rhythm no murmurs  Abdominal exam active bowel sounds nontender soft no hepatosplenomegaly abdominal masses  Ecchymosis over the right flank and tender  Tender over the mid to lower thoracic vertebrae  Tender over the dorsal aspect of the left foot  2+ carotid pulses 2+ pedal pulses  Thoracic kyphosis and increased lumbar lordosis0    Impression  Soft tissue injury right thoracic  Thoracic T10 compression fracture  Traumatic right foot pain  Osteoporosis  Hypertension   Fibromyalgia  Depression anxiety  Migraine headache disorder  GERD  Recent allergic cough    Plan  Chemistry CBC lipid vitamin-D magnesium TSH B12  X-ray left foot  CT thoracic spine  Bone mineral density scan

## 2020-03-23 ENCOUNTER — PATIENT MESSAGE (OUTPATIENT)
Dept: INTERNAL MEDICINE | Facility: CLINIC | Age: 65
End: 2020-03-23

## 2020-04-10 ENCOUNTER — PATIENT MESSAGE (OUTPATIENT)
Dept: INTERNAL MEDICINE | Facility: CLINIC | Age: 65
End: 2020-04-10

## 2020-04-10 RX ORDER — METOPROLOL SUCCINATE 50 MG/1
50 TABLET, EXTENDED RELEASE ORAL DAILY
Qty: 30 TABLET | Refills: 3 | Status: SHIPPED | OUTPATIENT
Start: 2020-04-10 | End: 2020-04-16 | Stop reason: SDUPTHER

## 2020-05-18 RX ORDER — ATORVASTATIN CALCIUM 20 MG/1
TABLET, FILM COATED ORAL
Qty: 90 TABLET | Refills: 1 | Status: SHIPPED | OUTPATIENT
Start: 2020-05-18 | End: 2020-11-09

## 2020-05-18 RX ORDER — METOPROLOL SUCCINATE 50 MG/1
50 TABLET, EXTENDED RELEASE ORAL DAILY
Qty: 90 TABLET | Refills: 0 | Status: SHIPPED | OUTPATIENT
Start: 2020-05-18 | End: 2020-08-16

## 2020-09-25 ENCOUNTER — PES CALL (OUTPATIENT)
Dept: ADMINISTRATIVE | Facility: CLINIC | Age: 65
End: 2020-09-25

## 2021-02-02 ENCOUNTER — PATIENT MESSAGE (OUTPATIENT)
Dept: INTERNAL MEDICINE | Facility: CLINIC | Age: 66
End: 2021-02-02

## 2021-02-11 ENCOUNTER — TELEPHONE (OUTPATIENT)
Dept: OTOLARYNGOLOGY | Facility: CLINIC | Age: 66
End: 2021-02-11

## 2021-02-15 RX ORDER — METOPROLOL SUCCINATE 50 MG/1
50 TABLET, EXTENDED RELEASE ORAL DAILY
Qty: 90 TABLET | Refills: 1 | Status: CANCELLED | OUTPATIENT
Start: 2021-02-15

## 2021-02-16 RX ORDER — METOPROLOL SUCCINATE 50 MG/1
TABLET, EXTENDED RELEASE ORAL
Qty: 90 TABLET | Refills: 0 | Status: SHIPPED | OUTPATIENT
Start: 2021-02-16 | End: 2021-02-26

## 2021-02-18 ENCOUNTER — PATIENT MESSAGE (OUTPATIENT)
Dept: INTERNAL MEDICINE | Facility: CLINIC | Age: 66
End: 2021-02-18

## 2021-02-25 ENCOUNTER — PATIENT MESSAGE (OUTPATIENT)
Dept: INTERNAL MEDICINE | Facility: CLINIC | Age: 66
End: 2021-02-25

## 2021-02-25 ENCOUNTER — OFFICE VISIT (OUTPATIENT)
Dept: OTOLARYNGOLOGY | Facility: CLINIC | Age: 66
End: 2021-02-25
Payer: MEDICARE

## 2021-02-25 VITALS
BODY MASS INDEX: 26.69 KG/M2 | TEMPERATURE: 99 F | WEIGHT: 170.06 LBS | HEIGHT: 67 IN | DIASTOLIC BLOOD PRESSURE: 79 MMHG | SYSTOLIC BLOOD PRESSURE: 170 MMHG | HEART RATE: 78 BPM

## 2021-02-25 DIAGNOSIS — J34.2 NASAL SEPTAL DEVIATION: ICD-10-CM

## 2021-02-25 DIAGNOSIS — H60.8X1 CHRONIC ECZEMATOUS OTITIS EXTERNA OF RIGHT EAR: ICD-10-CM

## 2021-02-25 DIAGNOSIS — H61.20 IMPACTED CERUMEN, UNSPECIFIED LATERALITY: ICD-10-CM

## 2021-02-25 DIAGNOSIS — H93.19 TINNITUS, UNSPECIFIED LATERALITY: ICD-10-CM

## 2021-02-25 DIAGNOSIS — R55 DROP ATTACK: ICD-10-CM

## 2021-02-25 DIAGNOSIS — H93.299 ABNORMAL AUDITORY PERCEPTION, UNSPECIFIED LATERALITY: Primary | ICD-10-CM

## 2021-02-25 PROCEDURE — 99999 PR PBB SHADOW E&M-EST. PATIENT-LVL IV: CPT | Mod: PBBFAC,,, | Performed by: SPECIALIST

## 2021-02-25 PROCEDURE — 3288F FALL RISK ASSESSMENT DOCD: CPT | Mod: CPTII,S$GLB,, | Performed by: SPECIALIST

## 2021-02-25 PROCEDURE — 1159F PR MEDICATION LIST DOCUMENTED IN MEDICAL RECORD: ICD-10-PCS | Mod: S$GLB,,, | Performed by: SPECIALIST

## 2021-02-25 PROCEDURE — 1159F MED LIST DOCD IN RCRD: CPT | Mod: S$GLB,,, | Performed by: SPECIALIST

## 2021-02-25 PROCEDURE — 3008F BODY MASS INDEX DOCD: CPT | Mod: CPTII,S$GLB,, | Performed by: SPECIALIST

## 2021-02-25 PROCEDURE — 3078F PR MOST RECENT DIASTOLIC BLOOD PRESSURE < 80 MM HG: ICD-10-PCS | Mod: CPTII,S$GLB,, | Performed by: SPECIALIST

## 2021-02-25 PROCEDURE — 99999 PR PBB SHADOW E&M-EST. PATIENT-LVL IV: ICD-10-PCS | Mod: PBBFAC,,, | Performed by: SPECIALIST

## 2021-02-25 PROCEDURE — 3077F SYST BP >= 140 MM HG: CPT | Mod: CPTII,S$GLB,, | Performed by: SPECIALIST

## 2021-02-25 PROCEDURE — 99204 OFFICE O/P NEW MOD 45 MIN: CPT | Mod: S$GLB,,, | Performed by: SPECIALIST

## 2021-02-25 PROCEDURE — 1126F PR PAIN SEVERITY QUANTIFIED, NO PAIN PRESENT: ICD-10-PCS | Mod: S$GLB,,, | Performed by: SPECIALIST

## 2021-02-25 PROCEDURE — 1126F AMNT PAIN NOTED NONE PRSNT: CPT | Mod: S$GLB,,, | Performed by: SPECIALIST

## 2021-02-25 PROCEDURE — 3288F PR FALLS RISK ASSESSMENT DOCUMENTED: ICD-10-PCS | Mod: CPTII,S$GLB,, | Performed by: SPECIALIST

## 2021-02-25 PROCEDURE — 1100F PR PT FALLS ASSESS DOC 2+ FALLS/FALL W/INJURY/YR: ICD-10-PCS | Mod: CPTII,S$GLB,, | Performed by: SPECIALIST

## 2021-02-25 PROCEDURE — 99204 PR OFFICE/OUTPT VISIT, NEW, LEVL IV, 45-59 MIN: ICD-10-PCS | Mod: S$GLB,,, | Performed by: SPECIALIST

## 2021-02-25 PROCEDURE — 3078F DIAST BP <80 MM HG: CPT | Mod: CPTII,S$GLB,, | Performed by: SPECIALIST

## 2021-02-25 PROCEDURE — 1100F PTFALLS ASSESS-DOCD GE2>/YR: CPT | Mod: CPTII,S$GLB,, | Performed by: SPECIALIST

## 2021-02-25 PROCEDURE — 3077F PR MOST RECENT SYSTOLIC BLOOD PRESSURE >= 140 MM HG: ICD-10-PCS | Mod: CPTII,S$GLB,, | Performed by: SPECIALIST

## 2021-02-25 PROCEDURE — 3008F PR BODY MASS INDEX (BMI) DOCUMENTED: ICD-10-PCS | Mod: CPTII,S$GLB,, | Performed by: SPECIALIST

## 2021-02-25 RX ORDER — TRIAMCINOLONE ACETONIDE 1 MG/G
CREAM TOPICAL 2 TIMES DAILY
Qty: 15 G | Refills: 3 | Status: SHIPPED | OUTPATIENT
Start: 2021-02-25 | End: 2021-09-17 | Stop reason: CLARIF

## 2021-02-26 ENCOUNTER — TELEPHONE (OUTPATIENT)
Dept: OTOLARYNGOLOGY | Facility: CLINIC | Age: 66
End: 2021-02-26

## 2021-02-26 ENCOUNTER — PATIENT MESSAGE (OUTPATIENT)
Dept: INTERNAL MEDICINE | Facility: CLINIC | Age: 66
End: 2021-02-26

## 2021-02-26 RX ORDER — METOPROLOL SUCCINATE 100 MG/1
100 TABLET, EXTENDED RELEASE ORAL DAILY
Qty: 30 TABLET | Refills: 2 | Status: SHIPPED | OUTPATIENT
Start: 2021-02-26 | End: 2021-03-01 | Stop reason: SDUPTHER

## 2021-03-01 ENCOUNTER — PATIENT MESSAGE (OUTPATIENT)
Dept: AUDIOLOGY | Facility: CLINIC | Age: 66
End: 2021-03-01

## 2021-03-01 ENCOUNTER — PATIENT MESSAGE (OUTPATIENT)
Dept: OTOLARYNGOLOGY | Facility: CLINIC | Age: 66
End: 2021-03-01

## 2021-03-01 RX ORDER — METOPROLOL SUCCINATE 100 MG/1
100 TABLET, EXTENDED RELEASE ORAL DAILY
Qty: 90 TABLET | Refills: 1 | Status: SHIPPED | OUTPATIENT
Start: 2021-03-01 | End: 2021-10-10

## 2021-03-03 ENCOUNTER — PES CALL (OUTPATIENT)
Dept: ADMINISTRATIVE | Facility: CLINIC | Age: 66
End: 2021-03-03

## 2021-03-15 ENCOUNTER — CLINICAL SUPPORT (OUTPATIENT)
Dept: AUDIOLOGY | Facility: CLINIC | Age: 66
End: 2021-03-15
Payer: MEDICARE

## 2021-03-15 DIAGNOSIS — H81.8X1 RIGHT-SIDED VESTIBULAR WEAKNESS: ICD-10-CM

## 2021-03-15 DIAGNOSIS — R55 DROP ATTACK: ICD-10-CM

## 2021-03-15 DIAGNOSIS — H81.12 BENIGN PAROXYSMAL POSITIONAL VERTIGO, LEFT: Primary | ICD-10-CM

## 2021-03-15 DIAGNOSIS — H93.293 ABNORMAL AUDITORY PERCEPTION OF BOTH EARS: ICD-10-CM

## 2021-03-15 PROCEDURE — 92567 PR TYMPA2METRY: ICD-10-PCS | Mod: S$GLB,,, | Performed by: AUDIOLOGIST

## 2021-03-15 PROCEDURE — 92537 PR CALORIC VSTBLR TEST W/REC BITHERMAL: ICD-10-PCS | Mod: S$GLB,,, | Performed by: AUDIOLOGIST

## 2021-03-15 PROCEDURE — 92540 BASIC VESTIBULAR EVALUATION: CPT | Mod: S$GLB,,, | Performed by: AUDIOLOGIST

## 2021-03-15 PROCEDURE — 92537 CALORIC VSTBLR TEST W/REC: CPT | Mod: S$GLB,,, | Performed by: AUDIOLOGIST

## 2021-03-15 PROCEDURE — 92557 COMPREHENSIVE HEARING TEST: CPT | Mod: S$GLB,,, | Performed by: AUDIOLOGIST

## 2021-03-15 PROCEDURE — 99999 PR PBB SHADOW E&M-EST. PATIENT-LVL I: CPT | Mod: PBBFAC,,, | Performed by: AUDIOLOGIST

## 2021-03-15 PROCEDURE — 92567 TYMPANOMETRY: CPT | Mod: S$GLB,,, | Performed by: AUDIOLOGIST

## 2021-03-15 PROCEDURE — 92557 PR COMPREHENSIVE HEARING TEST: ICD-10-PCS | Mod: S$GLB,,, | Performed by: AUDIOLOGIST

## 2021-03-15 PROCEDURE — 92540 PR VESTIBULAR EVAL NYSTAG FOVL&PERPH STIM OSCIL TRACKING: ICD-10-PCS | Mod: S$GLB,,, | Performed by: AUDIOLOGIST

## 2021-03-15 PROCEDURE — 99999 PR PBB SHADOW E&M-EST. PATIENT-LVL I: ICD-10-PCS | Mod: PBBFAC,,, | Performed by: AUDIOLOGIST

## 2021-03-18 ENCOUNTER — TELEPHONE (OUTPATIENT)
Dept: OTOLARYNGOLOGY | Facility: CLINIC | Age: 66
End: 2021-03-18

## 2021-04-05 ENCOUNTER — PATIENT MESSAGE (OUTPATIENT)
Dept: ADMINISTRATIVE | Facility: HOSPITAL | Age: 66
End: 2021-04-05

## 2021-04-09 ENCOUNTER — PATIENT MESSAGE (OUTPATIENT)
Dept: OTOLARYNGOLOGY | Facility: CLINIC | Age: 66
End: 2021-04-09

## 2021-04-15 ENCOUNTER — PATIENT MESSAGE (OUTPATIENT)
Dept: RESEARCH | Facility: HOSPITAL | Age: 66
End: 2021-04-15

## 2021-04-26 ENCOUNTER — PATIENT OUTREACH (OUTPATIENT)
Dept: ADMINISTRATIVE | Facility: HOSPITAL | Age: 66
End: 2021-04-26

## 2021-04-26 ENCOUNTER — PATIENT MESSAGE (OUTPATIENT)
Dept: ADMINISTRATIVE | Facility: HOSPITAL | Age: 66
End: 2021-04-26

## 2021-04-26 DIAGNOSIS — Z12.31 ENCOUNTER FOR SCREENING MAMMOGRAM FOR BREAST CANCER: Primary | ICD-10-CM

## 2021-05-27 RX ORDER — ATORVASTATIN CALCIUM 20 MG/1
20 TABLET, FILM COATED ORAL DAILY
Qty: 90 TABLET | Refills: 1 | Status: CANCELLED | OUTPATIENT
Start: 2021-05-27

## 2021-05-28 RX ORDER — FOLIC ACID 1 MG/1
TABLET ORAL
Qty: 90 TABLET | Refills: 0 | Status: SHIPPED | OUTPATIENT
Start: 2021-05-28 | End: 2021-08-11

## 2021-05-28 RX ORDER — ATORVASTATIN CALCIUM 20 MG/1
TABLET, FILM COATED ORAL
Qty: 90 TABLET | Refills: 0 | Status: SHIPPED | OUTPATIENT
Start: 2021-05-28 | End: 2021-08-11

## 2021-06-11 ENCOUNTER — PATIENT OUTREACH (OUTPATIENT)
Dept: ADMINISTRATIVE | Facility: HOSPITAL | Age: 66
End: 2021-06-11

## 2021-06-22 ENCOUNTER — PATIENT OUTREACH (OUTPATIENT)
Dept: ADMINISTRATIVE | Facility: HOSPITAL | Age: 66
End: 2021-06-22

## 2021-06-22 ENCOUNTER — PATIENT MESSAGE (OUTPATIENT)
Dept: ADMINISTRATIVE | Facility: HOSPITAL | Age: 66
End: 2021-06-22

## 2021-06-22 DIAGNOSIS — E78.5 HYPERLIPIDEMIA, UNSPECIFIED HYPERLIPIDEMIA TYPE: Primary | ICD-10-CM

## 2021-07-06 ENCOUNTER — PATIENT MESSAGE (OUTPATIENT)
Dept: ADMINISTRATIVE | Facility: HOSPITAL | Age: 66
End: 2021-07-06

## 2021-09-04 PROCEDURE — 96372 THER/PROPH/DIAG INJ SC/IM: CPT | Mod: 59

## 2021-09-04 PROCEDURE — 99285 EMERGENCY DEPT VISIT HI MDM: CPT | Mod: 25

## 2021-09-05 ENCOUNTER — HOSPITAL ENCOUNTER (EMERGENCY)
Facility: HOSPITAL | Age: 66
Discharge: HOME | End: 2021-09-05
Attending: EMERGENCY MEDICINE
Payer: MEDICARE

## 2021-09-05 VITALS
TEMPERATURE: 98 F | HEART RATE: 56 BPM | OXYGEN SATURATION: 100 % | WEIGHT: 177.38 LBS | HEIGHT: 67 IN | RESPIRATION RATE: 18 BRPM | SYSTOLIC BLOOD PRESSURE: 176 MMHG | DIASTOLIC BLOOD PRESSURE: 73 MMHG | BODY MASS INDEX: 27.84 KG/M2

## 2021-09-05 DIAGNOSIS — S02.2XXA CLOSED FRACTURE OF NASAL BONE, INITIAL ENCOUNTER: Primary | ICD-10-CM

## 2021-09-05 DIAGNOSIS — S46.819A STRAIN OF TRAPEZIUS MUSCLE, UNSPECIFIED LATERALITY, INITIAL ENCOUNTER: ICD-10-CM

## 2021-09-05 DIAGNOSIS — S00.83XA CONTUSION OF FOREHEAD, INITIAL ENCOUNTER: ICD-10-CM
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
## 2021-09-05 PROCEDURE — 63600175 PHARM REV CODE 636 W HCPCS: Performed by: EMERGENCY MEDICINE

## 2021-09-05 RX ORDER — AMOXICILLIN AND CLAVULANATE POTASSIUM 875; 125 MG/1; MG/1
1 TABLET, FILM COATED ORAL 2 TIMES DAILY
Qty: 10 TABLET | Refills: 0 | Status: SHIPPED | OUTPATIENT
Start: 2021-09-05 | End: 2021-09-10

## 2021-09-05 RX ORDER — HYDROCODONE BITARTRATE AND ACETAMINOPHEN 10; 325 MG/1; MG/1
1 TABLET ORAL EVERY 4 HOURS PRN
Qty: 11 TABLET | Refills: 0 | Status: SHIPPED | OUTPATIENT
Start: 2021-09-05 | End: 2021-09-08

## 2021-09-05 RX ORDER — KETOROLAC TROMETHAMINE 30 MG/ML
30 INJECTION, SOLUTION INTRAMUSCULAR; INTRAVENOUS
Status: COMPLETED | OUTPATIENT
Start: 2021-09-05 | End: 2021-09-05

## 2021-09-05 RX ADMIN — KETOROLAC TROMETHAMINE 30 MG: 30 INJECTION, SOLUTION INTRAMUSCULAR; INTRAVENOUS at 03:09

## 2021-09-13 ENCOUNTER — TELEPHONE (OUTPATIENT)
Dept: OTOLARYNGOLOGY | Facility: CLINIC | Age: 66
End: 2021-09-13

## 2021-09-14 ENCOUNTER — TELEPHONE (OUTPATIENT)
Dept: OTOLARYNGOLOGY | Facility: CLINIC | Age: 66
End: 2021-09-14

## 2021-09-14 ENCOUNTER — OFFICE VISIT (OUTPATIENT)
Dept: OTOLARYNGOLOGY | Facility: CLINIC | Age: 66
End: 2021-09-14
Payer: MEDICARE

## 2021-09-14 VITALS
DIASTOLIC BLOOD PRESSURE: 81 MMHG | BODY MASS INDEX: 27.78 KG/M2 | SYSTOLIC BLOOD PRESSURE: 190 MMHG | TEMPERATURE: 98 F | HEART RATE: 68 BPM | WEIGHT: 177.38 LBS

## 2021-09-14 DIAGNOSIS — S02.2XXA CLOSED FRACTURE OF NASAL BONE, INITIAL ENCOUNTER: Primary | ICD-10-CM

## 2021-09-14 DIAGNOSIS — J34.2 NASAL SEPTAL DEVIATION: ICD-10-CM

## 2021-09-14 DIAGNOSIS — S02.2XXS CLOSED FRACTURE OF NASAL BONE, SEQUELA: Primary | ICD-10-CM

## 2021-09-14 DIAGNOSIS — Z01.818 PRE-OP TESTING: ICD-10-CM

## 2021-09-14 DIAGNOSIS — S02.2XXS CLOSED FRACTURE OF NASAL BONE, SEQUELA: ICD-10-CM

## 2021-09-14 DIAGNOSIS — M95.0 NASAL DEFORMITY, ACQUIRED: ICD-10-CM

## 2021-09-14 PROCEDURE — 1101F PT FALLS ASSESS-DOCD LE1/YR: CPT | Mod: HCNC,CPTII,S$GLB, | Performed by: SPECIALIST

## 2021-09-14 PROCEDURE — 1125F AMNT PAIN NOTED PAIN PRSNT: CPT | Mod: HCNC,CPTII,S$GLB, | Performed by: SPECIALIST

## 2021-09-14 PROCEDURE — 1159F PR MEDICATION LIST DOCUMENTED IN MEDICAL RECORD: ICD-10-PCS | Mod: HCNC,CPTII,S$GLB, | Performed by: SPECIALIST

## 2021-09-14 PROCEDURE — 3288F FALL RISK ASSESSMENT DOCD: CPT | Mod: HCNC,CPTII,S$GLB, | Performed by: SPECIALIST

## 2021-09-14 PROCEDURE — 3008F PR BODY MASS INDEX (BMI) DOCUMENTED: ICD-10-PCS | Mod: HCNC,CPTII,S$GLB, | Performed by: SPECIALIST

## 2021-09-14 PROCEDURE — 1101F PR PT FALLS ASSESS DOC 0-1 FALLS W/OUT INJ PAST YR: ICD-10-PCS | Mod: HCNC,CPTII,S$GLB, | Performed by: SPECIALIST

## 2021-09-14 PROCEDURE — 3008F BODY MASS INDEX DOCD: CPT | Mod: HCNC,CPTII,S$GLB, | Performed by: SPECIALIST

## 2021-09-14 PROCEDURE — 1159F MED LIST DOCD IN RCRD: CPT | Mod: HCNC,CPTII,S$GLB, | Performed by: SPECIALIST

## 2021-09-14 PROCEDURE — 3079F DIAST BP 80-89 MM HG: CPT | Mod: HCNC,CPTII,S$GLB, | Performed by: SPECIALIST

## 2021-09-14 PROCEDURE — 3079F PR MOST RECENT DIASTOLIC BLOOD PRESSURE 80-89 MM HG: ICD-10-PCS | Mod: HCNC,CPTII,S$GLB, | Performed by: SPECIALIST

## 2021-09-14 PROCEDURE — 1125F PR PAIN SEVERITY QUANTIFIED, PAIN PRESENT: ICD-10-PCS | Mod: HCNC,CPTII,S$GLB, | Performed by: SPECIALIST

## 2021-09-14 PROCEDURE — 1160F PR REVIEW ALL MEDS BY PRESCRIBER/CLIN PHARMACIST DOCUMENTED: ICD-10-PCS | Mod: HCNC,CPTII,S$GLB, | Performed by: SPECIALIST

## 2021-09-14 PROCEDURE — 3077F SYST BP >= 140 MM HG: CPT | Mod: HCNC,CPTII,S$GLB, | Performed by: SPECIALIST

## 2021-09-14 PROCEDURE — 3077F PR MOST RECENT SYSTOLIC BLOOD PRESSURE >= 140 MM HG: ICD-10-PCS | Mod: HCNC,CPTII,S$GLB, | Performed by: SPECIALIST

## 2021-09-14 PROCEDURE — 3288F PR FALLS RISK ASSESSMENT DOCUMENTED: ICD-10-PCS | Mod: HCNC,CPTII,S$GLB, | Performed by: SPECIALIST

## 2021-09-14 PROCEDURE — 1160F RVW MEDS BY RX/DR IN RCRD: CPT | Mod: HCNC,CPTII,S$GLB, | Performed by: SPECIALIST

## 2021-09-14 PROCEDURE — 99214 PR OFFICE/OUTPT VISIT, EST, LEVL IV, 30-39 MIN: ICD-10-PCS | Mod: HCNC,S$GLB,, | Performed by: SPECIALIST

## 2021-09-14 PROCEDURE — 99999 PR PBB SHADOW E&M-EST. PATIENT-LVL III: ICD-10-PCS | Mod: PBBFAC,,, | Performed by: SPECIALIST

## 2021-09-14 PROCEDURE — 99999 PR PBB SHADOW E&M-EST. PATIENT-LVL III: CPT | Mod: PBBFAC,,, | Performed by: SPECIALIST

## 2021-09-14 PROCEDURE — 99214 OFFICE O/P EST MOD 30 MIN: CPT | Mod: HCNC,S$GLB,, | Performed by: SPECIALIST

## 2021-09-16 ENCOUNTER — OFFICE VISIT (OUTPATIENT)
Dept: INTERNAL MEDICINE | Facility: CLINIC | Age: 66
End: 2021-09-16
Payer: MEDICARE

## 2021-09-16 ENCOUNTER — LAB VISIT (OUTPATIENT)
Dept: LAB | Facility: HOSPITAL | Age: 66
End: 2021-09-16
Attending: INTERNAL MEDICINE
Payer: MEDICARE

## 2021-09-16 VITALS
OXYGEN SATURATION: 99 % | HEIGHT: 67 IN | SYSTOLIC BLOOD PRESSURE: 166 MMHG | BODY MASS INDEX: 26.64 KG/M2 | DIASTOLIC BLOOD PRESSURE: 88 MMHG | HEART RATE: 58 BPM | WEIGHT: 169.75 LBS

## 2021-09-16 DIAGNOSIS — R60.0 PEDAL EDEMA: ICD-10-CM

## 2021-09-16 DIAGNOSIS — R06.09 DOE (DYSPNEA ON EXERTION): ICD-10-CM

## 2021-09-16 DIAGNOSIS — M47.816 LUMBAR SPONDYLOSIS: ICD-10-CM

## 2021-09-16 DIAGNOSIS — F32.A DEPRESSION, UNSPECIFIED DEPRESSION TYPE: ICD-10-CM

## 2021-09-16 DIAGNOSIS — I10 ESSENTIAL HYPERTENSION: Primary | ICD-10-CM

## 2021-09-16 DIAGNOSIS — I10 ESSENTIAL HYPERTENSION: ICD-10-CM

## 2021-09-16 PROCEDURE — 93010 EKG 12-LEAD: ICD-10-PCS | Mod: HCNC,S$GLB,, | Performed by: INTERNAL MEDICINE

## 2021-09-16 PROCEDURE — 93010 ELECTROCARDIOGRAM REPORT: CPT | Mod: HCNC,S$GLB,, | Performed by: INTERNAL MEDICINE

## 2021-09-16 PROCEDURE — 85025 COMPLETE CBC W/AUTO DIFF WBC: CPT | Mod: HCNC | Performed by: INTERNAL MEDICINE

## 2021-09-16 PROCEDURE — 99214 OFFICE O/P EST MOD 30 MIN: CPT | Mod: HCNC,S$GLB,, | Performed by: INTERNAL MEDICINE

## 2021-09-16 PROCEDURE — 3008F BODY MASS INDEX DOCD: CPT | Mod: HCNC,CPTII,S$GLB, | Performed by: INTERNAL MEDICINE

## 2021-09-16 PROCEDURE — 93005 ELECTROCARDIOGRAM TRACING: CPT | Mod: HCNC,S$GLB,, | Performed by: INTERNAL MEDICINE

## 2021-09-16 PROCEDURE — 1125F AMNT PAIN NOTED PAIN PRSNT: CPT | Mod: HCNC,CPTII,S$GLB, | Performed by: INTERNAL MEDICINE

## 2021-09-16 PROCEDURE — 3079F PR MOST RECENT DIASTOLIC BLOOD PRESSURE 80-89 MM HG: ICD-10-PCS | Mod: HCNC,CPTII,S$GLB, | Performed by: INTERNAL MEDICINE

## 2021-09-16 PROCEDURE — 99214 PR OFFICE/OUTPT VISIT, EST, LEVL IV, 30-39 MIN: ICD-10-PCS | Mod: HCNC,S$GLB,, | Performed by: INTERNAL MEDICINE

## 2021-09-16 PROCEDURE — 1159F MED LIST DOCD IN RCRD: CPT | Mod: HCNC,CPTII,S$GLB, | Performed by: INTERNAL MEDICINE

## 2021-09-16 PROCEDURE — 1160F PR REVIEW ALL MEDS BY PRESCRIBER/CLIN PHARMACIST DOCUMENTED: ICD-10-PCS | Mod: HCNC,CPTII,S$GLB, | Performed by: INTERNAL MEDICINE

## 2021-09-16 PROCEDURE — 3079F DIAST BP 80-89 MM HG: CPT | Mod: HCNC,CPTII,S$GLB, | Performed by: INTERNAL MEDICINE

## 2021-09-16 PROCEDURE — 1160F RVW MEDS BY RX/DR IN RCRD: CPT | Mod: HCNC,CPTII,S$GLB, | Performed by: INTERNAL MEDICINE

## 2021-09-16 PROCEDURE — 83880 ASSAY OF NATRIURETIC PEPTIDE: CPT | Mod: HCNC | Performed by: INTERNAL MEDICINE

## 2021-09-16 PROCEDURE — 3077F SYST BP >= 140 MM HG: CPT | Mod: HCNC,CPTII,S$GLB, | Performed by: INTERNAL MEDICINE

## 2021-09-16 PROCEDURE — 99999 PR PBB SHADOW E&M-EST. PATIENT-LVL III: CPT | Mod: PBBFAC,HCNC,, | Performed by: INTERNAL MEDICINE

## 2021-09-16 PROCEDURE — 1101F PR PT FALLS ASSESS DOC 0-1 FALLS W/OUT INJ PAST YR: ICD-10-PCS | Mod: HCNC,CPTII,S$GLB, | Performed by: INTERNAL MEDICINE

## 2021-09-16 PROCEDURE — 99499 UNLISTED E&M SERVICE: CPT | Mod: HCNC,S$GLB,, | Performed by: INTERNAL MEDICINE

## 2021-09-16 PROCEDURE — 93005 EKG 12-LEAD: ICD-10-PCS | Mod: HCNC,S$GLB,, | Performed by: INTERNAL MEDICINE

## 2021-09-16 PROCEDURE — 99999 PR PBB SHADOW E&M-EST. PATIENT-LVL III: ICD-10-PCS | Mod: PBBFAC,HCNC,, | Performed by: INTERNAL MEDICINE

## 2021-09-16 PROCEDURE — 80048 BASIC METABOLIC PNL TOTAL CA: CPT | Mod: HCNC | Performed by: INTERNAL MEDICINE

## 2021-09-16 PROCEDURE — 99499 RISK ADDL DX/OHS AUDIT: ICD-10-PCS | Mod: HCNC,S$GLB,, | Performed by: INTERNAL MEDICINE

## 2021-09-16 PROCEDURE — 1101F PT FALLS ASSESS-DOCD LE1/YR: CPT | Mod: HCNC,CPTII,S$GLB, | Performed by: INTERNAL MEDICINE

## 2021-09-16 PROCEDURE — 3288F PR FALLS RISK ASSESSMENT DOCUMENTED: ICD-10-PCS | Mod: HCNC,CPTII,S$GLB, | Performed by: INTERNAL MEDICINE

## 2021-09-16 PROCEDURE — 3077F PR MOST RECENT SYSTOLIC BLOOD PRESSURE >= 140 MM HG: ICD-10-PCS | Mod: HCNC,CPTII,S$GLB, | Performed by: INTERNAL MEDICINE

## 2021-09-16 PROCEDURE — 84443 ASSAY THYROID STIM HORMONE: CPT | Mod: HCNC | Performed by: INTERNAL MEDICINE

## 2021-09-16 PROCEDURE — 1159F PR MEDICATION LIST DOCUMENTED IN MEDICAL RECORD: ICD-10-PCS | Mod: HCNC,CPTII,S$GLB, | Performed by: INTERNAL MEDICINE

## 2021-09-16 PROCEDURE — 36415 COLL VENOUS BLD VENIPUNCTURE: CPT | Mod: HCNC | Performed by: INTERNAL MEDICINE

## 2021-09-16 PROCEDURE — 3288F FALL RISK ASSESSMENT DOCD: CPT | Mod: HCNC,CPTII,S$GLB, | Performed by: INTERNAL MEDICINE

## 2021-09-16 PROCEDURE — 1125F PR PAIN SEVERITY QUANTIFIED, PAIN PRESENT: ICD-10-PCS | Mod: HCNC,CPTII,S$GLB, | Performed by: INTERNAL MEDICINE

## 2021-09-16 PROCEDURE — 3008F PR BODY MASS INDEX (BMI) DOCUMENTED: ICD-10-PCS | Mod: HCNC,CPTII,S$GLB, | Performed by: INTERNAL MEDICINE

## 2021-09-16 RX ORDER — ARIPIPRAZOLE 15 MG/1
15 TABLET ORAL DAILY
Status: ON HOLD | COMMUNITY
End: 2023-09-25 | Stop reason: SDUPTHER

## 2021-09-17 ENCOUNTER — PATIENT MESSAGE (OUTPATIENT)
Dept: INTERNAL MEDICINE | Facility: CLINIC | Age: 66
End: 2021-09-17

## 2021-09-17 ENCOUNTER — ANESTHESIA EVENT (OUTPATIENT)
Dept: SURGERY | Facility: OTHER | Age: 66
End: 2021-09-17
Payer: MEDICARE

## 2021-09-17 ENCOUNTER — HOSPITAL ENCOUNTER (OUTPATIENT)
Dept: PREADMISSION TESTING | Facility: OTHER | Age: 66
Discharge: HOME | End: 2021-09-17
Attending: SPECIALIST
Payer: MEDICARE

## 2021-09-17 VITALS
WEIGHT: 177 LBS | TEMPERATURE: 99 F | BODY MASS INDEX: 28.45 KG/M2 | DIASTOLIC BLOOD PRESSURE: 74 MMHG | OXYGEN SATURATION: 95 % | SYSTOLIC BLOOD PRESSURE: 198 MMHG | HEIGHT: 66 IN | HEART RATE: 70 BPM

## 2021-09-17 DIAGNOSIS — Z01.818 PRE-OP TESTING: ICD-10-CM

## 2021-09-17 LAB
ANION GAP SERPL CALC-SCNC: 11 MMOL/L (ref 8–16)
BASOPHILS # BLD AUTO: 0.06 K/UL (ref 0–0.2)
BASOPHILS NFR BLD: 0.8 % (ref 0–1.9)
BNP SERPL-MCNC: 115 PG/ML (ref 0–99)
BUN SERPL-MCNC: 15 MG/DL (ref 8–23)
CALCIUM SERPL-MCNC: 10 MG/DL (ref 8.7–10.5)
CHLORIDE SERPL-SCNC: 103 MMOL/L (ref 95–110)
CO2 SERPL-SCNC: 26 MMOL/L (ref 23–29)
CREAT SERPL-MCNC: 1 MG/DL (ref 0.5–1.4)
DIFFERENTIAL METHOD: ABNORMAL
EOSINOPHIL # BLD AUTO: 0.5 K/UL (ref 0–0.5)
EOSINOPHIL NFR BLD: 6.3 % (ref 0–8)
ERYTHROCYTE [DISTWIDTH] IN BLOOD BY AUTOMATED COUNT: 14.8 % (ref 11.5–14.5)
EST. GFR  (AFRICAN AMERICAN): >60 ML/MIN/1.73 M^2
EST. GFR  (NON AFRICAN AMERICAN): 58.4 ML/MIN/1.73 M^2
GLUCOSE SERPL-MCNC: 90 MG/DL (ref 70–110)
HCT VFR BLD AUTO: 34.3 % (ref 37–48.5)
HGB BLD-MCNC: 10.2 G/DL (ref 12–16)
IMM GRANULOCYTES # BLD AUTO: 0.02 K/UL (ref 0–0.04)
IMM GRANULOCYTES NFR BLD AUTO: 0.3 % (ref 0–0.5)
LYMPHOCYTES # BLD AUTO: 1.4 K/UL (ref 1–4.8)
LYMPHOCYTES NFR BLD: 18.4 % (ref 18–48)
MCH RBC QN AUTO: 27.3 PG (ref 27–31)
MCHC RBC AUTO-ENTMCNC: 29.7 G/DL (ref 32–36)
MCV RBC AUTO: 92 FL (ref 82–98)
MONOCYTES # BLD AUTO: 0.7 K/UL (ref 0.3–1)
MONOCYTES NFR BLD: 8.5 % (ref 4–15)
NEUTROPHILS # BLD AUTO: 5.1 K/UL (ref 1.8–7.7)
NEUTROPHILS NFR BLD: 65.7 % (ref 38–73)
NRBC BLD-RTO: 0 /100 WBC
PLATELET # BLD AUTO: 249 K/UL (ref 150–450)
PMV BLD AUTO: 11.5 FL (ref 9.2–12.9)
POTASSIUM SERPL-SCNC: 4.4 MMOL/L (ref 3.5–5.1)
RBC # BLD AUTO: 3.73 M/UL (ref 4–5.4)
SODIUM SERPL-SCNC: 140 MMOL/L (ref 136–145)
TSH SERPL DL<=0.005 MIU/L-ACNC: 3.03 UIU/ML (ref 0.4–4)
WBC # BLD AUTO: 7.67 K/UL (ref 3.9–12.7)

## 2021-09-17 PROCEDURE — U0003 INFECTIOUS AGENT DETECTION BY NUCLEIC ACID (DNA OR RNA); SEVERE ACUTE RESPIRATORY SYNDROME CORONAVIRUS 2 (SARS-COV-2) (CORONAVIRUS DISEASE [COVID-19]), AMPLIFIED PROBE TECHNIQUE, MAKING USE OF HIGH THROUGHPUT TECHNOLOGIES AS DESCRIBED BY CMS-2020-01-R: HCPCS | Mod: HCNC | Performed by: SPECIALIST

## 2021-09-17 PROCEDURE — U0005 INFEC AGEN DETEC AMPLI PROBE: HCPCS | Performed by: SPECIALIST

## 2021-09-17 RX ORDER — LIDOCAINE HYDROCHLORIDE 10 MG/ML
0.5 INJECTION, SOLUTION EPIDURAL; INFILTRATION; INTRACAUDAL; PERINEURAL ONCE
Status: CANCELLED | OUTPATIENT
Start: 2021-09-17 | End: 2021-09-17

## 2021-09-17 RX ORDER — SODIUM CHLORIDE, SODIUM LACTATE, POTASSIUM CHLORIDE, CALCIUM CHLORIDE 600; 310; 30; 20 MG/100ML; MG/100ML; MG/100ML; MG/100ML
INJECTION, SOLUTION INTRAVENOUS CONTINUOUS
Status: CANCELLED | OUTPATIENT
Start: 2021-09-17

## 2021-09-17 RX ORDER — ACETAMINOPHEN 500 MG
10 TABLET ORAL NIGHTLY PRN
COMMUNITY

## 2021-09-17 RX ORDER — LOSARTAN POTASSIUM AND HYDROCHLOROTHIAZIDE 12.5; 5 MG/1; MG/1
1 TABLET ORAL DAILY
Qty: 30 TABLET | Refills: 2 | Status: SHIPPED | OUTPATIENT
Start: 2021-09-17 | End: 2021-10-04

## 2021-09-18 LAB
SARS-COV-2 RNA RESP QL NAA+PROBE: NOT DETECTED
SARS-COV-2- CYCLE NUMBER: NORMAL

## 2021-09-20 ENCOUNTER — ANESTHESIA (OUTPATIENT)
Dept: SURGERY | Facility: OTHER | Age: 66
End: 2021-09-20
Payer: MEDICARE

## 2021-09-20 ENCOUNTER — HOSPITAL ENCOUNTER (OUTPATIENT)
Facility: OTHER | Age: 66
Discharge: HOME | End: 2021-09-20
Attending: SPECIALIST | Admitting: SPECIALIST
Payer: MEDICARE

## 2021-09-20 VITALS
WEIGHT: 177 LBS | BODY MASS INDEX: 28.45 KG/M2 | HEART RATE: 83 BPM | TEMPERATURE: 98 F | RESPIRATION RATE: 16 BRPM | HEIGHT: 66 IN | SYSTOLIC BLOOD PRESSURE: 130 MMHG | OXYGEN SATURATION: 95 % | DIASTOLIC BLOOD PRESSURE: 68 MMHG

## 2021-09-20 DIAGNOSIS — S02.2XXS CLOSED FRACTURE OF NASAL BONE, SEQUELA: ICD-10-CM

## 2021-09-20 PROCEDURE — 25000003 PHARM REV CODE 250: Mod: HCNC | Performed by: NURSE ANESTHETIST, CERTIFIED REGISTERED

## 2021-09-20 PROCEDURE — 71000033 HC RECOVERY, INTIAL HOUR: Mod: HCNC | Performed by: SPECIALIST

## 2021-09-20 PROCEDURE — 63600175 PHARM REV CODE 636 W HCPCS: Mod: HCNC | Performed by: NURSE ANESTHETIST, CERTIFIED REGISTERED

## 2021-09-20 PROCEDURE — 71000015 HC POSTOP RECOV 1ST HR: Mod: HCNC | Performed by: SPECIALIST

## 2021-09-20 PROCEDURE — 21320 PR TREATMENT, NASAL BONE FRACTURE, CLOSED W/MANIP, W/STABILIZ: ICD-10-PCS | Mod: HCNC,,, | Performed by: SPECIALIST

## 2021-09-20 PROCEDURE — 63600175 PHARM REV CODE 636 W HCPCS: Mod: HCNC | Performed by: ANESTHESIOLOGY

## 2021-09-20 PROCEDURE — 25000003 PHARM REV CODE 250: Mod: HCNC | Performed by: SPECIALIST

## 2021-09-20 PROCEDURE — 36000704 HC OR TIME LEV I 1ST 15 MIN: Mod: HCNC | Performed by: SPECIALIST

## 2021-09-20 PROCEDURE — 27201423 OPTIME MED/SURG SUP & DEVICES STERILE SUPPLY: Mod: HCNC | Performed by: SPECIALIST

## 2021-09-20 PROCEDURE — 21320 CLSD TX NSL FX W/MNPJ&STABLJ: CPT | Mod: HCNC,,, | Performed by: SPECIALIST

## 2021-09-20 PROCEDURE — 37000009 HC ANESTHESIA EA ADD 15 MINS: Mod: HCNC | Performed by: SPECIALIST

## 2021-09-20 PROCEDURE — 25000003 PHARM REV CODE 250: Mod: HCNC | Performed by: ANESTHESIOLOGY

## 2021-09-20 PROCEDURE — 71000016 HC POSTOP RECOV ADDL HR: Mod: HCNC | Performed by: SPECIALIST

## 2021-09-20 PROCEDURE — 71000039 HC RECOVERY, EACH ADD'L HOUR: Mod: HCNC | Performed by: SPECIALIST

## 2021-09-20 PROCEDURE — 37000008 HC ANESTHESIA 1ST 15 MINUTES: Mod: HCNC | Performed by: SPECIALIST

## 2021-09-20 PROCEDURE — 63600175 PHARM REV CODE 636 W HCPCS: Mod: HCNC | Performed by: SPECIALIST

## 2021-09-20 PROCEDURE — 36000705 HC OR TIME LEV I EA ADD 15 MIN: Mod: HCNC | Performed by: SPECIALIST

## 2021-09-20 RX ORDER — PROCHLORPERAZINE EDISYLATE 5 MG/ML
5 INJECTION INTRAMUSCULAR; INTRAVENOUS EVERY 30 MIN PRN
Status: DISCONTINUED | OUTPATIENT
Start: 2021-09-20 | End: 2021-09-20 | Stop reason: HOSPADM

## 2021-09-20 RX ORDER — CEFAZOLIN SODIUM 2 G/50ML
2 SOLUTION INTRAVENOUS
Status: CANCELLED | OUTPATIENT
Start: 2021-09-20

## 2021-09-20 RX ORDER — HYDROMORPHONE HYDROCHLORIDE 2 MG/ML
0.4 INJECTION, SOLUTION INTRAMUSCULAR; INTRAVENOUS; SUBCUTANEOUS EVERY 5 MIN PRN
Status: DISCONTINUED | OUTPATIENT
Start: 2021-09-20 | End: 2021-09-20 | Stop reason: HOSPADM

## 2021-09-20 RX ORDER — CEFAZOLIN SODIUM 1 G/3ML
INJECTION, POWDER, FOR SOLUTION INTRAMUSCULAR; INTRAVENOUS
Status: DISCONTINUED | OUTPATIENT
Start: 2021-09-20 | End: 2021-09-20

## 2021-09-20 RX ORDER — OXYCODONE HYDROCHLORIDE 5 MG/1
5 TABLET ORAL
Status: DISCONTINUED | OUTPATIENT
Start: 2021-09-20 | End: 2021-09-20 | Stop reason: HOSPADM

## 2021-09-20 RX ORDER — LIDOCAINE HYDROCHLORIDE 10 MG/ML
1 INJECTION, SOLUTION EPIDURAL; INFILTRATION; INTRACAUDAL; PERINEURAL ONCE
Status: CANCELLED | OUTPATIENT
Start: 2021-09-20 | End: 2021-09-20

## 2021-09-20 RX ORDER — ACETAMINOPHEN 325 MG/1
650 TABLET ORAL EVERY 4 HOURS PRN
Status: DISCONTINUED | OUTPATIENT
Start: 2021-09-20 | End: 2021-09-20 | Stop reason: HOSPADM

## 2021-09-20 RX ORDER — SODIUM CHLORIDE, SODIUM LACTATE, POTASSIUM CHLORIDE, CALCIUM CHLORIDE 600; 310; 30; 20 MG/100ML; MG/100ML; MG/100ML; MG/100ML
INJECTION, SOLUTION INTRAVENOUS CONTINUOUS
Status: DISCONTINUED | OUTPATIENT
Start: 2021-09-20 | End: 2021-09-20 | Stop reason: HOSPADM

## 2021-09-20 RX ORDER — PROPOFOL 10 MG/ML
VIAL (ML) INTRAVENOUS
Status: DISCONTINUED | OUTPATIENT
Start: 2021-09-20 | End: 2021-09-20

## 2021-09-20 RX ORDER — OXYCODONE HYDROCHLORIDE 5 MG/1
5 TABLET ORAL ONCE
Status: COMPLETED | OUTPATIENT
Start: 2021-09-20 | End: 2021-09-20

## 2021-09-20 RX ORDER — ROCURONIUM BROMIDE 10 MG/ML
INJECTION, SOLUTION INTRAVENOUS
Status: DISCONTINUED | OUTPATIENT
Start: 2021-09-20 | End: 2021-09-20

## 2021-09-20 RX ORDER — LIDOCAINE HYDROCHLORIDE 20 MG/ML
INJECTION INTRAVENOUS
Status: DISCONTINUED | OUTPATIENT
Start: 2021-09-20 | End: 2021-09-20

## 2021-09-20 RX ORDER — HYDROCODONE BITARTRATE AND ACETAMINOPHEN 5; 325 MG/1; MG/1
1 TABLET ORAL EVERY 4 HOURS PRN
Status: CANCELLED | OUTPATIENT
Start: 2021-09-20

## 2021-09-20 RX ORDER — HYDROCODONE BITARTRATE AND ACETAMINOPHEN 10; 325 MG/1; MG/1
1 TABLET ORAL EVERY 4 HOURS PRN
Status: DISCONTINUED | OUTPATIENT
Start: 2021-09-20 | End: 2021-09-20 | Stop reason: HOSPADM

## 2021-09-20 RX ORDER — HYDROCODONE BITARTRATE AND ACETAMINOPHEN 5; 325 MG/1; MG/1
1 TABLET ORAL EVERY 6 HOURS PRN
Qty: 15 TABLET | Refills: 0 | Status: SHIPPED | OUTPATIENT
Start: 2021-09-20 | End: 2021-12-08

## 2021-09-20 RX ORDER — MEPERIDINE HYDROCHLORIDE 25 MG/ML
12.5 INJECTION INTRAMUSCULAR; INTRAVENOUS; SUBCUTANEOUS ONCE AS NEEDED
Status: DISCONTINUED | OUTPATIENT
Start: 2021-09-20 | End: 2021-09-20 | Stop reason: HOSPADM

## 2021-09-20 RX ORDER — CEPHALEXIN 500 MG/1
500 CAPSULE ORAL EVERY 6 HOURS
Qty: 40 CAPSULE | Refills: 0 | Status: SHIPPED | OUTPATIENT
Start: 2021-09-20 | End: 2021-12-10 | Stop reason: CLARIF

## 2021-09-20 RX ORDER — LIDOCAINE HYDROCHLORIDE 10 MG/ML
0.5 INJECTION, SOLUTION EPIDURAL; INFILTRATION; INTRACAUDAL; PERINEURAL ONCE
Status: DISCONTINUED | OUTPATIENT
Start: 2021-09-20 | End: 2021-09-20 | Stop reason: HOSPADM

## 2021-09-20 RX ORDER — SODIUM CHLORIDE 0.9 % (FLUSH) 0.9 %
3 SYRINGE (ML) INJECTION
Status: DISCONTINUED | OUTPATIENT
Start: 2021-09-20 | End: 2021-09-20 | Stop reason: HOSPADM

## 2021-09-20 RX ORDER — FENTANYL CITRATE 50 UG/ML
INJECTION, SOLUTION INTRAMUSCULAR; INTRAVENOUS
Status: DISCONTINUED | OUTPATIENT
Start: 2021-09-20 | End: 2021-09-20

## 2021-09-20 RX ORDER — DEXAMETHASONE SODIUM PHOSPHATE 4 MG/ML
INJECTION, SOLUTION INTRA-ARTICULAR; INTRALESIONAL; INTRAMUSCULAR; INTRAVENOUS; SOFT TISSUE
Status: DISCONTINUED | OUTPATIENT
Start: 2021-09-20 | End: 2021-09-20

## 2021-09-20 RX ORDER — DEXAMETHASONE SODIUM PHOSPHATE 4 MG/ML
8 INJECTION, SOLUTION INTRA-ARTICULAR; INTRALESIONAL; INTRAMUSCULAR; INTRAVENOUS; SOFT TISSUE
Status: CANCELLED | OUTPATIENT
Start: 2021-09-20

## 2021-09-20 RX ORDER — ONDANSETRON 8 MG/1
8 TABLET, ORALLY DISINTEGRATING ORAL EVERY 6 HOURS PRN
Status: DISCONTINUED | OUTPATIENT
Start: 2021-09-20 | End: 2021-09-20 | Stop reason: HOSPADM

## 2021-09-20 RX ORDER — EPINEPHRINE 1 MG/ML
INJECTION, SOLUTION INTRACARDIAC; INTRAMUSCULAR; INTRAVENOUS; SUBCUTANEOUS
Status: DISCONTINUED | OUTPATIENT
Start: 2021-09-20 | End: 2021-09-20 | Stop reason: HOSPADM

## 2021-09-20 RX ORDER — LABETALOL HYDROCHLORIDE 5 MG/ML
INJECTION, SOLUTION INTRAVENOUS
Status: DISCONTINUED | OUTPATIENT
Start: 2021-09-20 | End: 2021-09-20

## 2021-09-20 RX ORDER — HYDRALAZINE HYDROCHLORIDE 20 MG/ML
INJECTION INTRAMUSCULAR; INTRAVENOUS
Status: DISCONTINUED | OUTPATIENT
Start: 2021-09-20 | End: 2021-09-20

## 2021-09-20 RX ORDER — ONDANSETRON 2 MG/ML
INJECTION INTRAMUSCULAR; INTRAVENOUS
Status: DISCONTINUED | OUTPATIENT
Start: 2021-09-20 | End: 2021-09-20

## 2021-09-20 RX ADMIN — DEXAMETHASONE SODIUM PHOSPHATE 8 MG: 4 INJECTION, SOLUTION INTRAMUSCULAR; INTRAVENOUS at 07:09

## 2021-09-20 RX ADMIN — ROCURONIUM BROMIDE 40 MG: 10 INJECTION, SOLUTION INTRAVENOUS at 07:09

## 2021-09-20 RX ADMIN — HYDROMORPHONE HYDROCHLORIDE 0.4 MG: 2 INJECTION INTRAMUSCULAR; INTRAVENOUS; SUBCUTANEOUS at 08:09

## 2021-09-20 RX ADMIN — SUGAMMADEX 400 MG: 100 INJECTION, SOLUTION INTRAVENOUS at 07:09

## 2021-09-20 RX ADMIN — OXYCODONE 5 MG: 5 TABLET ORAL at 08:09

## 2021-09-20 RX ADMIN — OXYCODONE 5 MG: 5 TABLET ORAL at 10:09

## 2021-09-20 RX ADMIN — PROPOFOL 50 MG: 10 INJECTION, EMULSION INTRAVENOUS at 07:09

## 2021-09-20 RX ADMIN — ONDANSETRON HYDROCHLORIDE 4 MG: 2 INJECTION INTRAMUSCULAR; INTRAVENOUS at 07:09

## 2021-09-20 RX ADMIN — LIDOCAINE HYDROCHLORIDE 100 MG: 20 INJECTION, SOLUTION INTRAVENOUS at 07:09

## 2021-09-20 RX ADMIN — SODIUM CHLORIDE, SODIUM LACTATE, POTASSIUM CHLORIDE, AND CALCIUM CHLORIDE: 600; 310; 30; 20 INJECTION, SOLUTION INTRAVENOUS at 07:09

## 2021-09-20 RX ADMIN — PROPOFOL 170 MG: 10 INJECTION, EMULSION INTRAVENOUS at 07:09

## 2021-09-20 RX ADMIN — FENTANYL CITRATE 100 MCG: 50 INJECTION, SOLUTION INTRAMUSCULAR; INTRAVENOUS at 07:09

## 2021-09-20 RX ADMIN — ACETAMINOPHEN 650 MG: 325 TABLET, FILM COATED ORAL at 09:09

## 2021-09-20 RX ADMIN — LABETALOL HYDROCHLORIDE 20 MG: 5 INJECTION INTRAVENOUS at 07:09

## 2021-09-20 RX ADMIN — HYDRALAZINE HYDROCHLORIDE 10 MG: 20 INJECTION INTRAMUSCULAR; INTRAVENOUS at 07:09

## 2021-09-20 RX ADMIN — CEFAZOLIN 2 G: 330 INJECTION, POWDER, FOR SOLUTION INTRAMUSCULAR; INTRAVENOUS at 07:09

## 2021-09-27 ENCOUNTER — OFFICE VISIT (OUTPATIENT)
Dept: OTOLARYNGOLOGY | Facility: CLINIC | Age: 66
End: 2021-09-27
Payer: MEDICARE

## 2021-09-27 VITALS
WEIGHT: 174.63 LBS | HEART RATE: 65 BPM | BODY MASS INDEX: 28.18 KG/M2 | SYSTOLIC BLOOD PRESSURE: 185 MMHG | TEMPERATURE: 98 F | DIASTOLIC BLOOD PRESSURE: 73 MMHG

## 2021-09-27 DIAGNOSIS — M95.0 NASAL DEFORMITY, ACQUIRED: ICD-10-CM

## 2021-09-27 DIAGNOSIS — J34.2 NASAL SEPTAL DEVIATION: ICD-10-CM

## 2021-09-27 DIAGNOSIS — S02.2XXS CLOSED FRACTURE OF NASAL BONE, SEQUELA: Primary | ICD-10-CM

## 2021-09-27 PROCEDURE — 3288F PR FALLS RISK ASSESSMENT DOCUMENTED: ICD-10-PCS | Mod: HCNC,CPTII,S$GLB, | Performed by: SPECIALIST

## 2021-09-27 PROCEDURE — 3077F SYST BP >= 140 MM HG: CPT | Mod: HCNC,CPTII,S$GLB, | Performed by: SPECIALIST

## 2021-09-27 PROCEDURE — 1160F PR REVIEW ALL MEDS BY PRESCRIBER/CLIN PHARMACIST DOCUMENTED: ICD-10-PCS | Mod: HCNC,CPTII,S$GLB, | Performed by: SPECIALIST

## 2021-09-27 PROCEDURE — 99024 PR POST-OP FOLLOW-UP VISIT: ICD-10-PCS | Mod: HCNC,S$GLB,, | Performed by: SPECIALIST

## 2021-09-27 PROCEDURE — 1101F PR PT FALLS ASSESS DOC 0-1 FALLS W/OUT INJ PAST YR: ICD-10-PCS | Mod: HCNC,CPTII,S$GLB, | Performed by: SPECIALIST

## 2021-09-27 PROCEDURE — 1159F PR MEDICATION LIST DOCUMENTED IN MEDICAL RECORD: ICD-10-PCS | Mod: HCNC,CPTII,S$GLB, | Performed by: SPECIALIST

## 2021-09-27 PROCEDURE — 1159F MED LIST DOCD IN RCRD: CPT | Mod: HCNC,CPTII,S$GLB, | Performed by: SPECIALIST

## 2021-09-27 PROCEDURE — 1125F PR PAIN SEVERITY QUANTIFIED, PAIN PRESENT: ICD-10-PCS | Mod: HCNC,CPTII,S$GLB, | Performed by: SPECIALIST

## 2021-09-27 PROCEDURE — 99999 PR PBB SHADOW E&M-EST. PATIENT-LVL III: CPT | Mod: PBBFAC,HCNC,, | Performed by: SPECIALIST

## 2021-09-27 PROCEDURE — 3078F DIAST BP <80 MM HG: CPT | Mod: HCNC,CPTII,S$GLB, | Performed by: SPECIALIST

## 2021-09-27 PROCEDURE — 99999 PR PBB SHADOW E&M-EST. PATIENT-LVL III: ICD-10-PCS | Mod: PBBFAC,HCNC,, | Performed by: SPECIALIST

## 2021-09-27 PROCEDURE — 3008F BODY MASS INDEX DOCD: CPT | Mod: HCNC,CPTII,S$GLB, | Performed by: SPECIALIST

## 2021-09-27 PROCEDURE — 3008F PR BODY MASS INDEX (BMI) DOCUMENTED: ICD-10-PCS | Mod: HCNC,CPTII,S$GLB, | Performed by: SPECIALIST

## 2021-09-27 PROCEDURE — 99024 POSTOP FOLLOW-UP VISIT: CPT | Mod: HCNC,S$GLB,, | Performed by: SPECIALIST

## 2021-09-27 PROCEDURE — 1101F PT FALLS ASSESS-DOCD LE1/YR: CPT | Mod: HCNC,CPTII,S$GLB, | Performed by: SPECIALIST

## 2021-09-27 PROCEDURE — 3078F PR MOST RECENT DIASTOLIC BLOOD PRESSURE < 80 MM HG: ICD-10-PCS | Mod: HCNC,CPTII,S$GLB, | Performed by: SPECIALIST

## 2021-09-27 PROCEDURE — 1125F AMNT PAIN NOTED PAIN PRSNT: CPT | Mod: HCNC,CPTII,S$GLB, | Performed by: SPECIALIST

## 2021-09-27 PROCEDURE — 3288F FALL RISK ASSESSMENT DOCD: CPT | Mod: HCNC,CPTII,S$GLB, | Performed by: SPECIALIST

## 2021-09-27 PROCEDURE — 3077F PR MOST RECENT SYSTOLIC BLOOD PRESSURE >= 140 MM HG: ICD-10-PCS | Mod: HCNC,CPTII,S$GLB, | Performed by: SPECIALIST

## 2021-09-27 PROCEDURE — 1160F RVW MEDS BY RX/DR IN RCRD: CPT | Mod: HCNC,CPTII,S$GLB, | Performed by: SPECIALIST

## 2021-10-04 RX ORDER — LOSARTAN POTASSIUM AND HYDROCHLOROTHIAZIDE 12.5; 5 MG/1; MG/1
TABLET ORAL
Qty: 90 TABLET | Refills: 1 | Status: SHIPPED | OUTPATIENT
Start: 2021-10-04 | End: 2021-10-26 | Stop reason: SDUPTHER

## 2021-10-12 ENCOUNTER — OFFICE VISIT (OUTPATIENT)
Dept: OTOLARYNGOLOGY | Facility: CLINIC | Age: 66
End: 2021-10-12
Payer: MEDICARE

## 2021-10-12 VITALS
BODY MASS INDEX: 27.59 KG/M2 | SYSTOLIC BLOOD PRESSURE: 136 MMHG | DIASTOLIC BLOOD PRESSURE: 63 MMHG | TEMPERATURE: 98 F | HEART RATE: 68 BPM | WEIGHT: 170.94 LBS

## 2021-10-12 DIAGNOSIS — J34.2 NASAL SEPTAL DEVIATION: ICD-10-CM

## 2021-10-12 DIAGNOSIS — M95.0 NASAL DEFORMITY, ACQUIRED: ICD-10-CM

## 2021-10-12 DIAGNOSIS — S02.2XXS CLOSED FRACTURE OF NASAL BONE, SEQUELA: Primary | ICD-10-CM

## 2021-10-12 PROCEDURE — 3008F PR BODY MASS INDEX (BMI) DOCUMENTED: ICD-10-PCS | Mod: HCNC,CPTII,S$GLB, | Performed by: SPECIALIST

## 2021-10-12 PROCEDURE — 1100F PR PT FALLS ASSESS DOC 2+ FALLS/FALL W/INJURY/YR: ICD-10-PCS | Mod: HCNC,CPTII,S$GLB, | Performed by: SPECIALIST

## 2021-10-12 PROCEDURE — 1159F MED LIST DOCD IN RCRD: CPT | Mod: HCNC,CPTII,S$GLB, | Performed by: SPECIALIST

## 2021-10-12 PROCEDURE — 3075F SYST BP GE 130 - 139MM HG: CPT | Mod: HCNC,CPTII,S$GLB, | Performed by: SPECIALIST

## 2021-10-12 PROCEDURE — 1160F PR REVIEW ALL MEDS BY PRESCRIBER/CLIN PHARMACIST DOCUMENTED: ICD-10-PCS | Mod: HCNC,CPTII,S$GLB, | Performed by: SPECIALIST

## 2021-10-12 PROCEDURE — 3288F FALL RISK ASSESSMENT DOCD: CPT | Mod: HCNC,CPTII,S$GLB, | Performed by: SPECIALIST

## 2021-10-12 PROCEDURE — 1160F RVW MEDS BY RX/DR IN RCRD: CPT | Mod: HCNC,CPTII,S$GLB, | Performed by: SPECIALIST

## 2021-10-12 PROCEDURE — 3288F PR FALLS RISK ASSESSMENT DOCUMENTED: ICD-10-PCS | Mod: HCNC,CPTII,S$GLB, | Performed by: SPECIALIST

## 2021-10-12 PROCEDURE — 3078F PR MOST RECENT DIASTOLIC BLOOD PRESSURE < 80 MM HG: ICD-10-PCS | Mod: HCNC,CPTII,S$GLB, | Performed by: SPECIALIST

## 2021-10-12 PROCEDURE — 99213 PR OFFICE/OUTPT VISIT, EST, LEVL III, 20-29 MIN: ICD-10-PCS | Mod: HCNC,S$GLB,, | Performed by: SPECIALIST

## 2021-10-12 PROCEDURE — 3075F PR MOST RECENT SYSTOLIC BLOOD PRESS GE 130-139MM HG: ICD-10-PCS | Mod: HCNC,CPTII,S$GLB, | Performed by: SPECIALIST

## 2021-10-12 PROCEDURE — 3078F DIAST BP <80 MM HG: CPT | Mod: HCNC,CPTII,S$GLB, | Performed by: SPECIALIST

## 2021-10-12 PROCEDURE — 1100F PTFALLS ASSESS-DOCD GE2>/YR: CPT | Mod: HCNC,CPTII,S$GLB, | Performed by: SPECIALIST

## 2021-10-12 PROCEDURE — 3008F BODY MASS INDEX DOCD: CPT | Mod: HCNC,CPTII,S$GLB, | Performed by: SPECIALIST

## 2021-10-12 PROCEDURE — 99999 PR PBB SHADOW E&M-EST. PATIENT-LVL III: ICD-10-PCS | Mod: PBBFAC,HCNC,, | Performed by: SPECIALIST

## 2021-10-12 PROCEDURE — 1125F AMNT PAIN NOTED PAIN PRSNT: CPT | Mod: HCNC,CPTII,S$GLB, | Performed by: SPECIALIST

## 2021-10-12 PROCEDURE — 1125F PR PAIN SEVERITY QUANTIFIED, PAIN PRESENT: ICD-10-PCS | Mod: HCNC,CPTII,S$GLB, | Performed by: SPECIALIST

## 2021-10-12 PROCEDURE — 1159F PR MEDICATION LIST DOCUMENTED IN MEDICAL RECORD: ICD-10-PCS | Mod: HCNC,CPTII,S$GLB, | Performed by: SPECIALIST

## 2021-10-12 PROCEDURE — 99999 PR PBB SHADOW E&M-EST. PATIENT-LVL III: CPT | Mod: PBBFAC,HCNC,, | Performed by: SPECIALIST

## 2021-10-12 PROCEDURE — 99213 OFFICE O/P EST LOW 20 MIN: CPT | Mod: HCNC,S$GLB,, | Performed by: SPECIALIST

## 2021-10-26 RX ORDER — CYCLOBENZAPRINE HCL 10 MG
10 TABLET ORAL 3 TIMES DAILY PRN
Qty: 90 TABLET | Refills: 3 | Status: SHIPPED | OUTPATIENT
Start: 2021-10-26

## 2021-10-26 RX ORDER — LOSARTAN POTASSIUM AND HYDROCHLOROTHIAZIDE 12.5; 5 MG/1; MG/1
1 TABLET ORAL DAILY
Qty: 90 TABLET | Refills: 1 | Status: SHIPPED | OUTPATIENT
Start: 2021-10-26 | End: 2023-08-02

## 2021-10-30 RX ORDER — ATORVASTATIN CALCIUM 20 MG/1
TABLET, FILM COATED ORAL
Qty: 90 TABLET | Refills: 1 | Status: SHIPPED | OUTPATIENT
Start: 2021-10-30 | End: 2022-05-02

## 2021-10-30 RX ORDER — FOLIC ACID 1 MG/1
TABLET ORAL
Qty: 90 TABLET | Refills: 0 | OUTPATIENT
Start: 2021-10-30

## 2021-11-23 ENCOUNTER — TELEPHONE (OUTPATIENT)
Dept: INTERNAL MEDICINE | Facility: CLINIC | Age: 66
End: 2021-11-23
Payer: MEDICARE

## 2021-11-23 RX ORDER — AZITHROMYCIN 250 MG/1
TABLET, FILM COATED ORAL
Qty: 6 TABLET | Refills: 0 | Status: SHIPPED | OUTPATIENT
Start: 2021-11-23 | End: 2021-12-10 | Stop reason: CLARIF

## 2021-11-23 RX ORDER — BENZONATATE 100 MG/1
100 CAPSULE ORAL 3 TIMES DAILY PRN
Qty: 15 CAPSULE | Refills: 0 | Status: SHIPPED | OUTPATIENT
Start: 2021-11-23 | End: 2021-11-28

## 2021-12-06 ENCOUNTER — TELEPHONE (OUTPATIENT)
Dept: OTOLARYNGOLOGY | Facility: CLINIC | Age: 66
End: 2021-12-06
Payer: MEDICARE

## 2021-12-07 ENCOUNTER — OFFICE VISIT (OUTPATIENT)
Dept: OTOLARYNGOLOGY | Facility: CLINIC | Age: 66
End: 2021-12-07
Payer: MEDICARE

## 2021-12-07 VITALS
BODY MASS INDEX: 28.56 KG/M2 | WEIGHT: 176.94 LBS | TEMPERATURE: 98 F | DIASTOLIC BLOOD PRESSURE: 72 MMHG | HEART RATE: 69 BPM | SYSTOLIC BLOOD PRESSURE: 165 MMHG

## 2021-12-07 DIAGNOSIS — J34.2 NASAL SEPTAL DEVIATION: ICD-10-CM

## 2021-12-07 DIAGNOSIS — S02.2XXA CLOSED FRACTURE OF NASAL BONE, INITIAL ENCOUNTER: Primary | ICD-10-CM

## 2021-12-07 DIAGNOSIS — M95.0 NASAL DEFORMITY, ACQUIRED: ICD-10-CM

## 2021-12-07 DIAGNOSIS — S02.2XXA NASAL BONES, CLOSED FRACTURE: ICD-10-CM

## 2021-12-07 PROCEDURE — 99999 PR PBB SHADOW E&M-EST. PATIENT-LVL III: ICD-10-PCS | Mod: PBBFAC,HCNC,, | Performed by: SPECIALIST

## 2021-12-07 PROCEDURE — 99214 PR OFFICE/OUTPT VISIT, EST, LEVL IV, 30-39 MIN: ICD-10-PCS | Mod: HCNC,S$GLB,, | Performed by: SPECIALIST

## 2021-12-07 PROCEDURE — 99999 PR PBB SHADOW E&M-EST. PATIENT-LVL III: CPT | Mod: PBBFAC,HCNC,, | Performed by: SPECIALIST

## 2021-12-07 PROCEDURE — 99214 OFFICE O/P EST MOD 30 MIN: CPT | Mod: HCNC,S$GLB,, | Performed by: SPECIALIST

## 2021-12-07 RX ORDER — CEFAZOLIN SODIUM 2 G/50ML
2 SOLUTION INTRAVENOUS
Status: CANCELLED | OUTPATIENT
Start: 2021-12-07

## 2021-12-07 RX ORDER — LIDOCAINE HYDROCHLORIDE 10 MG/ML
1 INJECTION, SOLUTION EPIDURAL; INFILTRATION; INTRACAUDAL; PERINEURAL ONCE
Status: CANCELLED | OUTPATIENT
Start: 2021-12-07 | End: 2021-12-07

## 2021-12-07 RX ORDER — DEXAMETHASONE SODIUM PHOSPHATE 4 MG/ML
8 INJECTION, SOLUTION INTRA-ARTICULAR; INTRALESIONAL; INTRAMUSCULAR; INTRAVENOUS; SOFT TISSUE
Status: CANCELLED | OUTPATIENT
Start: 2021-12-07

## 2021-12-08 RX ORDER — TRAMADOL HYDROCHLORIDE 50 MG/1
50 TABLET ORAL EVERY 6 HOURS PRN
Qty: 30 TABLET | Refills: 0 | Status: SHIPPED | OUTPATIENT
Start: 2021-12-08 | End: 2022-02-13

## 2021-12-09 ENCOUNTER — TELEPHONE (OUTPATIENT)
Dept: OTOLARYNGOLOGY | Facility: CLINIC | Age: 66
End: 2021-12-09
Payer: MEDICARE

## 2021-12-10 ENCOUNTER — ANESTHESIA EVENT (OUTPATIENT)
Dept: SURGERY | Facility: OTHER | Age: 66
End: 2021-12-10
Payer: MEDICARE

## 2021-12-10 ENCOUNTER — HOSPITAL ENCOUNTER (OUTPATIENT)
Dept: PREADMISSION TESTING | Facility: OTHER | Age: 66
Discharge: HOME | End: 2021-12-10
Attending: SPECIALIST
Payer: MEDICARE

## 2021-12-10 VITALS
DIASTOLIC BLOOD PRESSURE: 67 MMHG | SYSTOLIC BLOOD PRESSURE: 147 MMHG | WEIGHT: 177 LBS | BODY MASS INDEX: 27.78 KG/M2 | OXYGEN SATURATION: 95 % | HEART RATE: 65 BPM | HEIGHT: 67 IN | TEMPERATURE: 98 F

## 2021-12-10 RX ORDER — SODIUM CHLORIDE, SODIUM LACTATE, POTASSIUM CHLORIDE, CALCIUM CHLORIDE 600; 310; 30; 20 MG/100ML; MG/100ML; MG/100ML; MG/100ML
INJECTION, SOLUTION INTRAVENOUS CONTINUOUS
Status: CANCELLED | OUTPATIENT
Start: 2021-12-10

## 2021-12-10 RX ORDER — LIDOCAINE HYDROCHLORIDE 10 MG/ML
0.5 INJECTION, SOLUTION EPIDURAL; INFILTRATION; INTRACAUDAL; PERINEURAL ONCE
Status: CANCELLED | OUTPATIENT
Start: 2021-12-10 | End: 2021-12-10

## 2021-12-13 ENCOUNTER — HOSPITAL ENCOUNTER (OUTPATIENT)
Facility: OTHER | Age: 66
Discharge: HOME | End: 2021-12-13
Attending: SPECIALIST | Admitting: SPECIALIST
Payer: MEDICARE

## 2021-12-13 ENCOUNTER — ANESTHESIA (OUTPATIENT)
Dept: SURGERY | Facility: OTHER | Age: 66
End: 2021-12-13
Payer: MEDICARE

## 2021-12-13 VITALS
DIASTOLIC BLOOD PRESSURE: 67 MMHG | RESPIRATION RATE: 16 BRPM | HEIGHT: 67 IN | WEIGHT: 177 LBS | TEMPERATURE: 98 F | SYSTOLIC BLOOD PRESSURE: 149 MMHG | BODY MASS INDEX: 27.78 KG/M2 | HEART RATE: 72 BPM | OXYGEN SATURATION: 99 %

## 2021-12-13 DIAGNOSIS — S02.2XXA NASAL BONES, CLOSED FRACTURE: ICD-10-CM

## 2021-12-13 DIAGNOSIS — S02.2XXS CLOSED FRACTURE OF NASAL BONE, SEQUELA: Primary | ICD-10-CM

## 2021-12-13 PROCEDURE — 71000033 HC RECOVERY, INTIAL HOUR: Mod: HCNC | Performed by: SPECIALIST

## 2021-12-13 PROCEDURE — 37000008 HC ANESTHESIA 1ST 15 MINUTES: Mod: HCNC | Performed by: SPECIALIST

## 2021-12-13 PROCEDURE — 36000704 HC OR TIME LEV I 1ST 15 MIN: Mod: HCNC | Performed by: SPECIALIST

## 2021-12-13 PROCEDURE — 71000015 HC POSTOP RECOV 1ST HR: Mod: HCNC | Performed by: SPECIALIST

## 2021-12-13 PROCEDURE — 36000705 HC OR TIME LEV I EA ADD 15 MIN: Mod: HCNC | Performed by: SPECIALIST

## 2021-12-13 PROCEDURE — 25000003 PHARM REV CODE 250: Mod: HCNC | Performed by: SPECIALIST

## 2021-12-13 PROCEDURE — 21320 CLSD TX NSL FX W/MNPJ&STABLJ: CPT | Mod: HCNC,,, | Performed by: SPECIALIST

## 2021-12-13 PROCEDURE — 71000016 HC POSTOP RECOV ADDL HR: Mod: HCNC | Performed by: SPECIALIST

## 2021-12-13 PROCEDURE — 37000009 HC ANESTHESIA EA ADD 15 MINS: Mod: HCNC | Performed by: SPECIALIST

## 2021-12-13 PROCEDURE — 25000003 PHARM REV CODE 250: Mod: HCNC | Performed by: NURSE ANESTHETIST, CERTIFIED REGISTERED

## 2021-12-13 PROCEDURE — 63600175 PHARM REV CODE 636 W HCPCS: Mod: HCNC | Performed by: SPECIALIST

## 2021-12-13 PROCEDURE — 25000003 PHARM REV CODE 250: Mod: HCNC | Performed by: ANESTHESIOLOGY

## 2021-12-13 PROCEDURE — 71000039 HC RECOVERY, EACH ADD'L HOUR: Mod: HCNC | Performed by: SPECIALIST

## 2021-12-13 PROCEDURE — 21320 PR TREATMENT, NASAL BONE FRACTURE, CLOSED W/MANIP, W/STABILIZ: ICD-10-PCS | Mod: HCNC,,, | Performed by: SPECIALIST

## 2021-12-13 PROCEDURE — 63600175 PHARM REV CODE 636 W HCPCS: Mod: HCNC | Performed by: NURSE ANESTHETIST, CERTIFIED REGISTERED

## 2021-12-13 RX ORDER — EPINEPHRINE 1 MG/ML
INJECTION, SOLUTION INTRACARDIAC; INTRAMUSCULAR; INTRAVENOUS; SUBCUTANEOUS
Status: DISCONTINUED | OUTPATIENT
Start: 2021-12-13 | End: 2021-12-13 | Stop reason: HOSPADM

## 2021-12-13 RX ORDER — SODIUM CHLORIDE, SODIUM LACTATE, POTASSIUM CHLORIDE, CALCIUM CHLORIDE 600; 310; 30; 20 MG/100ML; MG/100ML; MG/100ML; MG/100ML
INJECTION, SOLUTION INTRAVENOUS CONTINUOUS
Status: DISCONTINUED | OUTPATIENT
Start: 2021-12-13 | End: 2021-12-13 | Stop reason: HOSPADM

## 2021-12-13 RX ORDER — BACITRACIN ZINC 500 UNIT/G
OINTMENT (GRAM) TOPICAL
Status: DISCONTINUED | OUTPATIENT
Start: 2021-12-13 | End: 2021-12-13 | Stop reason: HOSPADM

## 2021-12-13 RX ORDER — ONDANSETRON 8 MG/1
8 TABLET, ORALLY DISINTEGRATING ORAL EVERY 6 HOURS PRN
Qty: 10 TABLET | Refills: 1 | Status: SHIPPED | OUTPATIENT
Start: 2021-12-13 | End: 2022-02-11

## 2021-12-13 RX ORDER — ONDANSETRON 8 MG/1
8 TABLET, ORALLY DISINTEGRATING ORAL EVERY 6 HOURS PRN
Qty: 10 TABLET | Refills: 1 | Status: SHIPPED | OUTPATIENT
Start: 2021-12-13 | End: 2021-12-13 | Stop reason: SDUPTHER

## 2021-12-13 RX ORDER — DEXAMETHASONE SODIUM PHOSPHATE 4 MG/ML
INJECTION, SOLUTION INTRA-ARTICULAR; INTRALESIONAL; INTRAMUSCULAR; INTRAVENOUS; SOFT TISSUE
Status: DISCONTINUED | OUTPATIENT
Start: 2021-12-13 | End: 2021-12-13

## 2021-12-13 RX ORDER — ONDANSETRON 2 MG/ML
4 INJECTION INTRAMUSCULAR; INTRAVENOUS DAILY PRN
Status: DISCONTINUED | OUTPATIENT
Start: 2021-12-13 | End: 2021-12-13 | Stop reason: HOSPADM

## 2021-12-13 RX ORDER — HYDROCODONE BITARTRATE AND ACETAMINOPHEN 10; 325 MG/1; MG/1
1 TABLET ORAL EVERY 4 HOURS PRN
Status: CANCELLED | OUTPATIENT
Start: 2021-12-13

## 2021-12-13 RX ORDER — ONDANSETRON 8 MG/1
8 TABLET, ORALLY DISINTEGRATING ORAL EVERY 6 HOURS PRN
Status: CANCELLED | OUTPATIENT
Start: 2021-12-13

## 2021-12-13 RX ORDER — PROPOFOL 10 MG/ML
VIAL (ML) INTRAVENOUS
Status: DISCONTINUED | OUTPATIENT
Start: 2021-12-13 | End: 2021-12-13

## 2021-12-13 RX ORDER — SODIUM CHLORIDE, SODIUM LACTATE, POTASSIUM CHLORIDE, CALCIUM CHLORIDE 600; 310; 30; 20 MG/100ML; MG/100ML; MG/100ML; MG/100ML
INJECTION, SOLUTION INTRAVENOUS CONTINUOUS
Status: CANCELLED | OUTPATIENT
Start: 2021-12-13

## 2021-12-13 RX ORDER — LIDOCAINE HYDROCHLORIDE 10 MG/ML
1 INJECTION, SOLUTION EPIDURAL; INFILTRATION; INTRACAUDAL; PERINEURAL ONCE
Status: DISCONTINUED | OUTPATIENT
Start: 2021-12-13 | End: 2021-12-13

## 2021-12-13 RX ORDER — HYDROCODONE BITARTRATE AND ACETAMINOPHEN 5; 325 MG/1; MG/1
TABLET ORAL
Qty: 28 TABLET | Refills: 0 | Status: SHIPPED | OUTPATIENT
Start: 2021-12-13 | End: 2022-02-11

## 2021-12-13 RX ORDER — LABETALOL HYDROCHLORIDE 5 MG/ML
INJECTION, SOLUTION INTRAVENOUS
Status: DISCONTINUED | OUTPATIENT
Start: 2021-12-13 | End: 2021-12-13

## 2021-12-13 RX ORDER — CEPHALEXIN 500 MG/1
500 CAPSULE ORAL 4 TIMES DAILY
Qty: 40 CAPSULE | Refills: 0 | Status: SHIPPED | OUTPATIENT
Start: 2021-12-13 | End: 2021-12-13 | Stop reason: SDUPTHER

## 2021-12-13 RX ORDER — HYDROMORPHONE HYDROCHLORIDE 2 MG/ML
0.4 INJECTION, SOLUTION INTRAMUSCULAR; INTRAVENOUS; SUBCUTANEOUS EVERY 5 MIN PRN
Status: DISCONTINUED | OUTPATIENT
Start: 2021-12-13 | End: 2021-12-13 | Stop reason: HOSPADM

## 2021-12-13 RX ORDER — HYDROCODONE BITARTRATE AND ACETAMINOPHEN 5; 325 MG/1; MG/1
TABLET ORAL
Qty: 28 TABLET | Refills: 0 | Status: SHIPPED | OUTPATIENT
Start: 2021-12-13 | End: 2021-12-13 | Stop reason: SDUPTHER

## 2021-12-13 RX ORDER — CEFAZOLIN SODIUM 2 G/50ML
2 SOLUTION INTRAVENOUS
Status: CANCELLED | OUTPATIENT
Start: 2021-12-13

## 2021-12-13 RX ORDER — SODIUM CHLORIDE 0.9 % (FLUSH) 0.9 %
3 SYRINGE (ML) INJECTION
Status: DISCONTINUED | OUTPATIENT
Start: 2021-12-13 | End: 2021-12-13 | Stop reason: HOSPADM

## 2021-12-13 RX ORDER — CEFAZOLIN SODIUM 1 G/3ML
2 INJECTION, POWDER, FOR SOLUTION INTRAMUSCULAR; INTRAVENOUS
Status: COMPLETED | OUTPATIENT
Start: 2021-12-13 | End: 2021-12-13

## 2021-12-13 RX ORDER — HYDROCODONE BITARTRATE AND ACETAMINOPHEN 5; 325 MG/1; MG/1
1 TABLET ORAL EVERY 4 HOURS PRN
Status: CANCELLED | OUTPATIENT
Start: 2021-12-13

## 2021-12-13 RX ORDER — LIDOCAINE HYDROCHLORIDE 10 MG/ML
1 INJECTION, SOLUTION EPIDURAL; INFILTRATION; INTRACAUDAL; PERINEURAL ONCE
Status: CANCELLED | OUTPATIENT
Start: 2021-12-13 | End: 2021-12-13

## 2021-12-13 RX ORDER — LIDOCAINE HYDROCHLORIDE 20 MG/ML
INJECTION INTRAVENOUS
Status: DISCONTINUED | OUTPATIENT
Start: 2021-12-13 | End: 2021-12-13

## 2021-12-13 RX ORDER — FENTANYL CITRATE 50 UG/ML
INJECTION, SOLUTION INTRAMUSCULAR; INTRAVENOUS
Status: DISCONTINUED | OUTPATIENT
Start: 2021-12-13 | End: 2021-12-13

## 2021-12-13 RX ORDER — LIDOCAINE HYDROCHLORIDE 10 MG/ML
0.5 INJECTION, SOLUTION EPIDURAL; INFILTRATION; INTRACAUDAL; PERINEURAL ONCE
Status: DISCONTINUED | OUTPATIENT
Start: 2021-12-13 | End: 2021-12-13 | Stop reason: HOSPADM

## 2021-12-13 RX ORDER — ROCURONIUM BROMIDE 10 MG/ML
INJECTION, SOLUTION INTRAVENOUS
Status: DISCONTINUED | OUTPATIENT
Start: 2021-12-13 | End: 2021-12-13

## 2021-12-13 RX ORDER — CEPHALEXIN 500 MG/1
500 CAPSULE ORAL 4 TIMES DAILY
Qty: 40 CAPSULE | Refills: 0 | Status: SHIPPED | OUTPATIENT
Start: 2021-12-13 | End: 2022-02-11 | Stop reason: ALTCHOICE

## 2021-12-13 RX ORDER — MEPERIDINE HYDROCHLORIDE 25 MG/ML
12.5 INJECTION INTRAMUSCULAR; INTRAVENOUS; SUBCUTANEOUS ONCE AS NEEDED
Status: DISCONTINUED | OUTPATIENT
Start: 2021-12-13 | End: 2021-12-13 | Stop reason: HOSPADM

## 2021-12-13 RX ORDER — DEXAMETHASONE SODIUM PHOSPHATE 4 MG/ML
8 INJECTION, SOLUTION INTRA-ARTICULAR; INTRALESIONAL; INTRAMUSCULAR; INTRAVENOUS; SOFT TISSUE
Status: CANCELLED | OUTPATIENT
Start: 2021-12-13

## 2021-12-13 RX ORDER — LIDOCAINE HYDROCHLORIDE AND EPINEPHRINE 10; 10 MG/ML; UG/ML
INJECTION, SOLUTION INFILTRATION; PERINEURAL
Status: DISCONTINUED | OUTPATIENT
Start: 2021-12-13 | End: 2021-12-13 | Stop reason: HOSPADM

## 2021-12-13 RX ORDER — OXYCODONE HYDROCHLORIDE 5 MG/1
5 TABLET ORAL
Status: DISCONTINUED | OUTPATIENT
Start: 2021-12-13 | End: 2021-12-13 | Stop reason: HOSPADM

## 2021-12-13 RX ORDER — ONDANSETRON 2 MG/ML
INJECTION INTRAMUSCULAR; INTRAVENOUS
Status: DISCONTINUED | OUTPATIENT
Start: 2021-12-13 | End: 2021-12-13

## 2021-12-13 RX ADMIN — OXYCODONE 5 MG: 5 TABLET ORAL at 01:12

## 2021-12-13 RX ADMIN — CARBOXYMETHYLCELLULOSE SODIUM 2 DROP: 2.5 SOLUTION/ DROPS OPHTHALMIC at 12:12

## 2021-12-13 RX ADMIN — PROPOFOL 130 MG: 10 INJECTION, EMULSION INTRAVENOUS at 12:12

## 2021-12-13 RX ADMIN — ROCURONIUM BROMIDE 40 MG: 10 INJECTION, SOLUTION INTRAVENOUS at 12:12

## 2021-12-13 RX ADMIN — FENTANYL CITRATE 100 MCG: 50 INJECTION, SOLUTION INTRAMUSCULAR; INTRAVENOUS at 12:12

## 2021-12-13 RX ADMIN — SUGAMMADEX 200 MG: 100 INJECTION, SOLUTION INTRAVENOUS at 12:12

## 2021-12-13 RX ADMIN — DEXAMETHASONE SODIUM PHOSPHATE 8 MG: 4 INJECTION, SOLUTION INTRAMUSCULAR; INTRAVENOUS at 12:12

## 2021-12-13 RX ADMIN — LABETALOL HYDROCHLORIDE 15 MG: 5 INJECTION INTRAVENOUS at 12:12

## 2021-12-13 RX ADMIN — CEFAZOLIN 2 G: 330 INJECTION, POWDER, FOR SOLUTION INTRAMUSCULAR; INTRAVENOUS at 12:12

## 2021-12-13 RX ADMIN — ONDANSETRON HYDROCHLORIDE 4 MG: 2 INJECTION INTRAMUSCULAR; INTRAVENOUS at 12:12

## 2021-12-13 RX ADMIN — LIDOCAINE HYDROCHLORIDE 100 MG: 20 INJECTION, SOLUTION INTRAVENOUS at 12:12

## 2021-12-17 ENCOUNTER — TELEPHONE (OUTPATIENT)
Dept: OTOLARYNGOLOGY | Facility: CLINIC | Age: 66
End: 2021-12-17
Payer: MEDICARE

## 2021-12-20 ENCOUNTER — OFFICE VISIT (OUTPATIENT)
Dept: OTOLARYNGOLOGY | Facility: CLINIC | Age: 66
End: 2021-12-20
Payer: MEDICARE

## 2021-12-20 VITALS
DIASTOLIC BLOOD PRESSURE: 77 MMHG | HEART RATE: 70 BPM | TEMPERATURE: 98 F | SYSTOLIC BLOOD PRESSURE: 175 MMHG | BODY MASS INDEX: 27.24 KG/M2 | WEIGHT: 173.94 LBS

## 2021-12-20 DIAGNOSIS — J34.2 NASAL SEPTAL DEVIATION: ICD-10-CM

## 2021-12-20 DIAGNOSIS — S02.2XXS CLOSED FRACTURE OF NASAL BONE, SEQUELA: ICD-10-CM

## 2021-12-20 DIAGNOSIS — S02.2XXA CLOSED FRACTURE OF NASAL BONE, INITIAL ENCOUNTER: Primary | ICD-10-CM

## 2021-12-20 DIAGNOSIS — M95.0 NASAL DEFORMITY, ACQUIRED: ICD-10-CM

## 2021-12-20 PROCEDURE — 99024 PR POST-OP FOLLOW-UP VISIT: ICD-10-PCS | Mod: HCNC,S$GLB,, | Performed by: SPECIALIST

## 2021-12-20 PROCEDURE — 99024 POSTOP FOLLOW-UP VISIT: CPT | Mod: HCNC,S$GLB,, | Performed by: SPECIALIST

## 2021-12-20 PROCEDURE — 99999 PR PBB SHADOW E&M-EST. PATIENT-LVL III: CPT | Mod: PBBFAC,HCNC,, | Performed by: SPECIALIST

## 2021-12-20 PROCEDURE — 99999 PR PBB SHADOW E&M-EST. PATIENT-LVL III: ICD-10-PCS | Mod: PBBFAC,HCNC,, | Performed by: SPECIALIST

## 2021-12-29 RX ORDER — METOPROLOL SUCCINATE 100 MG/1
TABLET, EXTENDED RELEASE ORAL
Qty: 90 TABLET | Refills: 1 | Status: SHIPPED | OUTPATIENT
Start: 2021-12-29 | End: 2022-06-27

## 2022-02-11 ENCOUNTER — OFFICE VISIT (OUTPATIENT)
Dept: OTOLARYNGOLOGY | Facility: CLINIC | Age: 67
End: 2022-02-11
Payer: MEDICARE

## 2022-02-11 DIAGNOSIS — J34.2 NASAL SEPTAL DEVIATION: ICD-10-CM

## 2022-02-11 DIAGNOSIS — M95.0 NASAL DEFORMITY, ACQUIRED: ICD-10-CM

## 2022-02-11 DIAGNOSIS — J30.9 ALLERGIC RHINITIS, UNSPECIFIED SEASONALITY, UNSPECIFIED TRIGGER: Primary | ICD-10-CM

## 2022-02-11 DIAGNOSIS — H61.23 BILATERAL IMPACTED CERUMEN: ICD-10-CM

## 2022-02-11 PROCEDURE — 99999 PR PBB SHADOW E&M-EST. PATIENT-LVL III: ICD-10-PCS | Mod: PBBFAC,HCNC,, | Performed by: SPECIALIST

## 2022-02-11 PROCEDURE — 1159F MED LIST DOCD IN RCRD: CPT | Mod: HCNC,CPTII,S$GLB, | Performed by: SPECIALIST

## 2022-02-11 PROCEDURE — 69210 REMOVE IMPACTED EAR WAX UNI: CPT | Mod: HCNC,S$GLB,, | Performed by: SPECIALIST

## 2022-02-11 PROCEDURE — 99999 PR PBB SHADOW E&M-EST. PATIENT-LVL III: CPT | Mod: PBBFAC,HCNC,, | Performed by: SPECIALIST

## 2022-02-11 PROCEDURE — 1160F RVW MEDS BY RX/DR IN RCRD: CPT | Mod: HCNC,CPTII,S$GLB, | Performed by: SPECIALIST

## 2022-02-11 PROCEDURE — 99213 OFFICE O/P EST LOW 20 MIN: CPT | Mod: 25,HCNC,S$GLB, | Performed by: SPECIALIST

## 2022-02-11 PROCEDURE — 1159F PR MEDICATION LIST DOCUMENTED IN MEDICAL RECORD: ICD-10-PCS | Mod: HCNC,CPTII,S$GLB, | Performed by: SPECIALIST

## 2022-02-11 PROCEDURE — 69210 EAR CERUMEN REMOVAL: ICD-10-PCS | Mod: HCNC,S$GLB,, | Performed by: SPECIALIST

## 2022-02-11 PROCEDURE — 99213 PR OFFICE/OUTPT VISIT, EST, LEVL III, 20-29 MIN: ICD-10-PCS | Mod: 25,HCNC,S$GLB, | Performed by: SPECIALIST

## 2022-02-11 PROCEDURE — 1160F PR REVIEW ALL MEDS BY PRESCRIBER/CLIN PHARMACIST DOCUMENTED: ICD-10-PCS | Mod: HCNC,CPTII,S$GLB, | Performed by: SPECIALIST

## 2022-02-11 RX ORDER — AZELASTINE 1 MG/ML
SPRAY, METERED NASAL
Qty: 90 ML | Refills: 3 | OUTPATIENT
Start: 2022-02-11 | End: 2023-08-02

## 2022-02-11 RX ORDER — AZELASTINE 1 MG/ML
SPRAY, METERED NASAL
COMMUNITY
End: 2022-02-11 | Stop reason: SDUPTHER

## 2022-02-11 RX ORDER — TRIAMCINOLONE ACETONIDE 1 MG/G
CREAM TOPICAL
COMMUNITY
Start: 2021-11-09

## 2022-02-11 RX ORDER — GABAPENTIN 100 MG/1
200 CAPSULE ORAL 2 TIMES DAILY
COMMUNITY
Start: 2021-12-20 | End: 2023-08-02

## 2022-02-11 RX ORDER — INFLUENZA VACCINE, ADJUVANTED 15; 15; 15; 15 UG/.5ML; UG/.5ML; UG/.5ML; UG/.5ML
INJECTION, SUSPENSION INTRAMUSCULAR
COMMUNITY
Start: 2021-09-27 | End: 2023-08-02

## 2022-02-11 RX ORDER — FLUTICASONE PROPIONATE 50 MCG
SPRAY, SUSPENSION (ML) NASAL
Qty: 54.6 ML | Refills: 3 | Status: SHIPPED | OUTPATIENT
Start: 2022-02-11 | End: 2023-08-02

## 2022-02-11 NOTE — PROGRESS NOTES
Subjective:       Patient ID: Elly Harris is a 66 y.o. female.    Chief Complaint: No chief complaint on file.    The patient is now 2 months post close reduction of nasal fracture for the 2nd time.  She is not having significant issues breathing through her nose.  She is having some nasal congestion and rhinorrhea and clear nasal discharge.  She needs refills on her allergy medications.    The since her last visit she had another episode of falling at home is now using a with a walker for ambulation.        Review of Systems     Constitutional: Positive for fatigue.  Negative for appetite change, chills, fever and unexpected weight loss.      HENT: Positive for ear pain, hearing loss, postnasal drip, runny nose, sinus pressure, sore throat and stuffy nose.  Negative for ear discharge, ear infection, facial swelling, mouth sores, nosebleeds, ringing in the ears, sinus infection, tonsil infection, dental problems, trouble swallowing and voice change.      Eyes:  Positive for eye itching and photophobia. Negative for change in eyesight and eye drainage.     Respiratory:  Positive for cough, shortness of breath and snoring. Negative for sleep apnea and wheezing.      Cardiovascular:  Negative for chest pain, foot swelling, irregular heartbeat and swollen veins.     Gastrointestinal:  Positive for acid reflux and heartburn. Negative for abdominal pain, constipation, diarrhea and vomiting.     Genitourinary: Negative for difficulty urinating, sexual problems and frequent urination.     Musc: Positive for aching muscles, back pain and neck pain. Negative for aching joints.     Skin: Negative for rash.     Allergy: Positive for seasonal allergies. Negative for food allergies.     Endocrine: Positive for heat intolerance. Negative for cold intolerance.      Neurological: Positive for dizziness, headaches and light-headedness. Negative for seizures and tremors.      Hematologic: Positive for bruises/bleeds easily.      Psychiatric: Positive for decreased concentration, depression and nervous/anxious. Negative for sleep disturbance.                Objective:      Physical Exam  Vitals and nursing note reviewed.   Constitutional:       General: She is awake.      Appearance: Normal appearance. She is well-developed, well-groomed and normal weight.   HENT:      Head: Normocephalic.      Jaw: There is normal jaw occlusion.      Salivary Glands: Right salivary gland is not diffusely enlarged. Left salivary gland is not diffusely enlarged.      Right Ear: Ear canal and external ear normal. Decreased hearing noted. Right ear swelling: Flaky dry skin with mild erythema along the anti helical rim near the superior crew helix. There is impacted cerumen. Tympanic membrane is retracted.      Left Ear: Ear canal and external ear normal. Decreased hearing noted. There is impacted cerumen. Tympanic membrane is retracted.      Ears:        Nose: Nasal deformity ( mild external deviation to the left), septal deviation ( ), mucosal edema (cyanotic, boggy inferior turbinates bilaterally) and rhinorrhea (clear mucus bilaterally) present. Rhinorrhea is clear.      Right Turbinates: Enlarged and pale.      Left Turbinates: Enlarged and pale.      Mouth/Throat:      Lips: No lesions.      Mouth: No oral lesions.      Dentition: No gum lesions.      Tongue: No lesions.      Palate: No mass and lesions.      Pharynx: Oropharynx is clear. Uvula midline.   Eyes:      General: Lids are normal.         Right eye: No discharge.         Left eye: No discharge.      Conjunctiva/sclera:      Right eye: Right conjunctiva is injected. No exudate.     Left eye: Left conjunctiva is injected. No exudate.     Pupils: Pupils are equal, round, and reactive to light.   Neck:      Thyroid: No thyroid mass or thyromegaly.      Trachea: Trachea normal. No tracheal deviation.   Cardiovascular:      Rate and Rhythm: Normal rate and regular rhythm.      Pulses: Normal  pulses.      Heart sounds: Normal heart sounds.   Pulmonary:      Effort: Pulmonary effort is normal.      Breath sounds: Normal breath sounds. No stridor. No decreased breath sounds, wheezing, rhonchi or rales.   Abdominal:      General: Bowel sounds are normal.      Palpations: Abdomen is soft.      Tenderness: There is no abdominal tenderness.   Musculoskeletal:         General: Normal range of motion.      Cervical back: Normal range of motion. No muscular tenderness.   Lymphadenopathy:      Head:      Right side of head: No submental, submandibular, preauricular, posterior auricular or occipital adenopathy.      Left side of head: No submental, submandibular, preauricular, posterior auricular or occipital adenopathy.      Cervical: No cervical adenopathy.   Skin:     General: Skin is warm and dry.      Findings: No petechiae or rash.      Nails: There is no clubbing.   Neurological:      Mental Status: She is alert and oriented to person, place, and time.      Cranial Nerves: No cranial nerve deficit.      Sensory: No sensory deficit.      Gait: Gait abnormal ( uses a walker for ambulation).   Psychiatric:         Speech: Speech normal.         Behavior: Behavior normal. Behavior is cooperative.         Thought Content: Thought content normal.         Judgment: Judgment normal.         Procedure-cerumen impactions removed from both ears using wire loop.  The patient tolerated procedure well was discharged post procedure.    Assessment:       1. Allergic rhinitis, unspecified seasonality, unspecified trigger    2. Bilateral impacted cerumen    3. Nasal deformity, acquired    4. Nasal septal deviation        Plan:       I will  refill the patient's azelastine and fluticasone sprays.  She will continue using them twice daily.  I will recheck her in 4 weeks.

## 2022-02-11 NOTE — PROCEDURES
Ear Cerumen Removal    Date/Time: 2/11/2022 3:40 PM  Performed by: JV Connelly MD  Authorized by: JV Connelly MD     Consent Done?:  Yes (Verbal)    Local anesthetic:  None  Location details:  Both ears  Procedure type comment:  WIRE LOOP  Cerumen  Removal Results:  Cerumen completely removed  Patient tolerance:  Patient tolerated the procedure well with no immediate complications

## 2022-03-18 ENCOUNTER — PATIENT MESSAGE (OUTPATIENT)
Dept: ADMINISTRATIVE | Facility: OTHER | Age: 67
End: 2022-03-18
Payer: MEDICARE

## 2022-03-21 PROBLEM — Z91.81 HISTORY OF FALL: Status: ACTIVE | Noted: 2022-03-21

## 2022-04-21 NOTE — TELEPHONE ENCOUNTER
Please see the attached refill request.  
I personally performed the service described in the documentation recorded by the scribe in my presence, and it accurately and completely records my words and actions.

## 2022-05-02 RX ORDER — ATORVASTATIN CALCIUM 20 MG/1
TABLET, FILM COATED ORAL
Qty: 90 TABLET | Refills: 1 | Status: SHIPPED | OUTPATIENT
Start: 2022-05-02 | End: 2022-10-26

## 2022-05-02 NOTE — TELEPHONE ENCOUNTER
Care Due:                  Date            Visit Type   Department     Provider  --------------------------------------------------------------------------------                                EP -                              PRIMARY      [facility] PRIMARY  Last Visit: 09-      CARE ([facility])   HOANG Preston  Next Visit: None Scheduled  None         None Found                                                            Last  Test          Frequency    Reason                     Performed    Due Date  --------------------------------------------------------------------------------    CMP.........  12 months..  atorvastatin.............  Not Found    Overdue    Lipid Panel.  12 months..  atorvastatin.............  Not Found    Overdue    Powered by [employer]. Reference number: [id number].   5/02/2022 4:28:00 AM CDT

## 2022-05-02 NOTE — TELEPHONE ENCOUNTER
Refill Routing Note   Medication(s) are not appropriate for processing by [facility] for the following reason(s):      - Required vitals are outdated  - Patient has been seen in the ED/Hospital since the last PCP visit    [facility] action(s):  Route Medication-related problems identified: Requires labs        Medication reconciliation completed: No     Appointments  past 12m or future 3m with PCP    Date Provider   Last Visit   9/16/2021 Srikanth Preston MD   Next Visit   Visit date not found Srikanth Preston MD   ED visits in past 90 days: 0        Note composed:1:25 PM 05/02/2022

## 2022-07-12 ENCOUNTER — NURSE TRIAGE (OUTPATIENT)
Dept: ADMINISTRATIVE | Facility: CLINIC | Age: 67
End: 2022-07-12
Payer: MEDICARE

## 2022-07-12 NOTE — TELEPHONE ENCOUNTER
Reason for Disposition   Systolic BP >= 160 OR Diastolic >= 100, and any cardiac or neurologic symptoms (e.g., chest pain, difficulty breathing, unsteady gait, blurred vision)    Additional Information   Negative: Symptom is main concern (e.g., headache, chest pain)   Negative: Low blood pressure is main concern   Negative: Sounds like a life-threatening emergency to the triager   Negative: Pregnant 20 or more weeks or postpartum (< 6 weeks after delivery) with new hand or face swelling   Negative: Pregnant 20 or more weeks or postpartum with Systolic BP >= 140 OR Diastolic >= 90    Protocols used: BLOOD PRESSURE - HIGH-A-OH    Pt stated 5 or 6 days ago she went to have a IESHA for back pain. Stated her blood pressure was 178/89. Stated she takes her metoprolol as prescribed. Pt stated she has blurry vision in her right eye and it appears she is looking out of a dirty window.     Stated she went to Dr. Mukherjee (her ophthalmologist) and he told her she has a hemorrhage in her eye and she needs to be on a better medication to control her blood pressure. Stated he told her the bleeding in the eye damaged her the optic nerve.     Pt requesting Dr. Preston to call in a new blood pressure medication. Per triage protocol advised to go to the ED for evaluation. Pt verbalized understanding.

## 2022-07-13 ENCOUNTER — TELEPHONE (OUTPATIENT)
Dept: INTERNAL MEDICINE | Facility: CLINIC | Age: 67
End: 2022-07-13
Payer: MEDICARE

## 2022-07-13 NOTE — TELEPHONE ENCOUNTER
----- Message from Monet Akbar sent at 7/13/2022 11:43 AM CDT -----  Contact: [phone]  Pt states she went to the ED yesterday she states she was told to go by the on call nurse she states the pathologist seems to think she had a stroke the ed stated they don't think she did she states her blood pressure was high and she states it has gone down and she is needing to speak to the dr in regards to what all they told her and what she needs to do please give return call she also states she has lost sight in her right eye

## 2022-08-31 DIAGNOSIS — Z78.0 MENOPAUSE: ICD-10-CM

## 2023-01-03 ENCOUNTER — TELEPHONE (OUTPATIENT)
Dept: OTOLARYNGOLOGY | Facility: CLINIC | Age: 68
End: 2023-01-03
Payer: MEDICARE

## 2023-01-03 NOTE — TELEPHONE ENCOUNTER
Returned pt call. Pt stated she was able to get medical attention elsewhere.       ----- Message from Jaelyn Wetzel sent at 1/3/2023  9:31 AM CST -----  Regarding: fractured nose  Name of Who is Calling:  INGA JUAREZ [[id number]]        What is the request in detail:  Pt fell and broke her nose. She would like to be seen asap. She normally sees Dr Connelly. No appts came up for me to schedule her. Please c/b to assist         Can the clinic reply by [facility]:          What Number to Call Back if not in [facility]:[phone]

## 2023-01-18 ENCOUNTER — PATIENT MESSAGE (OUTPATIENT)
Dept: INTERNAL MEDICINE | Facility: CLINIC | Age: 68
End: 2023-01-18
Payer: MEDICARE

## 2023-01-23 NOTE — TELEPHONE ENCOUNTER
No new care gaps identified.  [facility] Embedded Care Gaps. Reference number: [id number]. 1/23/2023   4:27:19 AM CST

## 2023-02-02 NOTE — TELEPHONE ENCOUNTER
----- Message from Debbie Malloy sent at 2/2/2023  3:31 PM CST -----  Contact: Pharmacy @ [phone]  Requesting an RX refill or new RX.  Is this a refill or new RX:   RX name and strength atorvastatin (LIPITOR) 20 MG tablet      Is this a 30 day or 90 day RX: 90  Pharmacy name and phone #[facility] [address]  The doctors have asked that we provide their patients with the following 2 reminders -- prescription refills can take up to 72 hours, and a friendly reminder that in the future you can use your [facility] account to request refills:

## 2023-02-03 NOTE — TELEPHONE ENCOUNTER
No new care gaps identified.  [facility] Embedded Care Gaps. Reference number: [id number]. 2/03/2023   9:21:56 AM CST

## 2023-02-04 RX ORDER — ATORVASTATIN CALCIUM 20 MG/1
20 TABLET, FILM COATED ORAL DAILY
Qty: 90 TABLET | Refills: 0 | Status: SHIPPED | OUTPATIENT
Start: 2023-02-04 | End: 2023-04-28

## 2023-02-07 DIAGNOSIS — Z00.00 ENCOUNTER FOR MEDICARE ANNUAL WELLNESS EXAM: ICD-10-CM

## 2023-02-09 DIAGNOSIS — Z00.00 ENCOUNTER FOR MEDICARE ANNUAL WELLNESS EXAM: ICD-10-CM

## 2023-03-29 RX ORDER — METOPROLOL SUCCINATE 100 MG/1
TABLET, EXTENDED RELEASE ORAL
Qty: 90 TABLET | Refills: 0 | Status: SHIPPED | OUTPATIENT
Start: 2023-03-29 | End: 2023-06-27

## 2023-03-29 RX ORDER — ATORVASTATIN CALCIUM 20 MG/1
TABLET, FILM COATED ORAL
Qty: 90 TABLET | Refills: 0 | OUTPATIENT
Start: 2023-03-29

## 2023-04-03 ENCOUNTER — TELEPHONE (OUTPATIENT)
Dept: OTOLARYNGOLOGY | Facility: CLINIC | Age: 68
End: 2023-04-03
Payer: MEDICARE

## 2023-04-03 NOTE — TELEPHONE ENCOUNTER
----- Message from Alondra Mooney sent at 4/3/2023  4:36 PM CDT -----  Regarding: PT ADVICE  Contact: PT  Pt called regarding scheduling an appt. Next available is in 6/6/23 but pt declined. Pt would like to be seen sooner due to a fractured nose.    Please advise. Pt can be reached at [phone].  Appointment schedule.

## 2023-04-03 NOTE — TELEPHONE ENCOUNTER
----- Message from Ifeoma De Oliveira sent at 4/3/2023 11:29 AM CDT -----  Contact: pt @ [phone] or [phone]  Elly Harris calling regarding Appointment Access  (message) for #pt is callig to get appt for nasal fracture, asking for call back   Attempted to call patient today about getting an appointment with Dr. Connelly.

## 2023-04-28 RX ORDER — ATORVASTATIN CALCIUM 20 MG/1
TABLET, FILM COATED ORAL
Qty: 90 TABLET | Refills: 0 | Status: ON HOLD | OUTPATIENT
Start: 2023-04-28 | End: 2023-08-03

## 2023-04-28 NOTE — TELEPHONE ENCOUNTER
No care due was identified.  [facility] Embedded Care Due Messages. Reference number: [id number].   4/28/2023 4:28:40 AM CDT

## 2023-05-08 ENCOUNTER — PATIENT MESSAGE (OUTPATIENT)
Dept: ADMINISTRATIVE | Facility: OTHER | Age: 68
End: 2023-05-08
Payer: MEDICARE

## 2023-06-27 RX ORDER — METOPROLOL SUCCINATE 100 MG/1
TABLET, EXTENDED RELEASE ORAL
Qty: 90 TABLET | Refills: 0 | Status: ON HOLD | OUTPATIENT
Start: 2023-06-27 | End: 2023-09-25 | Stop reason: SDUPTHER

## 2023-06-27 NOTE — TELEPHONE ENCOUNTER
Care Due:                  Date            Visit Type   Department     Provider  --------------------------------------------------------------------------------                                EP -                              PRIMARY      [facility] PRIMARY  Last Visit: 09-      CARE ([facility])   HOANG Preston  Next Visit: None Scheduled  None         None Found                                                            Last  Test          Frequency    Reason                     Performed    Due Date  --------------------------------------------------------------------------------    Office Visit  12 months..  atorvastatin,              09- 09-                             losartan-hydrochlorothiaz                             parvin......................    CMP.........  12 months..  atorvastatin,              Not Found    Overdue                             losartan-hydrochlorothiaz                             parvin......................    Lipid Panel.  12 months..  atorvastatin.............  Not Found    Overdue    [employer] Embedded Care Due Messages. Reference number: [id number].   6/27/2023 4:19:37 AM CDT

## 2023-07-25 ENCOUNTER — PES CALL (OUTPATIENT)
Dept: ADMINISTRATIVE | Facility: CLINIC | Age: 68
End: 2023-07-25
Payer: MEDICARE

## 2023-07-31 ENCOUNTER — TELEPHONE (OUTPATIENT)
Dept: INTERNAL MEDICINE | Facility: CLINIC | Age: 68
End: 2023-07-31
Payer: MEDICARE

## 2023-07-31 DIAGNOSIS — Z12.31 ENCOUNTER FOR SCREENING MAMMOGRAM FOR BREAST CANCER: Primary | ICD-10-CM

## 2023-07-31 NOTE — TELEPHONE ENCOUNTER
----- Message from Debbie Malloy sent at 7/31/2023  2:16 PM CDT -----  Contact: [phone]  Caller is requesting to schedule their annual screening mammogram appointment. Order is not listed in Epic.  Please enter order and contact patient to schedule.  Would the patient like a call back, or a response through their [facility] portal?:   call

## 2023-08-01 ENCOUNTER — HOSPITAL ENCOUNTER (OUTPATIENT)
Facility: HOSPITAL | Age: 68
Discharge: HOME | End: 2023-08-07
Attending: EMERGENCY MEDICINE | Admitting: STUDENT IN AN ORGANIZED HEALTH CARE EDUCATION/TRAINING PROGRAM
Payer: MEDICARE

## 2023-08-01 DIAGNOSIS — R06.02 SOB (SHORTNESS OF BREATH): ICD-10-CM

## 2023-08-01 DIAGNOSIS — R44.0 AUDITORY HALLUCINATIONS: ICD-10-CM

## 2023-08-01 DIAGNOSIS — S92.341A CLOSED DISPLACED FRACTURE OF FOURTH METATARSAL BONE OF RIGHT FOOT, INITIAL ENCOUNTER: ICD-10-CM

## 2023-08-01 DIAGNOSIS — D64.9 ANEMIA, UNSPECIFIED TYPE: ICD-10-CM

## 2023-08-01 DIAGNOSIS — I27.20 PULMONARY HYPERTENSION: ICD-10-CM

## 2023-08-01 DIAGNOSIS — R44.1 VISUAL HALLUCINATIONS: Primary | ICD-10-CM

## 2023-08-01 DIAGNOSIS — R53.83 FATIGUE: ICD-10-CM

## 2023-08-01 DIAGNOSIS — R07.9 CHEST PAIN: ICD-10-CM

## 2023-08-01 DIAGNOSIS — I10 HYPERTENSION: ICD-10-CM

## 2023-08-01 LAB
ALBUMIN SERPL BCP-MCNC: 3.3 G/DL (ref 3.5–5.2)
ALP SERPL-CCNC: 112 U/L (ref 55–135)
ALT SERPL W/O P-5'-P-CCNC: 14 U/L (ref 10–44)
ANION GAP SERPL CALC-SCNC: 10 MMOL/L (ref 8–16)
APAP SERPL-MCNC: <3 UG/ML (ref 10–20)
AST SERPL-CCNC: 14 U/L (ref 10–40)
BILIRUB SERPL-MCNC: 0.5 MG/DL (ref 0.1–1)
BUN SERPL-MCNC: 17 MG/DL (ref 6–30)
BUN SERPL-MCNC: 18 MG/DL (ref 8–23)
CALCIUM SERPL-MCNC: 8.7 MG/DL (ref 8.7–10.5)
CHLORIDE SERPL-SCNC: 108 MMOL/L (ref 95–110)
CHLORIDE SERPL-SCNC: 109 MMOL/L (ref 95–110)
CO2 SERPL-SCNC: 22 MMOL/L (ref 23–29)
CREAT SERPL-MCNC: 1.1 MG/DL (ref 0.5–1.4)
CREAT SERPL-MCNC: 1.2 MG/DL (ref 0.5–1.4)
EST. GFR  (NO RACE VARIABLE): 54.4 ML/MIN/1.73 M^2
ETHANOL SERPL-MCNC: <10 MG/DL
GLUCOSE SERPL-MCNC: 84 MG/DL (ref 70–110)
GLUCOSE SERPL-MCNC: 89 MG/DL (ref 70–110)
HCT VFR BLD CALC: 16 %PCV (ref 36–54)
HCV AB SERPL QL IA: NORMAL
HIV 1+2 AB+HIV1 P24 AG SERPL QL IA: NORMAL
POC IONIZED CALCIUM: 1.06 MMOL/L (ref 1.06–1.42)
POC TCO2 (MEASURED): 22 MMOL/L (ref 23–29)
POTASSIUM BLD-SCNC: 3.4 MMOL/L (ref 3.5–5.1)
POTASSIUM SERPL-SCNC: 3.6 MMOL/L (ref 3.5–5.1)
PROT SERPL-MCNC: 5.9 G/DL (ref 6–8.4)
SAMPLE: ABNORMAL
SODIUM BLD-SCNC: 139 MMOL/L (ref 136–145)
SODIUM SERPL-SCNC: 141 MMOL/L (ref 136–145)
TSH SERPL DL<=0.005 MIU/L-ACNC: 2.51 UIU/ML (ref 0.4–4)

## 2023-08-01 PROCEDURE — 86803 HEPATITIS C AB TEST: CPT | Mod: HCNC | Performed by: PHYSICIAN ASSISTANT

## 2023-08-01 PROCEDURE — 83605 ASSAY OF LACTIC ACID: CPT | Mod: HCNC

## 2023-08-01 PROCEDURE — 80053 COMPREHEN METABOLIC PANEL: CPT | Mod: HCNC | Performed by: EMERGENCY MEDICINE

## 2023-08-01 PROCEDURE — 84443 ASSAY THYROID STIM HORMONE: CPT | Mod: HCNC | Performed by: EMERGENCY MEDICINE

## 2023-08-01 PROCEDURE — 82077 ASSAY SPEC XCP UR&BREATH IA: CPT | Mod: HCNC | Performed by: EMERGENCY MEDICINE

## 2023-08-01 PROCEDURE — 87389 HIV-1 AG W/HIV-1&-2 AB AG IA: CPT | Mod: HCNC | Performed by: PHYSICIAN ASSISTANT

## 2023-08-01 PROCEDURE — 82330 ASSAY OF CALCIUM: CPT

## 2023-08-01 PROCEDURE — 85025 COMPLETE CBC W/AUTO DIFF WBC: CPT | Mod: 91,HCNC | Performed by: EMERGENCY MEDICINE

## 2023-08-01 PROCEDURE — 80047 BASIC METABLC PNL IONIZED CA: CPT | Mod: HCNC

## 2023-08-01 PROCEDURE — 85025 COMPLETE CBC W/AUTO DIFF WBC: CPT | Mod: HCNC | Performed by: NURSE PRACTITIONER

## 2023-08-01 PROCEDURE — 96374 THER/PROPH/DIAG INJ IV PUSH: CPT | Mod: 59,HCNC

## 2023-08-01 PROCEDURE — 86920 COMPATIBILITY TEST SPIN: CPT | Mod: HCNC | Performed by: STUDENT IN AN ORGANIZED HEALTH CARE EDUCATION/TRAINING PROGRAM

## 2023-08-01 PROCEDURE — 82565 ASSAY OF CREATININE: CPT | Mod: 91,HCNC

## 2023-08-01 PROCEDURE — 80143 DRUG ASSAY ACETAMINOPHEN: CPT | Mod: HCNC | Performed by: EMERGENCY MEDICINE

## 2023-08-01 PROCEDURE — 99285 EMERGENCY DEPT VISIT HI MDM: CPT | Mod: 25,HCNC

## 2023-08-01 PROCEDURE — 86900 BLOOD TYPING SEROLOGIC ABO: CPT | Mod: HCNC | Performed by: NURSE PRACTITIONER

## 2023-08-02 PROBLEM — D64.9 SYMPTOMATIC ANEMIA: Status: ACTIVE | Noted: 2023-08-02

## 2023-08-02 PROBLEM — F31.81 BIPOLAR 2 DISORDER: Status: ACTIVE | Noted: 2023-08-02

## 2023-08-02 PROBLEM — D69.6 THROMBOCYTOPENIA: Status: ACTIVE | Noted: 2023-08-02

## 2023-08-02 PROBLEM — I10 ESSENTIAL HYPERTENSION: Status: ACTIVE | Noted: 2023-08-02

## 2023-08-02 PROBLEM — F29 PSYCHOSIS: Status: ACTIVE | Noted: 2023-08-02

## 2023-08-02 PROBLEM — S92.341A CLOSED DISPLACED FRACTURE OF FOURTH METATARSAL BONE OF RIGHT FOOT: Status: ACTIVE | Noted: 2023-08-02

## 2023-08-02 LAB
ABO + RH BLD: NORMAL
ALBUMIN SERPL BCP-MCNC: 3.4 G/DL (ref 3.5–5.2)
ALP SERPL-CCNC: 121 U/L (ref 55–135)
ALT SERPL W/O P-5'-P-CCNC: 12 U/L (ref 10–44)
AMPHET+METHAMPHET UR QL: NEGATIVE
ANION GAP SERPL CALC-SCNC: 13 MMOL/L (ref 8–16)
ANISOCYTOSIS BLD QL SMEAR: SLIGHT
AST SERPL-CCNC: 17 U/L (ref 10–40)
AV INDEX (PROSTH): 0.64
AV MEAN GRADIENT: 7 MMHG
AV PEAK GRADIENT: 13 MMHG
AV VALVE AREA BY VELOCITY RATIO: 2.17 CM²
AV VALVE AREA: 2.15 CM²
AV VELOCITY RATIO: 0.64
BACTERIA #/AREA URNS AUTO: NORMAL /HPF
BARBITURATES UR QL SCN>200 NG/ML: NEGATIVE
BASO STIPL BLD QL SMEAR: ABNORMAL
BASOPHILS # BLD AUTO: 0.03 K/UL (ref 0–0.2)
BASOPHILS # BLD AUTO: 0.04 K/UL (ref 0–0.2)
BASOPHILS # BLD AUTO: 0.04 K/UL (ref 0–0.2)
BASOPHILS NFR BLD: 0.3 % (ref 0–1.9)
BASOPHILS NFR BLD: 0.4 % (ref 0–1.9)
BASOPHILS NFR BLD: 0.5 % (ref 0–1.9)
BASOPHILS NFR BLD: 0.5 % (ref 0–1.9)
BASOPHILS NFR BLD: 0.6 % (ref 0–1.9)
BENZODIAZ UR QL SCN>200 NG/ML: NEGATIVE
BILIRUB SERPL-MCNC: 0.9 MG/DL (ref 0.1–1)
BILIRUB UR QL STRIP: NEGATIVE
BLD GP AB SCN CELLS X3 SERPL QL: NORMAL
BSA FOR ECHO PROCEDURE: 1.93 M2
BUN SERPL-MCNC: 16 MG/DL (ref 8–23)
BURR CELLS BLD QL SMEAR: ABNORMAL
BZE UR QL SCN: NEGATIVE
CALCIUM SERPL-MCNC: 8.7 MG/DL (ref 8.7–10.5)
CANNABINOIDS UR QL SCN: NEGATIVE
CHLORIDE SERPL-SCNC: 104 MMOL/L (ref 95–110)
CLARITY UR REFRACT.AUTO: CLEAR
CO2 SERPL-SCNC: 24 MMOL/L (ref 23–29)
COLOR UR AUTO: ABNORMAL
CREAT SERPL-MCNC: 1 MG/DL (ref 0.5–1.4)
CREAT UR-MCNC: 23 MG/DL (ref 15–325)
CV ECHO LV RWT: 0.38 CM
DIFFERENTIAL METHOD: ABNORMAL
DOP CALC AO PEAK VEL: 1.83 M/S
DOP CALC AO VTI: 43.07 CM
DOP CALC LVOT AREA: 3.4 CM2
DOP CALC LVOT DIAMETER: 2.07 CM
DOP CALC LVOT PEAK VEL: 1.18 M/S
DOP CALC LVOT STROKE VOLUME: 92.63 CM3
DOP CALCLVOT PEAK VEL VTI: 27.54 CM
E WAVE DECELERATION TIME: 157.11 MSEC
E/A RATIO: 1.24
E/E' RATIO: 10.32 M/S
ECHO LV POSTERIOR WALL: 0.95 CM (ref 0.6–1.1)
EJECTION FRACTION: 55 %
EOSINOPHIL # BLD AUTO: 0 K/UL (ref 0–0.5)
EOSINOPHIL # BLD AUTO: 0.2 K/UL (ref 0–0.5)
EOSINOPHIL # BLD AUTO: 0.3 K/UL (ref 0–0.5)
EOSINOPHIL NFR BLD: 0.3 % (ref 0–8)
EOSINOPHIL NFR BLD: 3.1 % (ref 0–8)
EOSINOPHIL NFR BLD: 4 % (ref 0–8)
EOSINOPHIL NFR BLD: 4 % (ref 0–8)
EOSINOPHIL NFR BLD: 4.7 % (ref 0–8)
ERYTHROCYTE [DISTWIDTH] IN BLOOD BY AUTOMATED COUNT: 19.9 % (ref 11.5–14.5)
ERYTHROCYTE [DISTWIDTH] IN BLOOD BY AUTOMATED COUNT: 20 % (ref 11.5–14.5)
ERYTHROCYTE [DISTWIDTH] IN BLOOD BY AUTOMATED COUNT: 25.1 % (ref 11.5–14.5)
ERYTHROCYTE [DISTWIDTH] IN BLOOD BY AUTOMATED COUNT: 26.1 % (ref 11.5–14.5)
ERYTHROCYTE [DISTWIDTH] IN BLOOD BY AUTOMATED COUNT: 26.3 % (ref 11.5–14.5)
EST. GFR  (NO RACE VARIABLE): >60 ML/MIN/1.73 M^2
FERRITIN SERPL-MCNC: 22 NG/ML (ref 20–300)
FOLATE SERPL-MCNC: 16.1 NG/ML (ref 4–24)
FRACTIONAL SHORTENING: 30 % (ref 28–44)
GLUCOSE SERPL-MCNC: 104 MG/DL (ref 70–110)
GLUCOSE UR QL STRIP: NEGATIVE
HCT VFR BLD AUTO: 17.3 % (ref 37–48.5)
HCT VFR BLD AUTO: 18 % (ref 37–48.5)
HCT VFR BLD AUTO: 22.7 % (ref 37–48.5)
HCT VFR BLD AUTO: 28.9 % (ref 37–48.5)
HCT VFR BLD AUTO: 29.1 % (ref 37–48.5)
HGB BLD-MCNC: 4.1 G/DL (ref 12–16)
HGB BLD-MCNC: 4.4 G/DL (ref 12–16)
HGB BLD-MCNC: 6.1 G/DL (ref 12–16)
HGB BLD-MCNC: 7.7 G/DL (ref 12–16)
HGB BLD-MCNC: 8.8 G/DL (ref 12–16)
HGB UR QL STRIP: NEGATIVE
IMM GRANULOCYTES # BLD AUTO: 0.01 K/UL (ref 0–0.04)
IMM GRANULOCYTES # BLD AUTO: 0.02 K/UL (ref 0–0.04)
IMM GRANULOCYTES # BLD AUTO: 0.03 K/UL (ref 0–0.04)
IMM GRANULOCYTES # BLD AUTO: 0.05 K/UL (ref 0–0.04)
IMM GRANULOCYTES # BLD AUTO: 0.19 K/UL (ref 0–0.04)
IMM GRANULOCYTES NFR BLD AUTO: 0.2 % (ref 0–0.5)
IMM GRANULOCYTES NFR BLD AUTO: 0.4 % (ref 0–0.5)
IMM GRANULOCYTES NFR BLD AUTO: 0.4 % (ref 0–0.5)
IMM GRANULOCYTES NFR BLD AUTO: 0.5 % (ref 0–0.5)
IMM GRANULOCYTES NFR BLD AUTO: 2.1 % (ref 0–0.5)
INTERVENTRICULAR SEPTUM: 0.87 CM (ref 0.6–1.1)
IRON SERPL-MCNC: 14 UG/DL (ref 30–160)
KETONES UR QL STRIP: NEGATIVE
LA MAJOR: 5.44 CM
LA MINOR: 5.16 CM
LA WIDTH: 4 CM
LDH SERPL L TO P-CCNC: 341 U/L (ref 110–260)
LEFT ATRIUM SIZE: 4 CM
LEFT ATRIUM VOLUME INDEX MOD: 28.5 ML/M2
LEFT ATRIUM VOLUME INDEX: 37.9 ML/M2
LEFT ATRIUM VOLUME MOD: 54.19 CM3
LEFT ATRIUM VOLUME: 72.03 CM3
LEFT INTERNAL DIMENSION IN SYSTOLE: 3.5 CM (ref 2.1–4)
LEFT VENTRICLE DIASTOLIC VOLUME INDEX: 63.02 ML/M2
LEFT VENTRICLE DIASTOLIC VOLUME: 119.73 ML
LEFT VENTRICLE MASS INDEX: 85 G/M2
LEFT VENTRICLE SYSTOLIC VOLUME INDEX: 26.8 ML/M2
LEFT VENTRICLE SYSTOLIC VOLUME: 50.9 ML
LEFT VENTRICULAR INTERNAL DIMENSION IN DIASTOLE: 5.03 CM (ref 3.5–6)
LEFT VENTRICULAR MASS: 162.14 G
LEUKOCYTE ESTERASE UR QL STRIP: ABNORMAL
LV LATERAL E/E' RATIO: 8.91 M/S
LV SEPTAL E/E' RATIO: 12.25 M/S
LYMPHOCYTES # BLD AUTO: 0.3 K/UL (ref 1–4.8)
LYMPHOCYTES # BLD AUTO: 0.8 K/UL (ref 1–4.8)
LYMPHOCYTES # BLD AUTO: 0.9 K/UL (ref 1–4.8)
LYMPHOCYTES # BLD AUTO: 1.2 K/UL (ref 1–4.8)
LYMPHOCYTES # BLD AUTO: 1.2 K/UL (ref 1–4.8)
LYMPHOCYTES NFR BLD: 15.7 % (ref 18–48)
LYMPHOCYTES NFR BLD: 17.9 % (ref 18–48)
LYMPHOCYTES NFR BLD: 19.7 % (ref 18–48)
LYMPHOCYTES NFR BLD: 3.6 % (ref 18–48)
LYMPHOCYTES NFR BLD: 9.2 % (ref 18–48)
MCH RBC QN AUTO: 15.8 PG (ref 27–31)
MCH RBC QN AUTO: 16.1 PG (ref 27–31)
MCH RBC QN AUTO: 18.6 PG (ref 27–31)
MCH RBC QN AUTO: 20.9 PG (ref 27–31)
MCH RBC QN AUTO: 21 PG (ref 27–31)
MCHC RBC AUTO-ENTMCNC: 23.7 G/DL (ref 32–36)
MCHC RBC AUTO-ENTMCNC: 24.4 G/DL (ref 32–36)
MCHC RBC AUTO-ENTMCNC: 26.6 G/DL (ref 32–36)
MCHC RBC AUTO-ENTMCNC: 26.9 G/DL (ref 32–36)
MCHC RBC AUTO-ENTMCNC: 30.2 G/DL (ref 32–36)
MCV RBC AUTO: 66 FL (ref 82–98)
MCV RBC AUTO: 67 FL (ref 82–98)
MCV RBC AUTO: 69 FL (ref 82–98)
MCV RBC AUTO: 69 FL (ref 82–98)
MCV RBC AUTO: 79 FL (ref 82–98)
METHADONE UR QL SCN>300 NG/ML: NEGATIVE
MICROSCOPIC COMMENT: NORMAL
MONOCYTES # BLD AUTO: 0.1 K/UL (ref 0.3–1)
MONOCYTES # BLD AUTO: 0.4 K/UL (ref 0.3–1)
MONOCYTES # BLD AUTO: 0.6 K/UL (ref 0.3–1)
MONOCYTES # BLD AUTO: 0.6 K/UL (ref 0.3–1)
MONOCYTES # BLD AUTO: 0.7 K/UL (ref 0.3–1)
MONOCYTES NFR BLD: 1.4 % (ref 4–15)
MONOCYTES NFR BLD: 7.5 % (ref 4–15)
MONOCYTES NFR BLD: 7.5 % (ref 4–15)
MONOCYTES NFR BLD: 8.7 % (ref 4–15)
MONOCYTES NFR BLD: 9.6 % (ref 4–15)
MV A" WAVE DURATION": 11.04 MSEC
MV PEAK A VEL: 0.79 M/S
MV PEAK E VEL: 0.98 M/S
MV STENOSIS PRESSURE HALF TIME: 45.56 MS
MV VALVE AREA P 1/2 METHOD: 4.83 CM2
NEUTROPHILS # BLD AUTO: 3.1 K/UL (ref 1.8–7.7)
NEUTROPHILS # BLD AUTO: 4.5 K/UL (ref 1.8–7.7)
NEUTROPHILS # BLD AUTO: 5.3 K/UL (ref 1.8–7.7)
NEUTROPHILS # BLD AUTO: 7.3 K/UL (ref 1.8–7.7)
NEUTROPHILS # BLD AUTO: 8.4 K/UL (ref 1.8–7.7)
NEUTROPHILS NFR BLD: 66.6 % (ref 38–73)
NEUTROPHILS NFR BLD: 67.1 % (ref 38–73)
NEUTROPHILS NFR BLD: 71.9 % (ref 38–73)
NEUTROPHILS NFR BLD: 79.3 % (ref 38–73)
NEUTROPHILS NFR BLD: 92.3 % (ref 38–73)
NITRITE UR QL STRIP: NEGATIVE
NRBC BLD-RTO: 0 /100 WBC
NRBC BLD-RTO: 1 /100 WBC
OPIATES UR QL SCN: NEGATIVE
PCP UR QL SCN>25 NG/ML: NEGATIVE
PH UR STRIP: 6 [PH] (ref 5–8)
PISA TR MAX VEL: 3.1 M/S
PLATELET # BLD AUTO: 179 K/UL (ref 150–450)
PLATELET # BLD AUTO: 187 K/UL (ref 150–450)
PLATELET # BLD AUTO: 191 K/UL (ref 150–450)
PLATELET # BLD AUTO: 214 K/UL (ref 150–450)
PLATELET # BLD AUTO: 94 K/UL (ref 150–450)
PLATELET BLD QL SMEAR: ABNORMAL
PMV BLD AUTO: 11.2 FL (ref 9.2–12.9)
PMV BLD AUTO: ABNORMAL FL (ref 9.2–12.9)
POCT GLUCOSE: 224 MG/DL (ref 70–110)
POIKILOCYTOSIS BLD QL SMEAR: SLIGHT
POLYCHROMASIA BLD QL SMEAR: ABNORMAL
POTASSIUM SERPL-SCNC: 3.4 MMOL/L (ref 3.5–5.1)
PROT SERPL-MCNC: 6 G/DL (ref 6–8.4)
PROT UR QL STRIP: NEGATIVE
PULM VEIN S/D RATIO: 1
PV PEAK D VEL: 0.99 M/S
PV PEAK S VEL: 0.99 M/S
RA MAJOR: 5 CM
RA PRESSURE ESTIMATED: 8 MMHG
RA WIDTH: 3.9 CM
RBC # BLD AUTO: 2.6 M/UL (ref 4–5.4)
RBC # BLD AUTO: 2.73 M/UL (ref 4–5.4)
RBC # BLD AUTO: 3.28 M/UL (ref 4–5.4)
RBC # BLD AUTO: 3.68 M/UL (ref 4–5.4)
RBC # BLD AUTO: 4.2 M/UL (ref 4–5.4)
RBC #/AREA URNS AUTO: 2 /HPF (ref 0–4)
RIGHT VENTRICULAR END-DIASTOLIC DIMENSION: 3.54 CM
RV TB RVSP: 11 MMHG
SATURATED IRON: 3 % (ref 20–50)
SINUS: 2.7 CM
SODIUM SERPL-SCNC: 141 MMOL/L (ref 136–145)
SP GR UR STRIP: 1.01 (ref 1–1.03)
SPECIMEN OUTDATE: NORMAL
SQUAMOUS #/AREA URNS AUTO: 2 /HPF
STJ: 2.59 CM
TDI LATERAL: 0.11 M/S
TDI SEPTAL: 0.08 M/S
TDI: 0.1 M/S
TOTAL IRON BINDING CAPACITY: 487 UG/DL (ref 250–450)
TOXICOLOGY INFORMATION: NORMAL
TR MAX PG: 38 MMHG
TRANSFERRIN SERPL-MCNC: 329 MG/DL (ref 200–375)
TRICUSPID ANNULAR PLANE SYSTOLIC EXCURSION: 2.34 CM
TV REST PULMONARY ARTERY PRESSURE: 46 MMHG
URN SPEC COLLECT METH UR: ABNORMAL
VIT B12 SERPL-MCNC: 185 PG/ML (ref 210–950)
WBC # BLD AUTO: 4.71 K/UL (ref 3.9–12.7)
WBC # BLD AUTO: 6.64 K/UL (ref 3.9–12.7)
WBC # BLD AUTO: 7.31 K/UL (ref 3.9–12.7)
WBC # BLD AUTO: 9.14 K/UL (ref 3.9–12.7)
WBC # BLD AUTO: 9.16 K/UL (ref 3.9–12.7)
WBC #/AREA URNS AUTO: 3 /HPF (ref 0–5)
Z-SCORE OF LEFT VENTRICULAR DIMENSION IN END DIASTOLE: -0.58
Z-SCORE OF LEFT VENTRICULAR DIMENSION IN END SYSTOLE: 0.5

## 2023-08-02 PROCEDURE — 90792 PSYCH DIAG EVAL W/MED SRVCS: CPT | Mod: HCNC,GC,, | Performed by: PSYCHIATRY & NEUROLOGY

## 2023-08-02 PROCEDURE — P9021 RED BLOOD CELLS UNIT: HCPCS | Mod: HCNC | Performed by: STUDENT IN AN ORGANIZED HEALTH CARE EDUCATION/TRAINING PROGRAM

## 2023-08-02 PROCEDURE — 93010 ELECTROCARDIOGRAM REPORT: CPT | Mod: HCNC,,, | Performed by: INTERNAL MEDICINE

## 2023-08-02 PROCEDURE — 82728 ASSAY OF FERRITIN: CPT | Mod: HCNC | Performed by: STUDENT IN AN ORGANIZED HEALTH CARE EDUCATION/TRAINING PROGRAM

## 2023-08-02 PROCEDURE — 81001 URINALYSIS AUTO W/SCOPE: CPT | Mod: HCNC | Performed by: EMERGENCY MEDICINE

## 2023-08-02 PROCEDURE — 99223 PR INITIAL HOSPITAL CARE,LEVL III: ICD-10-PCS | Mod: AI,HCNC,, | Performed by: STUDENT IN AN ORGANIZED HEALTH CARE EDUCATION/TRAINING PROGRAM

## 2023-08-02 PROCEDURE — 63600175 PHARM REV CODE 636 W HCPCS: Mod: HCNC | Performed by: STUDENT IN AN ORGANIZED HEALTH CARE EDUCATION/TRAINING PROGRAM

## 2023-08-02 PROCEDURE — 80053 COMPREHEN METABOLIC PANEL: CPT | Mod: HCNC

## 2023-08-02 PROCEDURE — 25000242 PHARM REV CODE 250 ALT 637 W/ HCPCS: Mod: HCNC

## 2023-08-02 PROCEDURE — 25000003 PHARM REV CODE 250: Mod: HCNC

## 2023-08-02 PROCEDURE — 96375 TX/PRO/DX INJ NEW DRUG ADDON: CPT | Mod: HCNC,59

## 2023-08-02 PROCEDURE — 94799 UNLISTED PULMONARY SVC/PX: CPT | Mod: HCNC,XB

## 2023-08-02 PROCEDURE — 99223 1ST HOSP IP/OBS HIGH 75: CPT | Mod: AI,HCNC,, | Performed by: STUDENT IN AN ORGANIZED HEALTH CARE EDUCATION/TRAINING PROGRAM

## 2023-08-02 PROCEDURE — G0378 HOSPITAL OBSERVATION PER HR: HCPCS | Mod: HCNC

## 2023-08-02 PROCEDURE — 93010 EKG 12-LEAD: ICD-10-PCS | Mod: HCNC,,, | Performed by: INTERNAL MEDICINE

## 2023-08-02 PROCEDURE — 36415 COLL VENOUS BLD VENIPUNCTURE: CPT | Mod: HCNC

## 2023-08-02 PROCEDURE — 85025 COMPLETE CBC W/AUTO DIFF WBC: CPT | Mod: 91,HCNC

## 2023-08-02 PROCEDURE — 63600175 PHARM REV CODE 636 W HCPCS: Mod: HCNC | Performed by: INTERNAL MEDICINE

## 2023-08-02 PROCEDURE — 94640 AIRWAY INHALATION TREATMENT: CPT | Mod: HCNC

## 2023-08-02 PROCEDURE — 82607 VITAMIN B-12: CPT | Mod: HCNC | Performed by: STUDENT IN AN ORGANIZED HEALTH CARE EDUCATION/TRAINING PROGRAM

## 2023-08-02 PROCEDURE — 36430 TRANSFUSION BLD/BLD COMPNT: CPT | Mod: HCNC

## 2023-08-02 PROCEDURE — 63600175 PHARM REV CODE 636 W HCPCS: Mod: HCNC

## 2023-08-02 PROCEDURE — 80307 DRUG TEST PRSMV CHEM ANLYZR: CPT | Mod: HCNC | Performed by: EMERGENCY MEDICINE

## 2023-08-02 PROCEDURE — 82746 ASSAY OF FOLIC ACID SERUM: CPT | Mod: HCNC | Performed by: STUDENT IN AN ORGANIZED HEALTH CARE EDUCATION/TRAINING PROGRAM

## 2023-08-02 PROCEDURE — 94761 N-INVAS EAR/PLS OXIMETRY MLT: CPT | Mod: HCNC

## 2023-08-02 PROCEDURE — 84466 ASSAY OF TRANSFERRIN: CPT | Mod: HCNC | Performed by: STUDENT IN AN ORGANIZED HEALTH CARE EDUCATION/TRAINING PROGRAM

## 2023-08-02 PROCEDURE — 83615 LACTATE (LD) (LDH) ENZYME: CPT | Mod: HCNC | Performed by: STUDENT IN AN ORGANIZED HEALTH CARE EDUCATION/TRAINING PROGRAM

## 2023-08-02 PROCEDURE — 25000003 PHARM REV CODE 250: Mod: HCNC | Performed by: STUDENT IN AN ORGANIZED HEALTH CARE EDUCATION/TRAINING PROGRAM

## 2023-08-02 PROCEDURE — 99900035 HC TECH TIME PER 15 MIN (STAT): Mod: HCNC

## 2023-08-02 PROCEDURE — 90792 PR PSYCHIATRIC DIAGNOSTIC EVALUATION W/MEDICAL SERVICES: ICD-10-PCS | Mod: HCNC,GC,, | Performed by: PSYCHIATRY & NEUROLOGY

## 2023-08-02 PROCEDURE — 85025 COMPLETE CBC W/AUTO DIFF WBC: CPT | Mod: HCNC

## 2023-08-02 PROCEDURE — 96374 THER/PROPH/DIAG INJ IV PUSH: CPT | Mod: 59,HCNC

## 2023-08-02 PROCEDURE — 80175 DRUG SCREEN QUAN LAMOTRIGINE: CPT | Mod: HCNC | Performed by: STUDENT IN AN ORGANIZED HEALTH CARE EDUCATION/TRAINING PROGRAM

## 2023-08-02 PROCEDURE — 93005 ELECTROCARDIOGRAM TRACING: CPT | Mod: HCNC

## 2023-08-02 RX ORDER — HYDROCODONE BITARTRATE AND ACETAMINOPHEN 500; 5 MG/1; MG/1
TABLET ORAL
Status: DISCONTINUED | OUTPATIENT
Start: 2023-08-02 | End: 2023-08-07 | Stop reason: HOSPADM

## 2023-08-02 RX ORDER — POTASSIUM CHLORIDE 20 MEQ/1
40 TABLET, EXTENDED RELEASE ORAL ONCE
Status: COMPLETED | OUTPATIENT
Start: 2023-08-02 | End: 2023-08-02

## 2023-08-02 RX ORDER — ATORVASTATIN CALCIUM 20 MG/1
20 TABLET, FILM COATED ORAL DAILY
Status: DISCONTINUED | OUTPATIENT
Start: 2023-08-02 | End: 2023-08-07 | Stop reason: HOSPADM

## 2023-08-02 RX ORDER — LORAZEPAM 0.5 MG/1
0.5 TABLET ORAL DAILY PRN
Status: DISCONTINUED | OUTPATIENT
Start: 2023-08-02 | End: 2023-08-07 | Stop reason: HOSPADM

## 2023-08-02 RX ORDER — OLANZAPINE 5 MG/1
5 TABLET ORAL DAILY
Status: ON HOLD | COMMUNITY
End: 2023-09-25 | Stop reason: HOSPADM

## 2023-08-02 RX ORDER — POLYETHYLENE GLYCOL 3350 17 G/17G
17 POWDER, FOR SOLUTION ORAL DAILY PRN
Status: DISCONTINUED | OUTPATIENT
Start: 2023-08-02 | End: 2023-08-07 | Stop reason: HOSPADM

## 2023-08-02 RX ORDER — BISACODYL 10 MG
10 SUPPOSITORY, RECTAL RECTAL DAILY PRN
Status: DISCONTINUED | OUTPATIENT
Start: 2023-08-02 | End: 2023-08-07 | Stop reason: HOSPADM

## 2023-08-02 RX ORDER — HYDRALAZINE HYDROCHLORIDE 25 MG/1
25 TABLET, FILM COATED ORAL EVERY 8 HOURS PRN
Status: DISCONTINUED | OUTPATIENT
Start: 2023-08-02 | End: 2023-08-06

## 2023-08-02 RX ORDER — TALC
6 POWDER (GRAM) TOPICAL NIGHTLY PRN
Status: DISCONTINUED | OUTPATIENT
Start: 2023-08-02 | End: 2023-08-07 | Stop reason: HOSPADM

## 2023-08-02 RX ORDER — ONDANSETRON 8 MG/1
8 TABLET, ORALLY DISINTEGRATING ORAL EVERY 8 HOURS PRN
Status: DISCONTINUED | OUTPATIENT
Start: 2023-08-02 | End: 2023-08-07 | Stop reason: HOSPADM

## 2023-08-02 RX ORDER — LAMOTRIGINE 100 MG/1
200 TABLET ORAL 2 TIMES DAILY
Status: DISCONTINUED | OUTPATIENT
Start: 2023-08-02 | End: 2023-08-07 | Stop reason: HOSPADM

## 2023-08-02 RX ORDER — NAPROXEN 500 MG/1
500 TABLET ORAL 2 TIMES DAILY WITH MEALS
COMMUNITY

## 2023-08-02 RX ORDER — GABAPENTIN 100 MG/1
200 CAPSULE ORAL 2 TIMES DAILY
Status: DISCONTINUED | OUTPATIENT
Start: 2023-08-02 | End: 2023-08-05

## 2023-08-02 RX ORDER — PROMETHAZINE HYDROCHLORIDE 25 MG/1
25 TABLET ORAL EVERY 6 HOURS PRN
Status: DISCONTINUED | OUTPATIENT
Start: 2023-08-02 | End: 2023-08-07 | Stop reason: HOSPADM

## 2023-08-02 RX ORDER — DULOXETIN HYDROCHLORIDE 30 MG/1
30 CAPSULE, DELAYED RELEASE ORAL NIGHTLY
Status: ON HOLD | COMMUNITY
End: 2023-09-25 | Stop reason: SDUPTHER

## 2023-08-02 RX ORDER — DULOXETIN HYDROCHLORIDE 60 MG/1
60 CAPSULE, DELAYED RELEASE ORAL DAILY
Status: DISCONTINUED | OUTPATIENT
Start: 2023-08-02 | End: 2023-08-07 | Stop reason: HOSPADM

## 2023-08-02 RX ORDER — LANOLIN ALCOHOL/MO/W.PET/CERES
1 CREAM (GRAM) TOPICAL DAILY
Status: DISCONTINUED | OUTPATIENT
Start: 2023-08-02 | End: 2023-08-07 | Stop reason: HOSPADM

## 2023-08-02 RX ORDER — IBUPROFEN 200 MG
24 TABLET ORAL
Status: DISCONTINUED | OUTPATIENT
Start: 2023-08-02 | End: 2023-08-07 | Stop reason: HOSPADM

## 2023-08-02 RX ORDER — LOSARTAN POTASSIUM AND HYDROCHLOROTHIAZIDE 12.5; 5 MG/1; MG/1
1 TABLET ORAL DAILY
Status: DISCONTINUED | OUTPATIENT
Start: 2023-08-02 | End: 2023-08-03

## 2023-08-02 RX ORDER — BUPROPION HYDROCHLORIDE 150 MG/1
150 TABLET ORAL DAILY
Status: ON HOLD | COMMUNITY
End: 2023-09-25 | Stop reason: HOSPADM

## 2023-08-02 RX ORDER — IPRATROPIUM BROMIDE AND ALBUTEROL SULFATE 2.5; .5 MG/3ML; MG/3ML
3 SOLUTION RESPIRATORY (INHALATION) EVERY 4 HOURS PRN
Status: DISCONTINUED | OUTPATIENT
Start: 2023-08-02 | End: 2023-08-02

## 2023-08-02 RX ORDER — ACETAMINOPHEN 325 MG/1
650 TABLET ORAL EVERY 4 HOURS PRN
Status: DISCONTINUED | OUTPATIENT
Start: 2023-08-02 | End: 2023-08-07 | Stop reason: HOSPADM

## 2023-08-02 RX ORDER — METHYLPREDNISOLONE SOD SUCC 125 MG
125 VIAL (EA) INJECTION ONCE
Status: COMPLETED | OUTPATIENT
Start: 2023-08-02 | End: 2023-08-02

## 2023-08-02 RX ORDER — FUROSEMIDE 10 MG/ML
40 INJECTION INTRAMUSCULAR; INTRAVENOUS ONCE
Status: COMPLETED | OUTPATIENT
Start: 2023-08-02 | End: 2023-08-02

## 2023-08-02 RX ORDER — IPRATROPIUM BROMIDE AND ALBUTEROL SULFATE 2.5; .5 MG/3ML; MG/3ML
3 SOLUTION RESPIRATORY (INHALATION) EVERY 4 HOURS PRN
Status: DISCONTINUED | OUTPATIENT
Start: 2023-08-02 | End: 2023-08-07 | Stop reason: HOSPADM

## 2023-08-02 RX ORDER — CYANOCOBALAMIN (VITAMIN B-12) 250 MCG
250 TABLET ORAL DAILY
Status: DISCONTINUED | OUTPATIENT
Start: 2023-08-02 | End: 2023-08-07 | Stop reason: HOSPADM

## 2023-08-02 RX ORDER — IPRATROPIUM BROMIDE AND ALBUTEROL SULFATE 2.5; .5 MG/3ML; MG/3ML
SOLUTION RESPIRATORY (INHALATION)
Status: COMPLETED
Start: 2023-08-02 | End: 2023-08-02

## 2023-08-02 RX ORDER — IPRATROPIUM BROMIDE AND ALBUTEROL SULFATE 2.5; .5 MG/3ML; MG/3ML
3 SOLUTION RESPIRATORY (INHALATION)
Status: DISCONTINUED | OUTPATIENT
Start: 2023-08-02 | End: 2023-08-04

## 2023-08-02 RX ORDER — METOPROLOL SUCCINATE 100 MG/1
100 TABLET, EXTENDED RELEASE ORAL DAILY
Status: DISCONTINUED | OUTPATIENT
Start: 2023-08-02 | End: 2023-08-07 | Stop reason: HOSPADM

## 2023-08-02 RX ORDER — SODIUM CHLORIDE 0.9 % (FLUSH) 0.9 %
10 SYRINGE (ML) INJECTION
Status: DISCONTINUED | OUTPATIENT
Start: 2023-08-02 | End: 2023-08-07 | Stop reason: HOSPADM

## 2023-08-02 RX ORDER — IBUPROFEN 200 MG
16 TABLET ORAL
Status: DISCONTINUED | OUTPATIENT
Start: 2023-08-02 | End: 2023-08-07 | Stop reason: HOSPADM

## 2023-08-02 RX ORDER — GLUCAGON 1 MG
1 KIT INJECTION
Status: DISCONTINUED | OUTPATIENT
Start: 2023-08-02 | End: 2023-08-07 | Stop reason: HOSPADM

## 2023-08-02 RX ADMIN — SODIUM CHLORIDE: 9 INJECTION, SOLUTION INTRAVENOUS at 06:08

## 2023-08-02 RX ADMIN — GABAPENTIN 200 MG: 100 CAPSULE ORAL at 10:08

## 2023-08-02 RX ADMIN — ARIPIPRAZOLE 15 MG: 15 TABLET ORAL at 11:08

## 2023-08-02 RX ADMIN — HYDRALAZINE HYDROCHLORIDE 25 MG: 25 TABLET, FILM COATED ORAL at 06:08

## 2023-08-02 RX ADMIN — GABAPENTIN 200 MG: 100 CAPSULE ORAL at 09:08

## 2023-08-02 RX ADMIN — FERROUS SULFATE TAB EC 325 MG (65 MG FE EQUIVALENT) 1 EACH: 325 (65 FE) TABLET DELAYED RESPONSE at 10:08

## 2023-08-02 RX ADMIN — POTASSIUM CHLORIDE 40 MEQ: 1500 TABLET, EXTENDED RELEASE ORAL at 11:08

## 2023-08-02 RX ADMIN — LAMOTRIGINE 200 MG: 100 TABLET ORAL at 10:08

## 2023-08-02 RX ADMIN — LAMOTRIGINE 200 MG: 100 TABLET ORAL at 09:08

## 2023-08-02 RX ADMIN — METOPROLOL SUCCINATE 100 MG: 100 TABLET, EXTENDED RELEASE ORAL at 10:08

## 2023-08-02 RX ADMIN — IPRATROPIUM BROMIDE AND ALBUTEROL SULFATE 3 ML: .5; 3 SOLUTION RESPIRATORY (INHALATION) at 01:08

## 2023-08-02 RX ADMIN — FUROSEMIDE 40 MG: 10 INJECTION, SOLUTION INTRAMUSCULAR; INTRAVENOUS at 01:08

## 2023-08-02 RX ADMIN — DULOXETINE HYDROCHLORIDE 60 MG: 60 CAPSULE, DELAYED RELEASE ORAL at 10:08

## 2023-08-02 RX ADMIN — METHYLPREDNISOLONE SODIUM SUCCINATE 125 MG: 125 INJECTION, POWDER, FOR SOLUTION INTRAMUSCULAR; INTRAVENOUS at 01:08

## 2023-08-02 RX ADMIN — HYDRALAZINE HYDROCHLORIDE 25 MG: 25 TABLET, FILM COATED ORAL at 04:08

## 2023-08-02 RX ADMIN — FUROSEMIDE 40 MG: 10 INJECTION, SOLUTION INTRAVENOUS at 03:08

## 2023-08-02 RX ADMIN — LOSARTAN POTASSIUM AND HYDROCHLOROTHIAZIDE 1 TABLET: 50; 12.5 TABLET, FILM COATED ORAL at 11:08

## 2023-08-02 RX ADMIN — CYANOCOBALAMIN TAB 250 MCG 250 MCG: 250 TAB at 10:08

## 2023-08-02 NOTE — PHARMACY MED REC
"Admission Medication History     The home medication history was taken by Loulou Trevino.    You may go to "Admission" then "Reconcile Home Medications" tabs to review and/or act upon these items.     The home medication list has been updated by the Pharmacy department.   Please read ALL comments highlighted in yellow.   Please address this information as you see fit.    Feel free to contact us if you have any questions or require assistance.      The medications listed below were removed from the home medication list. Please reorder if appropriate:  Patient reports no longer taking the following medication(s):  AZELASTINE 137 NASAL SPRAY  FLUTICASONE PROPIONATE 50 MCG/ ACTUATION NASAL SPRAY  GABAPENTIN 100 MG  LOSARTAN-HYDROCHLOROTHIAZIDE 50-12.5 MG        Medications listed below were obtained from: Patient/family    Current Outpatient Medications on File Prior to Encounter   Medication Sig    acetaminophen (TYLENOL) 500 MG tablet Take 1,000 mg by mouth 2 (two) times daily as needed for Pain.    ARIPiprazole (ABILIFY) 15 MG Tab Take 15 mg by mouth once daily.    atorvastatin (LIPITOR) 20 MG tablet Take 1 tablet by mouth once daily.    cyclobenzaprine (FLEXERIL) 10 MG tablet Take 10 mg by mouth 3 (three) times daily as needed for Muscle spasms.    DULoxetine (CYMBALTA) 30 MG capsule Take 30 mg by mouth every evening. (60MG QAM & 30MG QPM)    DULoxetine (CYMBALTA) 60 MG capsule Take 60 mg by mouth every morning. (60MG QAM & 30MG QPM)    esomeprazole (NEXIUM) 40 MG capsule Take 1 capsule (40 mg total) by mouth once daily.    FLUoxetine 20 MG capsule Take 20 mg by mouth once daily.    lamoTRIgine (LAMICTAL) 200 MG tablet Take 200 mg by mouth 2 (two) times daily.    lorazepam (ATIVAN) 0.5 MG tablet Take 0.5 mg by mouth daily as needed for Anxiety. Pt will take an additional dose if needed on bad days    melatonin (MELATIN) 5 mg Take 10 mg by mouth nightly as needed (sleep).    metoprolol succinate (TOPROL-XL) 100 MG 24 " hr tablet Take 1 tablet by mouth daily.    naproxen (NAPROSYN) 500 MG tablet Take 500 mg by mouth 2 (two) times daily with meals.    OLANZapine (ZYPREXA) 5 MG tablet Take 5 mg by mouth once daily.   Pt states she no longer wants to take this medication.       sumatriptan (IMITREX) 100 MG tablet TAKE ONE TABLET BY MOUTH AT ONSET, THEN ONE TABLET 2 HOURS LATER FOR EACH EPISODE    buPROPion (WELLBUTRIN XL) 150 MG TB24 tablet Take 150 mg by mouth once daily. New prescription. Has not started.    triamcinolone acetonide 0.1% (KENALOG) 0.1 % cream              Loulou Trevino  EXT 10090                    .

## 2023-08-02 NOTE — ED PROVIDER NOTES
Source of History:  Patient, chart    Chief complaint:  Hallucinations (Pt c/o recent visual and auditory hallucinations x2 days. States recent medication change to Zyprexa. Denies SI/HI)      HPI:  Elly Harris is a 68 y.o. female with medical history of depression, bipolar disorder, fibromyalgia, GERD, hypertension presenting with visual and auditory hallucinations for the past 3 days.  Patient states that 2 weeks ago she was started on Zyprexa for her depression and bipolar disorder.  Patient is unsure whether this is contributing to her recent hallucinations.  Patient states she has been hallucinating spiders, mice and men with knives.    This is the extent to the patients complaints today here in the emergency department.    ROS: As per HPI and below:  Constitutional: No fever.  No chills.  Eyes: No visual changes.  ENT: No sore throat. No ear pain    Cardiovascular: No chest pain.  Respiratory: No shortness of breath.  GI: No abdominal pain.  No nausea or vomiting.  Genitourinary: No abnormal urination.  Neurologic: No headache. No focal weakness.  No numbness.  MSK: no back pain.  Integument: No rashes or lesions.  Hematologic: No easy bruising.  Endocrine: No excessive thirst or urination.  Psychiatric:  Positive for hallucinations.    Review of patient's allergies indicates:   Allergen Reactions    Doxycycline Rash and Hives       PMH:  As per HPI and below:  Past Medical History:   Diagnosis Date    Anemia     Depression     Fibromyalgia     GERD (gastroesophageal reflux disease)     Herpes zoster infection of thoracic region 2014    left    Hyperlipidemia     Hypertension     Migraine headache     RLS (restless legs syndrome)      Past Surgical History:   Procedure Laterality Date    BILATERAL SALPINGOOPHORECTOMY       SECTION      CLOSED REDUCTION OF FRACTURE OF NASAL BONE Bilateral 2021    Procedure: CLOSED REDUCTION, FRACTURE, NASAL BONE;  Surgeon: JV Connelly MD;  Location:  "[facility] OR;  Service: ENT;  Laterality: Bilateral;    CLOSED REDUCTION OF FRACTURE OF NASAL BONE Bilateral 2021    Procedure: CLOSED REDUCTION, FRACTURE, NASAL BONE;  Surgeon: JV Connelly MD;  Location: [facility] OR;  Service: ENT;  Laterality: Bilateral;    DEBRIDEMENT TENNIS ELBOW      FEMUR SURGERY  2013    FOOT NEUROMA SURGERY Bilateral     with hammertoe repair    HYSTERECTOMY      vaginal    LASIK      MANDIBLE FRACTURE SURGERY      TONSILLECTOMY      TOTAL VAGINAL HYSTERECTOMY         Social History     Tobacco Use    Smoking status: Former     Current packs/day: 0.00     Types: Cigarettes     Quit date: 1979     Years since quittin.6    Smokeless tobacco: Former   Substance Use Topics    Alcohol use: No    Drug use: Never       Physical Exam:    BP (!) 175/81 (BP Location: Left arm, Patient Position: Sitting)   Pulse 79   Temp 98.4 °F (36.9 °C) (Oral)   Resp 16   Ht 5' 7" (1.702 m)   Wt 78.5 kg (173 lb)   SpO2 100%   Breastfeeding No   BMI 27.10 kg/m²   Nursing note and vital signs reviewed.  Constitutional: No acute distress.  Nontoxic  Eyes: No conjunctival injection.  Extraocular muscles are intact.  ENT: Oropharynx clear.    Cardiovascular: Regular rate and rhythm.  No murmurs. No gallops. No rubs.  Respiratory: Clear to auscultation bilaterally.  Good air movement.  No wheezes.  No rhonchi. No rales. No accessory muscle use.  Abdomen: Soft.  Not distended.  Nontender.  No guarding.  No rebound. Non-peritoneal.  Musculoskeletal: Good range of motion all joints.  No deformities.  Neck supple.  No meningismus.  Skin: No rashes seen.  Good turgor.  No abrasions.  No ecchymoses.  Neurologic:  Alert and oriented x3.  No focal neurological deficits.  Psychiatry:  Denies SI.  Denies HI.  Endorses auditory and visual hallucinations.    [facility]    Emergent evaluation of a 69 yo female presenting for auditory and visual hallucinations.  Patient states that she started with hallucinations " 3 days ago.  Patient states it initially started with hearing a group of mice behind her rocking chair.  Patient states when she looked the mice were looking at her.  Patient states she is also had hallucinations of min with knives and spiders.  Patient states I can not trust what I see or hear.  Patient denies any SI or HI.  On exam pt is A&Ox3. VSS. Nonfebrile and nontoxic appearing.  Mucous membranes pink and moist. Breath sounds clear bilaterally.  Abdomen soft and nontender. No rebound or guarding appreciated on exam.   BS WNL.  Pt speaking in full sentences.  Steady gait appreciated. Cap refill < 3 seconds.      History Acquisition   Additional history was acquired from other historians.  Chart    The patient's list of active medical problems, social history, medications, and allergies as documented per RN staff has been reviewed.     Differential Diagnoses   Based on available information and the initial assessment, the working differential diagnoses considered during this evaluation include but are not limited to medication reaction, hallucinations, delusions, psychosis, electrolyte abnormality, UTI, others.    I will get labs, pec and reassess.  I discussed this case with my supervising physician.      LABS     Labs Reviewed   CBC W/ AUTO DIFFERENTIAL - Abnormal; Notable for the following components:       Result Value    RBC 2.60 (*)     Hemoglobin 4.1 (*)     Hematocrit 17.3 (*)     MCV 67 (*)     MCH 15.8 (*)     MCHC 23.7 (*)     RDW 20.0 (*)     Platelets 94 (*)     Lymph # 0.9 (*)     nRBC 1 (*)     Platelet Estimate Decreased (*)     All other components within normal limits    Narrative:     HGB and HCT critical result(s) called and verbal readback obtained   from Dr. Melgoza. by JASON 08/01/2023 22:54   COMPREHENSIVE METABOLIC PANEL - Abnormal; Notable for the following components:    CO2 22 (*)     Total Protein 5.9 (*)     Albumin 3.3 (*)     eGFR 54.4 (*)     All other components within normal  limits   URINALYSIS, REFLEX TO URINE CULTURE - Abnormal; Notable for the following components:    Leukocytes, UA Trace (*)     All other components within normal limits    Narrative:     Specimen Source->Urine   ACETAMINOPHEN LEVEL - Abnormal; Notable for the following components:    Acetaminophen (Tylenol), Serum <3.0 (*)     All other components within normal limits   CBC W/ AUTO DIFFERENTIAL - Abnormal; Notable for the following components:    RBC 2.73 (*)     Hemoglobin 4.4 (*)     Hematocrit 18.0 (*)     MCV 66 (*)     MCH 16.1 (*)     MCHC 24.4 (*)     RDW 19.9 (*)     Lymph % 17.9 (*)     All other components within normal limits    Narrative:       HGB AND HCT critical result(s) called and verbal readback obtained   from LASHAY SANCHEZ RN by JONAH 08/02/2023 00:57   IRON AND TIBC - Abnormal; Notable for the following components:    Iron 14 (*)     TIBC 487 (*)     Saturated Iron 3 (*)     All other components within normal limits   VITAMIN B12 - Abnormal; Notable for the following components:    Vitamin B-12 185 (*)     All other components within normal limits   LACTATE DEHYDROGENASE - Abnormal; Notable for the following components:     (*)     All other components within normal limits   CBC W/ AUTO DIFFERENTIAL - Abnormal; Notable for the following components:    RBC 3.28 (*)     Hemoglobin 6.1 (*)     Hematocrit 22.7 (*)     MCV 69 (*)     MCH 18.6 (*)     MCHC 26.9 (*)     RDW 25.1 (*)     nRBC 1 (*)     Lymph % 15.7 (*)     All other components within normal limits   COMPREHENSIVE METABOLIC PANEL - Abnormal; Notable for the following components:    Potassium 3.4 (*)     Albumin 3.4 (*)     All other components within normal limits   ISTAT PROCEDURE - Abnormal; Notable for the following components:    POC Potassium 3.4 (*)     POC TCO2 (MEASURED) 22 (*)     POC Hematocrit 16 (*)     All other components within normal limits   HIV 1 / 2 ANTIBODY    Narrative:     Release to patient->Immediate    HEPATITIS C ANTIBODY    Narrative:     Release to patient->Immediate   TSH   DRUG SCREEN PANEL, URINE EMERGENCY    Narrative:     Specimen Source->Urine   ALCOHOL,MEDICAL (ETHANOL)   FERRITIN   FOLATE   URINALYSIS MICROSCOPIC    Narrative:     Specimen Source->Urine   OCCULT BLOOD X 1, STOOL   LAMOTRIGINE LEVEL   CBC W/ AUTO DIFFERENTIAL   TYPE & SCREEN   ISTAT CHEM8   PREPARE RBC SOFT         Imaging     Imaging Results              X-Ray Foot Complete Right (Final result)  Result time 08/02/23 12:16:16   Procedure changed from X-Ray Foot 2 View Right     Final result by Russell Cleaning MD (08/02/23 12:16:16)                   Impression:      Fracture of the 4th metatarsal      Electronically signed by: Russell Cleaning MD  Date:    08/02/2023  Time:    12:16               Narrative:    EXAMINATION:  XR FOOT COMPLETE 3 VIEW RIGHT    CLINICAL HISTORY:  Fell, now have foot pain;.    TECHNIQUE:  AP, lateral, and oblique views of the right foot were performed.    COMPARISON:  07/11/2017    FINDINGS:  There is acute fracture through the mid 4th metatarsal with some angulation present.  Old healed fracture of the proximal 2nd metatarsal can be seen.  Plantar calcaneal spurs are noted.                                       CT Chest Without Contrast (Final result)  Result time 08/02/23 13:19:06      Final result by Inderjit Lara MD (08/02/23 13:19:06)                   Impression:      Findings consistent with pulmonary edema including interlobular septal thickening, fissural thickening and bilateral pleural effusions.    Solid noncalcified 9 mm nodule in the left lower lobe.  For a solid nodule >8 mm, Fleischner Society 2017 guidelines recommend considering CT, PET/CT or tissue sampling at 3 months.    Calcified material in the esophagus, recommend correlation with possible retention of medications.    Additional findings as above.    Electronically signed by resident: Jc  Jose  Date:    08/02/2023  Time:    11:22    Electronically signed by: Inderjit Lara  Date:    08/02/2023  Time:    13:19               Narrative:    EXAMINATION:  CT CHEST WITHOUT CONTRAST    CLINICAL HISTORY:  Abnormal xray - lung nodule, < 1 cm, low risk;    TECHNIQUE:  Low dose axial images, sagittal and coronal reformations were obtained from the thoracic inlet to the lung bases. Contrast was not administered.    COMPARISON:  CT chest 10/11/2019, chest radiograph 08/02/2023.    CT of the thoracic spine dated 03/17/2020    FINDINGS:  Base of Neck and thoracic soft tissue: Subcentimeter  lymph node measuring 0.9 cm in station 1.  No pathologically enlarged lymph nodes.  No other significant abnormality.    Aorta: Left-sided aortic arch.  No aneurysm.  Mild scattered atherosclerosis.    Heart: Normal size. No effusion.    Pulmonary vasculature: Pulmonary arteries distribute normally.  There are four pulmonary veins.    Christine/Mediastinum: No pathologic bety enlargement.  Subcentimeter lymph nodes in mediastinum.    Lungs/Pleura/airways: Central airways are patent.  There are small bilateral pleural effusions, right greater than left.  Thickening of the right minor fissure with adjacent platelike atelectasis.  There is interlobular septal thickening and diffuse mosaic attenuation.  Tiny calcified granuloma in the right upper lobe.  There are no focal consolidations.  Solid noncalcified nodule measuring 9 x 9 mm begin (mean diameter 9 mm) located in the left lower lobe (axial image 288 series 4).    Esophagus: Rounded hyperdense material in the esophagus, may represent retained medications.    Upper Abdomen: Multiple rounded hepatic hypodensities the largest of which is in the left lobe measuring 3.6 cm.  These are favored to represent hepatic cysts.    Bones: Fracture deformity of multiple posterior left ribs, age indeterminate.  Degenerative changes in the spine, including wedge deformity of T8 and T12  grossly unchanged respect to the CT of the thoracic spine dated 03/17/2020.                                       X-Ray Chest AP Portable (Final result)  Result time 08/02/23 01:38:36      Final result by Jc Shearer MD (08/02/23 01:38:36)                   Impression:      Radiographic findings as above.      Electronically signed by: Jc Shearer  Date:    08/02/2023  Time:    01:38               Narrative:    EXAMINATION:  XR CHEST AP PORTABLE    CLINICAL HISTORY:  post tranfusion pulm edema;    TECHNIQUE:  Single frontal view of the chest was performed.    COMPARISON:  None    FINDINGS:  Single portable chest view is submitted.  The heart size appears upper normal.  Mild aortic atherosclerotic change noted.    There is prominence of the central pulmonary vascular.  There is bilateral pattern of interstitial, ground-glass and patchy alveolar infiltrates, right greater than left.    There is no evidence for pleural effusion and there is no evidence for pneumothorax.    There are remote appearing rib fractures on the left, indeterminate age rib fracture of the posterior left 3rd rib.  The osseous structures otherwise appear intact.                                       CT Head Without Contrast (Final result)  Result time 08/02/23 00:26:08      Final result by Hermila Jimenez MD (08/02/23 00:26:08)                   Impression:      No acute intracranial abnormality or acute fracture.    Interval development of left mastoiditis.    Nasal bone fractures bilaterally appear remote with no overlying soft tissue swelling seen.    Senescent atrophic changes.      Electronically signed by: Hermila Jimenez  Date:    08/02/2023  Time:    00:26               Narrative:    EXAMINATION:  CT HEAD WITHOUT CONTRAST    CLINICAL HISTORY:  Mental status change, unknown cause;    TECHNIQUE:  Low dose axial images were obtained through the head.  Coronal and sagittal reformations were also performed. Contrast was not  administered.    COMPARISON:  09/04/2021 CT brain    FINDINGS:  There is no evidence of acute intra or extra-axial hemorrhage or hematoma.  The gray-white matter junction differentiation is intact with no evidence of acute major arterial infarct.  There is prominence of the ventricles, cisterns and sulci consistent with senescent atrophic changes.  There is no hydrocephalus.    The posterior fossa structures appear unremarkable as imaged.  There is a empty sella configuration of the pituitary gland.    There is no focal mass or mass effect.    There is interval development of fluid in multiple mastoid air cells in the mastoid tip on the left compatible mastoiditis.  The remaining mastoid air cells and middle ears appear clear.  Paranasal sinuses appear clear.  Orbital structures appear symmetric and the bony calvarium appears stable/intact.  There are nasal bone fractures bilaterally mildly depressed on the right.  There is no overlying soft tissue swelling to suggest acute fracture.  Facial bones otherwise are unremarkable.                                      A radiology report was available for my review at the time of the encounter.    EKG        Additional Consideration   All available testing was considered during the course of this workup.  Comorbidities taken into consideration during the patient's evaluation and treatment include weight, age.    Social determinants of health were taken into consideration during development of our treatment plan.    Medications   ARIPiprazole tablet 15 mg (15 mg Oral Given 8/2/23 1134)   atorvastatin tablet 20 mg (20 mg Oral Not Given 8/2/23 1012)   DULoxetine DR capsule 60 mg (60 mg Oral Given 8/2/23 1012)   lamoTRIgine tablet 200 mg (200 mg Oral Given 8/2/23 1012)   metoprolol succinate (TOPROL-XL) 24 hr tablet 100 mg (100 mg Oral Given 8/2/23 1011)   0.9%  NaCl infusion (for blood administration) ( Intravenous New Bag 8/2/23 6459)   hydrALAZINE tablet 25 mg (25 mg Oral  Given 8/2/23 0652)   ferrous sulfate tablet 1 each (1 each Oral Given 8/2/23 1012)   sodium chloride 0.9% flush 10 mL (has no administration in time range)   melatonin tablet 6 mg (has no administration in time range)   ondansetron disintegrating tablet 8 mg (has no administration in time range)   promethazine tablet 25 mg (has no administration in time range)   polyethylene glycol packet 17 g (has no administration in time range)   bisacodyL suppository 10 mg (has no administration in time range)   acetaminophen tablet 650 mg (has no administration in time range)   glucose chewable tablet 16 g (has no administration in time range)   glucose chewable tablet 24 g (has no administration in time range)   glucagon (human recombinant) injection 1 mg (has no administration in time range)   losartan-hydrochlorothiazide 50-12.5 mg per tablet 1 tablet (1 tablet Oral Given 8/2/23 1134)   LORazepam tablet 0.5 mg (has no administration in time range)   gabapentin capsule 200 mg (200 mg Oral Given 8/2/23 1011)   cyanocobalamin tablet 250 mcg (250 mcg Oral Given 8/2/23 1013)   albuterol-ipratropium 2.5 mg-0.5 mg/3 mL nebulizer solution 3 mL (3 mLs Nebulization Given 8/2/23 1316)   albuterol-ipratropium 2.5 mg-0.5 mg/3 mL nebulizer solution 3 mL (has no administration in time range)   furosemide injection 40 mg (40 mg Intravenous Given 8/2/23 0123)   potassium chloride SA CR tablet 40 mEq (40 mEq Oral Given 8/2/23 1134)   methylPREDNISolone sodium succinate injection 125 mg (125 mg Intravenous Given 8/2/23 1352)      ED Course as of 08/02/23 1353   Tue Aug 01, 2023   2215 CBC shows a hemoglobin of 4.1 with hematocrit of 17.3.  Patient has no history of anemia and denies any dark stools.  Unremarkable.  Acetaminophen level negative.  Alcohol within normal limits.  TSH within normal limits. [RZ]   2320 I-STAT shows a hematocrit of 16.  Blood consent signed by patient.  Awaiting CT head and CBC redraw.  Type and screen ordered.  Will  discussed case with hospital medicine to admit for anemia and hallucinations. [RZ]   Wed Aug 02, 2023   0020 Discussed case with hospital medicine.  Will admit to observation.  Blood consent signed.  Awaiting repeat CBC for ordering blood.  Awaiting bed assignment. [RZ]      ED Course User Index  [RZ] Soniya Fam NP            Attending Attestation:     Physician Attestation Statement for NP/PA:   I have directed and reviewed the workup performed by the PA/NP.  I performed the substantive portion of the medical decision making.     Other NP/PA Attestation Additions:      Medical Decision Making: Severe anemia, hallucinations.  Will transfuse and place in observation to .             Critical Care   Date: 08/02/2023  Performed by: Abdelrahman Melgoza MD   Authorized by: Abdelrahman Melgoza MD    Total critical care time (exclusive of procedural time) : 30 minutes  Critical care was necessary to treat or prevent imminent or life-threatening deterioration of the following conditions:  Severe symptomatic anemia requiring emergent transfusion      CLINICAL IMPRESSION  1. Visual hallucinations    2. Auditory hallucinations    3. Anemia, unspecified type    4. Fatigue    5. Chest pain    6. Hypertension         ED Disposition Condition    Observation           This note was created using dictation software.  This program may occasionally mistype words and phrases.         Soniya Fam NP  08/02/23 0021       Abdelrahman Melgoza MD  08/02/23 2323

## 2023-08-02 NOTE — ED TRIAGE NOTES
Elly Harris, a 68 y.o. female presents to the ED w/ complaint of hallucination both visual and auditory. Pt states that she has been hearing a liter of mice behind her rocking chair at home and when she looked behind the chair there was a liter of mice there looking back at her. Pt's daughter told her that there wasn't anything there. Pt denied any homicidal or suicidal ideations at the current moment. 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 Pt has no other complaints.     Triage note:  Chief Complaint   Patient presents with    Hallucinations     Pt c/o recent visual and auditory hallucinations x2 days. States recent medication change to Zyprexa. Denies SI/HI     Review of patient's allergies indicates:   Allergen Reactions    Doxycycline Rash and Hives     Past Medical History:   Diagnosis Date    Anemia     Depression     Fibromyalgia     GERD (gastroesophageal reflux disease)     Herpes zoster infection of thoracic region 2/17/2014    left    Hyperlipidemia     Hypertension     Migraine headache     RLS (restless legs syndrome)

## 2023-08-02 NOTE — HPI
Patient is a 68 y.o. female with medical history of depression, bipolar disorder, fibromyalgia, GERD, hypertension presenting with visual and auditory hallucinations for the past 3 days.  Patient states that 2 weeks ago she was started on Zyprexa for her depression and bipolar disorder.  Patient is unsure whether this is contributing to her recent hallucinations.  Patient states she has been hallucinating spiders, mice and men with knives. Patient reports she was diagnosed with some short of anemia two years ago but was never treated.  Her last blood work was at that time.  Her last colonoscopy was fiver years ago. She complaining of shortness of breath and wheezing with any exertion which have been going on for the past week.  States all her pain are chronic.  No blood in urine.  No increase back pain from back line.        In the ED she was found to have severe microcytic anemia with hh 4.1/17.3 plt of 94.  Denies bleeding.  Denies melena.  Reports severe dyspnea with exertion.

## 2023-08-02 NOTE — H&P
[facility] - Emergency Dept  [facility] Medicine  History & Physical    Patient Name: Elly Harris  MRN: [id number]  Patient Class: OP- Observation  Admission Date: 8/1/2023  Attending Physician: Yi Tapia DO   Primary Care Provider: Srikanth Preston MD         Patient information was obtained from patient and ER records.     Subjective:     Principal Problem:Microcytic anemia    Chief Complaint:   Chief Complaint   Patient presents with    Hallucinations     Pt c/o recent visual and auditory hallucinations x2 days. States recent medication change to Zyprexa. Denies SI/HI        HPI: Patient is a 68 y.o. female with medical history of depression, bipolar disorder, fibromyalgia, GERD, hypertension presenting with visual and auditory hallucinations for the past 3 days.  Patient states that 2 weeks ago she was started on Zyprexa for her depression and bipolar disorder.  Patient is unsure whether this is contributing to her recent hallucinations.  Patient states she has been hallucinating spiders, mice and men with knives. Patient reports she was diagnosed with some short of anemia two years ago but was never treated.  Her last blood work was at that time.  Her last colonoscopy was fiver years ago. She complaining of shortness of breath and wheezing with any exertion which have been going on for the past week.  States all her pain are chronic.  No blood in urine.  No increase back pain from back line.        In the ED she was found to have severe microcytic anemia with hh 4.1/17.3 plt of 94.  Denies bleeding.  Denies melena.  Reports severe dyspnea with exertion.        Past Medical History:   Diagnosis Date    Anemia     Depression     Fibromyalgia     GERD (gastroesophageal reflux disease)     Herpes zoster infection of thoracic region 2/17/2014    left    Hyperlipidemia     Hypertension     Migraine headache     RLS (restless legs syndrome)        Past Surgical History:   Procedure Laterality Date    BILATERAL  SALPINGOOPHORECTOMY       SECTION      CLOSED REDUCTION OF FRACTURE OF NASAL BONE Bilateral 2021    Procedure: CLOSED REDUCTION, FRACTURE, NASAL BONE;  Surgeon: JV Connelly MD;  Location: [facility] OR;  Service: ENT;  Laterality: Bilateral;    CLOSED REDUCTION OF FRACTURE OF NASAL BONE Bilateral 2021    Procedure: CLOSED REDUCTION, FRACTURE, NASAL BONE;  Surgeon: JV Connelly MD;  Location: [facility] OR;  Service: ENT;  Laterality: Bilateral;    DEBRIDEMENT TENNIS ELBOW      FEMUR SURGERY  2013    FOOT NEUROMA SURGERY Bilateral     with hammertoe repair    HYSTERECTOMY      vaginal    LASIK      MANDIBLE FRACTURE SURGERY      TONSILLECTOMY      TOTAL VAGINAL HYSTERECTOMY         Review of patient's allergies indicates:   Allergen Reactions    Doxycycline Rash and Hives       No current facility-administered medications on file prior to encounter.     Current Outpatient Medications on File Prior to Encounter   Medication Sig    acetaminophen (TYLENOL) 500 MG tablet Take 1,000 mg by mouth 2 (two) times daily.     ARIPiprazole (ABILIFY) 15 MG Tab Take 15 mg by mouth once daily.    atorvastatin (LIPITOR) 20 MG tablet Take 1 tablet by mouth once daily.    azelastine (ASTELIN) 137 mcg (0.1 %) nasal spray azelastine 137 mcg (0.1 %) nasal spray aerosol   USE 1 TO 2 SPRAYS IN EACH NOSTRIL TWICE DAILY AS NEEDED FOR CONGESTION    cyclobenzaprine (FLEXERIL) 10 MG tablet Take 1 tablet (10 mg total) by mouth 3 (three) times daily as needed.    DULoxetine (CYMBALTA) 60 MG capsule Take 1 capsule (60 mg total) by mouth once daily.    esomeprazole (NEXIUM) 40 MG capsule Take 1 capsule (40 mg total) by mouth once daily.    FLUoxetine 20 MG capsule     fluticasone propionate (FLONASE) 50 mcg/actuation nasal spray One spray in each nostril twice daily after 1st using azelastine nasal spray    gabapentin (NEURONTIN) 100 MG capsule Take 200 mg by mouth 2 (two) times daily.    lamoTRIgine (LAMICTAL) 200 MG  tablet Take 200 mg by mouth 2 (two) times daily.    losartan-hydrochlorothiazide 50-12.5 mg (HYZAAR) 50-12.5 mg per tablet Take 1 tablet by mouth once daily.    melatonin 3 mg Cap Take by mouth.    metoprolol succinate (TOPROL-XL) 100 MG 24 hr tablet Take 1 tablet by mouth daily.    FLUAD QUAD ,65Y UP,,PF, 60 mcg (15 mcg x 4)/0.5 mL Syrg     lorazepam (ATIVAN) 0.5 MG tablet Take 1 tablet (0.5 mg total) by mouth daily as needed.    sumatriptan (IMITREX) 100 MG tablet TAKE ONE TABLET BY MOUTH AT ONSET, THEN ONE TABLET 2 HOURS LATER FOR EACH EPISODE (Patient not taking: Reported on 2022)    triamcinolone acetonide 0.1% (KENALOG) 0.1 % cream      Family History       Problem Relation (Age of Onset)    Alzheimer's disease Mother    Asbestos Father    COPD Brother    Heart disease Brother    Hyperlipidemia Mother    Hypertension Father    Lymphoma Brother          Tobacco Use    Smoking status: Former     Current packs/day: 0.00     Types: Cigarettes     Quit date: 1979     Years since quittin.6    Smokeless tobacco: Former   Substance and Sexual Activity    Alcohol use: No    Drug use: Never    Sexual activity: Not on file     Review of Systems   Constitutional:  Positive for fatigue.   HENT: Negative.     Eyes: Negative.    Respiratory:  Positive for shortness of breath and wheezing.    Cardiovascular: Negative.    Gastrointestinal: Negative.    Psychiatric/Behavioral:  Positive for hallucinations.      Objective:     Vital Signs (Most Recent):  Temp: 97.3 °F (36.3 °C) (23)  Pulse: 73 (23)  Resp: 18 (23)  BP: (!) 161/70 (23)  SpO2: 99 % (23) Vital Signs (24h Range):  Temp:  [97.3 °F (36.3 °C)] 97.3 °F (36.3 °C)  Pulse:  [73] 73  Resp:  [18] 18  SpO2:  [99 %] 99 %  BP: (161)/(70) 161/70        There is no height or weight on file to calculate BMI.     Physical Exam  Constitutional:       Appearance: Normal appearance. She is obese.   HENT:       Head: Atraumatic.      Right Ear: External ear normal.      Left Ear: External ear normal.      Nose: Nose normal.      Mouth/Throat:      Mouth: Mucous membranes are dry.      Pharynx: Oropharynx is clear. No oropharyngeal exudate or posterior oropharyngeal erythema.   Eyes:      Extraocular Movements: Extraocular movements intact.      Pupils: Pupils are equal, round, and reactive to light.      Comments: Pale conjunctivae   Cardiovascular:      Rate and Rhythm: Normal rate.      Pulses: Normal pulses.      Heart sounds: Murmur heard.   Pulmonary:      Effort: Pulmonary effort is normal.      Breath sounds: Wheezing present.   Abdominal:      Palpations: Abdomen is soft.      Tenderness: There is no abdominal tenderness. There is no guarding.      Hernia: No hernia is present.   Musculoskeletal:         General: No signs of injury. Normal range of motion.      Cervical back: Normal range of motion. No rigidity.      Right lower leg: No edema.      Left lower leg: No edema.   Lymphadenopathy:      Cervical: No cervical adenopathy.   Skin:     General: Skin is warm and dry.      Capillary Refill: Capillary refill takes 2 to 3 seconds.      Coloration: Skin is pale.   Neurological:      General: No focal deficit present.      Mental Status: She is alert and oriented to person, place, and time. Mental status is at baseline.   Psychiatric:         Mood and Affect: Mood normal.              CRANIAL NERVES     CN III, IV, VI   Pupils are equal, round, and reactive to light.       Significant Labs: All pertinent labs within the past 24 hours have been reviewed.  CBC:   Recent Labs   Lab 08/01/23 2210 08/01/23  2315   WBC 4.71  --    HGB 4.1*  --    HCT 17.3* 16*   PLT 94*  --      CMP:   Recent Labs   Lab 08/01/23  2210      K 3.6      CO2 22*   GLU 89   BUN 18   CREATININE 1.1   CALCIUM 8.7   PROT 5.9*   ALBUMIN 3.3*   BILITOT 0.5   ALKPHOS 112   AST 14   ALT 14   ANIONGAP 10       Significant Imaging: I  have reviewed all pertinent imaging results/findings within the past 24 hours.    Assessment/Plan:     * Microcytic anemia  Symptomatic   Repeat HH  Transfuse for hgb less than 7  Anemia work up   Less likely to be from her antipsychotics due to wbc is normal.      Thrombocytopenia  Could be secondary to her medications      Essential hypertension  Continue BB, hold ACEI for now       Bipolar 2 disorder  Restart home medications, check lamotrigine level  Hold Zyprexa  Consider psychiatry consult if hallucination persist        Psychosis  Could be side effect of Zyprexa or secondary to bipolar disorder now with psychosis futures        GERD (gastroesophageal reflux disease)  Restart home PPI      Fibromyalgia  Restart home medications      Depression  Restart home medications        Hyperlipidemia  Restart home medications        VTE Risk Mitigation (From admission, onward)      None               On 08/02/2023, patient should be placed in hospital observation services under my care.        Yi Tapia DO  Department of Hospital Medicine  [facility] - Emergency Dept

## 2023-08-02 NOTE — CONSULTS
"CONSULT[facility] INITIAL EVALUATION    Patient Name: Elly Harris  MRN: [id number]  Patient Class: OP- Observation  Admission Date: 8/1/2023  Attending Physician: Jorge Curry MD      HPI:   Elly Harris is a 68 y.o. female with a past psychiatric history of Bipolar II/depression under the care of Dr Ta and pertinent past medical history of anemia, hypertension, hyperlipidemia, migraines, GERD, and history of falls admitted to the hospital for Symptomatic anemia.    Psychiatry consulted for hallucinations    On psych eval, patient reports auditory and visual hallucinations that have worsened over 2-3 weeks duration. Notes occasional visual hallucinations in the past 2 years. Visual hallucinations are characterized as "black and white bugs", "rats or mice eating behind or underneath her chair", and "spiders". She also reports an event described as a "home invasion" but later notes it may have been a bad dream. Events happen at any time of day; later discussion reveals her hallucinations sometimes occur prior to bedtime. She also report auditory hallucinations described as conversations or "nursery rhymes/holiday songs." During hypomanic episodes she buys more items online, speaks faster, and stays up later in the night; denies symptoms consistent with her hypomanic events.    Collateral with pt's permission: N/A    Medical Review of Systems:  Pertinent items are noted in HPI.    Psychiatric Review of Systems (is pt experiencing or having changes in):  sleep: no  appetite: no  weight: yes, increasing  energy/anergy: yes, last year been feeling low.  interest/pleasure/anhedonia: no  somatic symptoms: no  libido: no  anxiety/panic: yes, anxiety  guilty/worthlessness: no  concentration: yes  Serena: no  Psychosis: no  Trauma: yes  S.I.B.s/risky behavior: no    Past Psychiatric History:  Previous Medication Trials: unclear  Previous Psychiatric Hospitalizations:no   Previous Suicide Attempts: " "no  History of Violence: no  Outpatient Psychiatrist: yes  Family Psychiatric History: yes, daughter with bipolar I, brother with depression  Family History: Mother with Alzheimer's    Substance Use History (w/ emphasis on past 12 months):  Recreational Drugs:  no  Use of Alcohol:  annual drink  Tobacco Use: no; previously, quit in 1979  Rehab History:no    Social History:  Marital Status:   Children: 2  Employment Status/Info: retired, 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  Education:  Associates degree, nursing  Special Ed: no  Housing Status: home with brother and daughter  Access to gun: no  Psychosocial Stressors: family; daughter telling patient she is "kooky" for having hallucinations, patient thinks the daughter is "glad" to have a break from her  Functioning Relationships: good relationship with children, poor support system, parents and grandparents have passed, financially responsible for brother and daughter    Legal History:  Past Charges/Incarcerations: no  Pending charges: no    Mental Status Exam:  General Appearance: appears stated age, well-developed, adequately groomed, lying in bed, overweight  Behavior: normal; cooperative; reasonably friendly, pleasant, and polite; appropriate eye-contact; under good behavioral control  Involuntary Movements and Motor Activity:  when stressed will purse lips, occurs for 15 minutes, 1x per week  Gait and Station: unable to assess - patient lying down or seated  Speech and Language: intact; normal rate, rhythm, volume, tone, and pitch; conversational, spontaneous, and coherent; speaks and understands English proficiently and fluently; repeats words and phrases, no word finding difficulties are noted  Mood: "fine"  Affect: constricted  Thought Process and Associations: intact; linear, goal-directed, organized, and logical; no loosening of associations noted  Thought Content and Perceptions::  hears music (nursery rhyme - grandmother got " ran-over by a raymond), no suicidal ideation, no homicidal ideation, occasionally smells food cooking  Sensorium and Orientation: intact; alert with clear sensorium; oriented fully to person, place, time and situation  Recent and Remote Memory: grossly intact, able to recall relevant and salient information from the recent and remote past  Attention and Concentration: grossly intact, attentive to the conversation and not readily distractible  Fund of Knowledge: grossly intact, used appropriate vocabulary and demonstrated an awareness of current events, consistent with educational level achieved  Insight: good  Judgment: good    CAM ICU positive? no      ASSESSMENT & RECOMMENDATIONS     Bipolar II disorder  Anxiety  Depression    68 year old woman with history of Bipolar II, anxiety, and depression presenting for evaluation of acute exacerbation of chronic, sporadic visual hallucinations. Psychiatric exam without evidence of impaired thought processing, insight, or judgement. Visual hallucinations are not distressing. Serum studies notable for anemia (Hgb 4.4) and B12 deficiency. No brain imaging for review. As patient is not having symptoms consistent with her underlying psychiatric disorder, recommend workup/correction of her underlying medical issues per primary team (anemia, B12 deficiency) and continuing her outpatient psychiatric therapeutic regimen. Advise close follow up with patients outpatient Psychiatrist. Thank you for your consult. We will sign off. Please contact us if you have any additional questions.    PSYCH MEDICATIONS  No changes to current outpatient medication regimen    RISK ASSESSMENT  NO NEED FOR PEC as pt NOT in any imminent danger of hurting self or others and not gravely disabled.     FOLLOW-UP  Will sign off. Pt can follow up with her outpt mental health provider.    DISPOSITION - once medically cleared:   Defer to primary team.    Please contact ON CALL psychiatry service (24/7) for any  acute issues that may arise.      Saúl Salinas MD       --------------------------------------------------------------------------------------------------------------------------------------------------------------------------------------------------------------------------------------    CONTINUED HISTORY & OBJECTIVE clinical data & findings reviewed and noted for above decision making    Past Medical/Surgical History:   Past Medical History:   Diagnosis Date    Anemia     Depression     Fibromyalgia     GERD (gastroesophageal reflux disease)     Herpes zoster infection of thoracic region 2014    left    Hyperlipidemia     Hypertension     Migraine headache     RLS (restless legs syndrome)      Past Surgical History:   Procedure Laterality Date    BILATERAL SALPINGOOPHORECTOMY       SECTION      CLOSED REDUCTION OF FRACTURE OF NASAL BONE Bilateral 2021    Procedure: CLOSED REDUCTION, FRACTURE, NASAL BONE;  Surgeon: JV Connelly MD;  Location: [facility] OR;  Service: ENT;  Laterality: Bilateral;    CLOSED REDUCTION OF FRACTURE OF NASAL BONE Bilateral 2021    Procedure: CLOSED REDUCTION, FRACTURE, NASAL BONE;  Surgeon: JV Connelly MD;  Location: [facility] OR;  Service: ENT;  Laterality: Bilateral;    DEBRIDEMENT TENNIS ELBOW      FEMUR SURGERY  2013    FOOT NEUROMA SURGERY Bilateral     with hammertoe repair    HYSTERECTOMY      vaginal    LASIK      MANDIBLE FRACTURE SURGERY      TONSILLECTOMY      TOTAL VAGINAL HYSTERECTOMY         Current Medications:   Scheduled Meds:    ARIPiprazole  15 mg Oral Daily    atorvastatin  20 mg Oral Daily    cyanocobalamin  250 mcg Oral Daily    DULoxetine  60 mg Oral Daily    ferrous sulfate  1 tablet Oral Daily    gabapentin  200 mg Oral BID    lamoTRIgine  200 mg Oral BID    losartan-hydrochlorothiazide 50-12.5 mg  1 tablet Oral Daily    metoprolol succinate  100 mg Oral Daily     PRN Meds: 0.9%  NaCl infusion (for blood administration),  acetaminophen, bisacodyL, glucagon (human recombinant), glucose, glucose, hydrALAZINE, LORazepam, melatonin, ondansetron, polyethylene glycol, promethazine, sodium chloride 0.9%    Allergies:   Review of patient's allergies indicates:   Allergen Reactions    Doxycycline Rash and Hives       Vitals  Vitals:    08/02/23 0830   BP: (!) 180/77   Pulse: 81   Resp: 20   Temp:        Labs/Imaging/Studies:  Recent Results (from the past 24 hour(s))   HIV 1/2 Ag/Ab (4th Gen)    Collection Time: 08/01/23 10:10 PM   Result Value Ref Range    HIV 1/2 Ag/Ab Non-reactive Non-reactive   Hepatitis C Antibody    Collection Time: 08/01/23 10:10 PM   Result Value Ref Range    Hepatitis C Ab Non-reactive Non-reactive   CBC auto differential    Collection Time: 08/01/23 10:10 PM   Result Value Ref Range    WBC 4.71 3.90 - 12.70 K/uL    RBC 2.60 (L) 4.00 - 5.40 M/uL    Hemoglobin 4.1 (LL) 12.0 - 16.0 g/dL    Hematocrit 17.3 (LL) 37.0 - 48.5 %    MCV 67 (L) 82 - 98 fL    MCH 15.8 (L) 27.0 - 31.0 pg    MCHC 23.7 (L) 32.0 - 36.0 g/dL    RDW 20.0 (H) 11.5 - 14.5 %    Platelets 94 (L) 150 - 450 K/uL    MPV SEE COMMENT 9.2 - 12.9 fL    Immature Granulocytes 0.4 0.0 - 0.5 %    Gran # (ANC) 3.1 1.8 - 7.7 K/uL    Immature Grans (Abs) 0.02 0.00 - 0.04 K/uL    Lymph # 0.9 (L) 1.0 - 4.8 K/uL    Mono # 0.4 0.3 - 1.0 K/uL    Eos # 0.2 0.0 - 0.5 K/uL    Baso # 0.03 0.00 - 0.20 K/uL    nRBC 1 (A) 0 /100 WBC    Gran % 66.6 38.0 - 73.0 %    Lymph % 19.7 18.0 - 48.0 %    Mono % 8.7 4.0 - 15.0 %    Eosinophil % 4.0 0.0 - 8.0 %    Basophil % 0.6 0.0 - 1.9 %    Platelet Estimate Decreased (A)     Aniso Slight     Poik Slight     Poly Occasional     Greenbrier Cells Occasional     Basophilic Stippling Occasional     Differential Method Automated    Comprehensive metabolic panel    Collection Time: 08/01/23 10:10 PM   Result Value Ref Range    Sodium 141 136 - 145 mmol/L    Potassium 3.6 3.5 - 5.1 mmol/L    Chloride 109 95 - 110 mmol/L    CO2 22 (L) 23 - 29 mmol/L     Glucose 89 70 - 110 mg/dL    BUN 18 8 - 23 mg/dL    Creatinine 1.1 0.5 - 1.4 mg/dL    Calcium 8.7 8.7 - 10.5 mg/dL    Total Protein 5.9 (L) 6.0 - 8.4 g/dL    Albumin 3.3 (L) 3.5 - 5.2 g/dL    Total Bilirubin 0.5 0.1 - 1.0 mg/dL    Alkaline Phosphatase 112 55 - 135 U/L    AST 14 10 - 40 U/L    ALT 14 10 - 44 U/L    eGFR 54.4 (A) >60 mL/min/1.73 m^2    Anion Gap 10 8 - 16 mmol/L   TSH    Collection Time: 08/01/23 10:10 PM   Result Value Ref Range    TSH 2.507 0.400 - 4.000 uIU/mL   Ethanol    Collection Time: 08/01/23 10:10 PM   Result Value Ref Range    Alcohol, Serum <10 <10 mg/dL   Acetaminophen level    Collection Time: 08/01/23 10:10 PM   Result Value Ref Range    Acetaminophen (Tylenol), Serum <3.0 (L) 10.0 - 20.0 ug/mL   CBC auto differential    Collection Time: 08/01/23 11:09 PM   Result Value Ref Range    WBC 6.64 3.90 - 12.70 K/uL    RBC 2.73 (L) 4.00 - 5.40 M/uL    Hemoglobin 4.4 (LL) 12.0 - 16.0 g/dL    Hematocrit 18.0 (LL) 37.0 - 48.5 %    MCV 66 (L) 82 - 98 fL    MCH 16.1 (L) 27.0 - 31.0 pg    MCHC 24.4 (L) 32.0 - 36.0 g/dL    RDW 19.9 (H) 11.5 - 14.5 %    Platelets 191 150 - 450 K/uL    MPV SEE COMMENT 9.2 - 12.9 fL    Immature Granulocytes 0.2 0.0 - 0.5 %    Gran # (ANC) 4.5 1.8 - 7.7 K/uL    Immature Grans (Abs) 0.01 0.00 - 0.04 K/uL    Lymph # 1.2 1.0 - 4.8 K/uL    Mono # 0.6 0.3 - 1.0 K/uL    Eos # 0.3 0.0 - 0.5 K/uL    Baso # 0.03 0.00 - 0.20 K/uL    nRBC 0 0 /100 WBC    Gran % 67.1 38.0 - 73.0 %    Lymph % 17.9 (L) 18.0 - 48.0 %    Mono % 9.6 4.0 - 15.0 %    Eosinophil % 4.7 0.0 - 8.0 %    Basophil % 0.5 0.0 - 1.9 %    Differential Method Automated    Type & Screen    Collection Time: 08/01/23 11:09 PM   Result Value Ref Range    Group & Rh O POS     Indirect Jennifer NEG     Specimen Outdate 08/04/2023 23:59    Prepare RBC 3 Units; symptomic anemia    Collection Time: 08/01/23 11:09 PM   Result Value Ref Range    UNIT NUMBER [id number]     Product Code E2807O20     DISPENSE STATUS ISSUED      CODING SYSTEM ZECP453     Unit Blood Type Code 5100     Unit Blood Type O POS     Unit Expiration [id number]     CROSSMATCH INTERPRETATION Compatible     UNIT NUMBER [id number]     Product Code Q8362Y50     DISPENSE STATUS CROSSMATCHED     CODING SYSTEM [id number]     Unit Blood Type Code 5100     Unit Blood Type O POS     Unit Expiration [id number]     CROSSMATCH INTERPRETATION Compatible     UNIT NUMBER [id number]     Product Code Z8113P13     DISPENSE STATUS CROSSMATCHED     CODING SYSTEM [id number]     Unit Blood Type Code 5100     Unit Blood Type O POS     Unit Expiration [id number]     CROSSMATCH INTERPRETATION Compatible    ISTAT PROCEDURE    Collection Time: 08/01/23 11:15 PM   Result Value Ref Range    POC Glucose 84 70 - 110 mg/dL    POC BUN 17 6 - 30 mg/dL    POC Creatinine 1.2 0.5 - 1.4 mg/dL    POC Sodium 139 136 - 145 mmol/L    POC Potassium 3.4 (L) 3.5 - 5.1 mmol/L    POC Chloride 108 95 - 110 mmol/L    POC TCO2 (MEASURED) 22 (L) 23 - 29 mmol/L    POC Ionized Calcium 1.06 1.06 - 1.42 mmol/L    POC Hematocrit 16 (LL) 36 - 54 %PCV    Sample MICHELLE    Urinalysis, Reflex to Urine Culture Urine, Clean Catch    Collection Time: 08/02/23 12:09 AM    Specimen: Urine   Result Value Ref Range    Specimen UA Urine, Clean Catch     Color, UA Straw Yellow, Straw, Myrtle    Appearance, UA Clear Clear    pH, UA 6.0 5.0 - 8.0    Specific Gravity, UA 1.010 1.005 - 1.030    Protein, UA Negative Negative    Glucose, UA Negative Negative    Ketones, UA Negative Negative    Bilirubin (UA) Negative Negative    Occult Blood UA Negative Negative    Nitrite, UA Negative Negative    Leukocytes, UA Trace (A) Negative   Drug screen panel, emergency    Collection Time: 08/02/23 12:09 AM   Result Value Ref Range    Benzodiazepines Negative Negative    Methadone metabolites Negative Negative    Cocaine (Metab.) Negative Negative    Opiate Scrn, Ur Negative Negative    Barbiturate Screen, Ur Negative Negative    Amphetamine  Screen, Ur Negative Negative    THC Negative Negative    Phencyclidine Negative Negative    Creatinine, Urine 23.0 15.0 - 325.0 mg/dL    Toxicology Information SEE COMMENT    Urinalysis Microscopic    Collection Time: 08/02/23 12:09 AM   Result Value Ref Range    RBC, UA 2 0 - 4 /hpf    WBC, UA 3 0 - 5 /hpf    Bacteria Rare None-Occ /hpf    Squam Epithel, UA 2 /hpf    Microscopic Comment SEE COMMENT    Iron and TIBC    Collection Time: 08/02/23  1:21 AM   Result Value Ref Range    Iron 14 (L) 30 - 160 ug/dL    Transferrin 329 200 - 375 mg/dL    TIBC 487 (H) 250 - 450 ug/dL    Saturated Iron 3 (L) 20 - 50 %   Ferritin    Collection Time: 08/02/23  1:21 AM   Result Value Ref Range    Ferritin 22 20.0 - 300.0 ng/mL   Vitamin B12    Collection Time: 08/02/23  1:21 AM   Result Value Ref Range    Vitamin B-12 185 (L) 210 - 950 pg/mL   Folate    Collection Time: 08/02/23  1:21 AM   Result Value Ref Range    Folate 16.1 4.0 - 24.0 ng/mL   Lactate dehydrogenase    Collection Time: 08/02/23  1:21 AM   Result Value Ref Range     (H) 110 - 260 U/L     Imaging Results              X-Ray Chest AP Portable (Final result)  Result time 08/02/23 01:38:36      Final result by Jc Shearer MD (08/02/23 01:38:36)                   Impression:      Radiographic findings as above.      Electronically signed by: Jc Shearer  Date:    08/02/2023  Time:    01:38               Narrative:    EXAMINATION:  XR CHEST AP PORTABLE    CLINICAL HISTORY:  post tranfusion pulm edema;    TECHNIQUE:  Single frontal view of the chest was performed.    COMPARISON:  None    FINDINGS:  Single portable chest view is submitted.  The heart size appears upper normal.  Mild aortic atherosclerotic change noted.    There is prominence of the central pulmonary vascular.  There is bilateral pattern of interstitial, ground-glass and patchy alveolar infiltrates, right greater than left.    There is no evidence for pleural effusion and there is no  evidence for pneumothorax.    There are remote appearing rib fractures on the left, indeterminate age rib fracture of the posterior left 3rd rib.  The osseous structures otherwise appear intact.                                       CT Head Without Contrast (Final result)  Result time 08/02/23 00:26:08      Final result by Hermila Jimenez MD (08/02/23 00:26:08)                   Impression:      No acute intracranial abnormality or acute fracture.    Interval development of left mastoiditis.    Nasal bone fractures bilaterally appear remote with no overlying soft tissue swelling seen.    Senescent atrophic changes.      Electronically signed by: Hermila Jimenez  Date:    08/02/2023  Time:    00:26               Narrative:    EXAMINATION:  CT HEAD WITHOUT CONTRAST    CLINICAL HISTORY:  Mental status change, unknown cause;    TECHNIQUE:  Low dose axial images were obtained through the head.  Coronal and sagittal reformations were also performed. Contrast was not administered.    COMPARISON:  09/04/2021 CT brain    FINDINGS:  There is no evidence of acute intra or extra-axial hemorrhage or hematoma.  The gray-white matter junction differentiation is intact with no evidence of acute major arterial infarct.  There is prominence of the ventricles, cisterns and sulci consistent with senescent atrophic changes.  There is no hydrocephalus.    The posterior fossa structures appear unremarkable as imaged.  There is a empty sella configuration of the pituitary gland.    There is no focal mass or mass effect.    There is interval development of fluid in multiple mastoid air cells in the mastoid tip on the left compatible mastoiditis.  The remaining mastoid air cells and middle ears appear clear.  Paranasal sinuses appear clear.  Orbital structures appear symmetric and the bony calvarium appears stable/intact.  There are nasal bone fractures bilaterally mildly depressed on the right.  There is no overlying soft tissue  swelling to suggest acute fracture.  Facial bones otherwise are unremarkable.

## 2023-08-02 NOTE — CONSULTS
[facility] - Emergency Dept  Orthopedics  Consult Note    Patient Name: Elly Harris  MRN: [id number]  Admission Date: 2023  Hospital Length of Stay: 0 days  Attending Provider: Jorge Curry MD  Primary Care Provider: Srikanth Preston MD    Patient information was obtained from patient and ER records.     Inpatient consult to Orthopedics  Consult performed by: CHARISSE White MD  Consult ordered by: Joan Mcintosh PA-C        Subjective:     Principal Problem:Symptomatic anemia    Chief Complaint:   Chief Complaint   Patient presents with    Hallucinations     Pt c/o recent visual and auditory hallucinations x2 days. States recent medication change to Zyprexa. Denies SI/HI        HPI: Elly Harris is a 68 y.o. female with a PMH of bipolar disorder, fibromyalgia, GERD, hypertension, who presents with right foot pain.  Patient experienced a mechanical fall from standing onto her right side while ambulating to the bathroom today.  States that she felt a pop in her foot when she fell and experienced immediate pain and inability to bear weight.  She denies hitting her head or loss of consciousness.  Endorses some pain at her right lower leg but otherwise denies any other musculoskeletal pains.  Also denies any numbness, tingling, or weakness in her right lower extremity.  She ambulates without assistance at baseline and is currently admitted for symptomatic anemia.  Orthopedics was consulted for evaluation of an acute 4th metatarsal fracture.      Past Medical History:   Diagnosis Date    Anemia     Depression     Fibromyalgia     GERD (gastroesophageal reflux disease)     Herpes zoster infection of thoracic region 2014    left    Hyperlipidemia     Hypertension     Migraine headache     RLS (restless legs syndrome)        Past Surgical History:   Procedure Laterality Date    BILATERAL SALPINGOOPHORECTOMY       SECTION      CLOSED REDUCTION OF FRACTURE OF NASAL BONE Bilateral 2021     Procedure: CLOSED REDUCTION, FRACTURE, NASAL BONE;  Surgeon: JV Connelly MD;  Location: [facility] OR;  Service: ENT;  Laterality: Bilateral;    CLOSED REDUCTION OF FRACTURE OF NASAL BONE Bilateral 12/13/2021    Procedure: CLOSED REDUCTION, FRACTURE, NASAL BONE;  Surgeon: JV Connelly MD;  Location: [facility] OR;  Service: ENT;  Laterality: Bilateral;    DEBRIDEMENT TENNIS ELBOW      FEMUR SURGERY  04/16/2013    FOOT NEUROMA SURGERY Bilateral     with hammertoe repair    HYSTERECTOMY      vaginal    LASIK  2003    MANDIBLE FRACTURE SURGERY      TONSILLECTOMY      TOTAL VAGINAL HYSTERECTOMY         Review of patient's allergies indicates:   Allergen Reactions    Doxycycline Rash and Hives       Current Facility-Administered Medications   Medication    0.9%  NaCl infusion (for blood administration)    acetaminophen tablet 650 mg    albuterol-ipratropium 2.5 mg-0.5 mg/3 mL nebulizer solution 3 mL    albuterol-ipratropium 2.5 mg-0.5 mg/3 mL nebulizer solution 3 mL    ARIPiprazole tablet 15 mg    atorvastatin tablet 20 mg    bisacodyL suppository 10 mg    cyanocobalamin tablet 250 mcg    DULoxetine DR capsule 60 mg    ferrous sulfate tablet 1 each    gabapentin capsule 200 mg    glucagon (human recombinant) injection 1 mg    glucose chewable tablet 16 g    glucose chewable tablet 24 g    hydrALAZINE tablet 25 mg    lamoTRIgine tablet 200 mg    LORazepam tablet 0.5 mg    losartan-hydrochlorothiazide 50-12.5 mg per tablet 1 tablet    melatonin tablet 6 mg    metoprolol succinate (TOPROL-XL) 24 hr tablet 100 mg    ondansetron disintegrating tablet 8 mg    polyethylene glycol packet 17 g    promethazine tablet 25 mg    sodium chloride 0.9% flush 10 mL     Current Outpatient Medications   Medication Sig    acetaminophen (TYLENOL) 500 MG tablet Take 1,000 mg by mouth 2 (two) times daily as needed for Pain.    ARIPiprazole (ABILIFY) 15 MG Tab Take 15 mg by mouth once daily.    atorvastatin (LIPITOR) 20 MG tablet Take 1 tablet by  mouth once daily.    cyclobenzaprine (FLEXERIL) 10 MG tablet Take 1 tablet (10 mg total) by mouth 3 (three) times daily as needed. (Patient taking differently: Take 10 mg by mouth 3 (three) times daily as needed for Muscle spasms.)    DULoxetine (CYMBALTA) 30 MG capsule Take 30 mg by mouth every evening. (60MG QAM & 30MG QPM)    DULoxetine (CYMBALTA) 60 MG capsule Take 1 capsule (60 mg total) by mouth once daily. (Patient taking differently: Take 60 mg by mouth every morning. (60MG QAM & 30MG QPM))    esomeprazole (NEXIUM) 40 MG capsule Take 1 capsule (40 mg total) by mouth once daily.    FLUoxetine 20 MG capsule Take 20 mg by mouth once daily.    lamoTRIgine (LAMICTAL) 200 MG tablet Take 200 mg by mouth 2 (two) times daily.    lorazepam (ATIVAN) 0.5 MG tablet Take 1 tablet (0.5 mg total) by mouth daily as needed. (Patient taking differently: Take 0.5 mg by mouth daily as needed for Anxiety.)    melatonin (MELATIN) 5 mg Take 10 mg by mouth nightly as needed (sleep).    metoprolol succinate (TOPROL-XL) 100 MG 24 hr tablet Take 1 tablet by mouth daily.    naproxen (NAPROSYN) 500 MG tablet Take 500 mg by mouth 2 (two) times daily with meals.    OLANZapine (ZYPREXA) 5 MG tablet Take 5 mg by mouth once daily.    sumatriptan (IMITREX) 100 MG tablet TAKE ONE TABLET BY MOUTH AT ONSET, THEN ONE TABLET 2 HOURS LATER FOR EACH EPISODE    buPROPion (WELLBUTRIN XL) 150 MG TB24 tablet Take 150 mg by mouth once daily.    triamcinolone acetonide 0.1% (KENALOG) 0.1 % cream      Family History       Problem Relation (Age of Onset)    Alzheimer's disease Mother    Asbestos Father    COPD Brother    Heart disease Brother    Hyperlipidemia Mother    Hypertension Father    Lymphoma Brother          Tobacco Use    Smoking status: Former     Current packs/day: 0.00     Types: Cigarettes     Quit date: 1979     Years since quittin.6    Smokeless tobacco: Former   Substance and Sexual Activity    Alcohol use: No    Drug use: Never     "Sexual activity: Not on file     ROS  Constitutional: Denies fever/chills  Eyes: Denies change in vision  ENT: Denies sore throat or rhinorrhea   Respiratory: Denies shortness of breath or cough  Cardiovascular: Denies chest pain or palpitations  Gastrointestinal: Denies abdominal pain, nausea, or vomiting  Genitourinary: Denies dysuria and flank pain  Skin: Denies new rash or skin lesions   Allergic/Immunologic: Denies adverse reactions to current medications  Neurological: Denies headaches or dizziness  Musculoskeletal: see HPI    Objective:     Vital Signs (Most Recent):  Temp: 98 °F (36.7 °C) (08/02/23 1519)  Pulse: 76 (08/02/23 1519)  Resp: 20 (08/02/23 1519)  BP: (!) 188/81 (08/02/23 1519)  SpO2: 100 % (08/02/23 1519) Vital Signs (24h Range):  Temp:  [97.3 °F (36.3 °C)-98.8 °F (37.1 °C)] 98 °F (36.7 °C)  Pulse:  [64-81] 76  Resp:  [16-22] 20  SpO2:  [95 %-100 %] 100 %  BP: (138-209)/() 188/81     Weight: 78.5 kg (173 lb)  Height: 5' 7" (170.2 cm)  Body mass index is 27.1 kg/m².      Intake/Output Summary (Last 24 hours) at 8/2/2023 1527  Last data filed at 8/2/2023 1518  Gross per 24 hour   Intake 1010.42 ml   Output 700 ml   Net 310.42 ml        Ortho/SPM Exam  Physical Exam:  General:  no apparent distress, WDWN  HENT:  NCAT, Bilateral ears/eyes normal  CV:  Normal pulses, color, and cap refill  Lungs:  Normal respiratory effort  Neuro: No FND, awake, alert  Psych:  Oriented to Person, Place, Time, and Situation    MSK:       LUE:  Inspection: Skin intact throughout, no swelling, no effusions, no ecchymosis   Palpation: Non-TTP throughout, no palpable abnormality.   ROM: AROM and PROM of the shoulder, elbow, wrist, and hand intact without pain.   Neuro: AIN/PIN/Radial/Median/Ulnar Nerves assessed in isolation without deficit.   SILT throughout.    Vascular: Radial artery palpated 2+. Capillary refill <3s.         RUE:  Inspection: Skin intact throughout, no swelling, no effusions, no ecchymosis "   Palpation: Non-TTP throughout, no palpable abnormality.   ROM: AROM and PROM of the shoulder, elbow, wrist, and hand intact without pain.   Neuro: AIN/PIN/Radial/Median/Ulnar Nerves assessed in isolation without deficit.   SILT throughout.    Vascular: Radial artery palpated 2+. Capillary refill <3s.         LLE:  Inspection: Skin intact throughout, no swelling, no effusions, no ecchymosis   Palpation: Non-TTP throughout. No palpable abnormality.   ROM: AROM and PROM of the hip, knee, ankle, and foot intact without pain.   Neuro: TA/EHL/Gastroc/FHL assessed in isolation without deficit.   SILT throughout.    Vascular: Foot is WWP. Capillary refill <3s.         RLE:  Inspection: Skin intact throughout, no open wounds  No swelling or ecchymosis noted along the dorsum or plantar aspect of the foot   Palpation: Mild tenderness to palpation along the plantar aspect of the lateral foot; otherwise non-TTP throughout.  No palpable abnormality  Compartments are soft and compressible   ROM: AROM and PROM of the hip, knee, ankle intact without pain.  Range of motion at the toes limited secondary to pain   Neuro: TA/Gastroc/EHL/FHL assessed in isolation without deficit.   SILT Sa/Curran/DP/SP/T nerve distributions   Vascular: DP and PT arteries palpated 2+. Capillary refill <3s.           Significant Labs: CBC:   Recent Labs   Lab 08/01/23  2309 08/01/23  2315 08/02/23  0905 08/02/23  1352   WBC 6.64  --  7.31 9.16   HGB 4.4*  --  6.1* 7.7*   HCT 18.0* 16* 22.7* 28.9*     --  187 179     All pertinent labs within the past 24 hours have been reviewed.    Significant Imaging: I have reviewed and interpreted all pertinent imaging results/findings.  X-ray of the right foot showing an angulated fracture of the 4th metatarsal with no sagittal displacement; also shown is a healed     Assessment/Plan:     Closed displaced fracture of fourth metatarsal bone of right foot  Elly Harris is a 68 y.o. female with a PMH of bipolar  disorder, fibromyalgia, GERD, hypertension, who presents with a right 4th metatarsal shaft fracture.  She is closed, neurovascularly intact.  On presentation, patient was noted to have a hemoglobin of 4.4 in addition to reports of 3 days of auditory and visual hallucinations; she is currently admitted for symptomatic anemia.      Recommend patient remain weight-bearing as tolerated in a hard-sole postop shoe with rocker bottom along with a multimodal pain regimen.  Daily physical therapy; allow patient to ambulate with normal shoe on left foot to avoid length-discrepancy when up and walking. We will have her follow-up in outpatient trauma clinic (patient will be contacted with scheduling information).        CHARISSE White MD  Orthopedics  [facility] - Emergency Dept

## 2023-08-02 NOTE — ASSESSMENT & PLAN NOTE
Elly Harris is a 68 y.o. female with a PMH of bipolar disorder, fibromyalgia, GERD, hypertension, who presents with a right 4th metatarsal shaft fracture.  She is closed, neurovascularly intact.  On presentation, patient was noted to have a hemoglobin of 4.4 in addition to reports of 3 days of auditory and visual hallucinations; she is currently admitted for symptomatic anemia.      Recommend patient remain weight-bearing as tolerated in a hard-sole postop shoe with rocker bottom along with a multimodal pain regimen.  Daily physical therapy; allow patient to ambulate with normal shoe on left foot to avoid length-discrepancy when up and walking. We will have her follow-up in outpatient trauma clinic (patient will be contacted with scheduling information).

## 2023-08-02 NOTE — SUBJECTIVE & OBJECTIVE
Past Medical History:   Diagnosis Date    Anemia     Depression     Fibromyalgia     GERD (gastroesophageal reflux disease)     Herpes zoster infection of thoracic region 2014    left    Hyperlipidemia     Hypertension     Migraine headache     RLS (restless legs syndrome)        Past Surgical History:   Procedure Laterality Date    BILATERAL SALPINGOOPHORECTOMY       SECTION      CLOSED REDUCTION OF FRACTURE OF NASAL BONE Bilateral 2021    Procedure: CLOSED REDUCTION, FRACTURE, NASAL BONE;  Surgeon: JV Connelly MD;  Location: [facility] OR;  Service: ENT;  Laterality: Bilateral;    CLOSED REDUCTION OF FRACTURE OF NASAL BONE Bilateral 2021    Procedure: CLOSED REDUCTION, FRACTURE, NASAL BONE;  Surgeon: JV Connelly MD;  Location: [facility] OR;  Service: ENT;  Laterality: Bilateral;    DEBRIDEMENT TENNIS ELBOW      FEMUR SURGERY  2013    FOOT NEUROMA SURGERY Bilateral     with hammertoe repair    HYSTERECTOMY      vaginal    LASIK      MANDIBLE FRACTURE SURGERY      TONSILLECTOMY      TOTAL VAGINAL HYSTERECTOMY         Review of patient's allergies indicates:   Allergen Reactions    Doxycycline Rash and Hives       No current facility-administered medications on file prior to encounter.     Current Outpatient Medications on File Prior to Encounter   Medication Sig    acetaminophen (TYLENOL) 500 MG tablet Take 1,000 mg by mouth 2 (two) times daily.     ARIPiprazole (ABILIFY) 15 MG Tab Take 15 mg by mouth once daily.    atorvastatin (LIPITOR) 20 MG tablet Take 1 tablet by mouth once daily.    azelastine (ASTELIN) 137 mcg (0.1 %) nasal spray azelastine 137 mcg (0.1 %) nasal spray aerosol   USE 1 TO 2 SPRAYS IN EACH NOSTRIL TWICE DAILY AS NEEDED FOR CONGESTION    cyclobenzaprine (FLEXERIL) 10 MG tablet Take 1 tablet (10 mg total) by mouth 3 (three) times daily as needed.    DULoxetine (CYMBALTA) 60 MG capsule Take 1 capsule (60 mg total) by mouth once daily.    esomeprazole (NEXIUM) 40 MG  capsule Take 1 capsule (40 mg total) by mouth once daily.    FLUoxetine 20 MG capsule     fluticasone propionate (FLONASE) 50 mcg/actuation nasal spray One spray in each nostril twice daily after 1st using azelastine nasal spray    gabapentin (NEURONTIN) 100 MG capsule Take 200 mg by mouth 2 (two) times daily.    lamoTRIgine (LAMICTAL) 200 MG tablet Take 200 mg by mouth 2 (two) times daily.    losartan-hydrochlorothiazide 50-12.5 mg (HYZAAR) 50-12.5 mg per tablet Take 1 tablet by mouth once daily.    melatonin 3 mg Cap Take by mouth.    metoprolol succinate (TOPROL-XL) 100 MG 24 hr tablet Take 1 tablet by mouth daily.    FLUAD QUAD ,65Y UP,,PF, 60 mcg (15 mcg x 4)/0.5 mL Syrg     lorazepam (ATIVAN) 0.5 MG tablet Take 1 tablet (0.5 mg total) by mouth daily as needed.    sumatriptan (IMITREX) 100 MG tablet TAKE ONE TABLET BY MOUTH AT ONSET, THEN ONE TABLET 2 HOURS LATER FOR EACH EPISODE (Patient not taking: Reported on 2022)    triamcinolone acetonide 0.1% (KENALOG) 0.1 % cream      Family History       Problem Relation (Age of Onset)    Alzheimer's disease Mother    Asbestos Father    COPD Brother    Heart disease Brother    Hyperlipidemia Mother    Hypertension Father    Lymphoma Brother          Tobacco Use    Smoking status: Former     Current packs/day: 0.00     Types: Cigarettes     Quit date: 1979     Years since quittin.6    Smokeless tobacco: Former   Substance and Sexual Activity    Alcohol use: No    Drug use: Never    Sexual activity: Not on file     Review of Systems   Constitutional:  Positive for fatigue.   HENT: Negative.     Eyes: Negative.    Respiratory:  Positive for shortness of breath and wheezing.    Cardiovascular: Negative.    Gastrointestinal: Negative.    Psychiatric/Behavioral:  Positive for hallucinations.      Objective:     Vital Signs (Most Recent):  Temp: 97.3 °F (36.3 °C) (23)  Pulse: 73 (23)  Resp: 18 (23)  BP: (!) 161/70  (08/01/23 1913)  SpO2: 99 % (08/01/23 1913) Vital Signs (24h Range):  Temp:  [97.3 °F (36.3 °C)] 97.3 °F (36.3 °C)  Pulse:  [73] 73  Resp:  [18] 18  SpO2:  [99 %] 99 %  BP: (161)/(70) 161/70        There is no height or weight on file to calculate BMI.     Physical Exam  Constitutional:       Appearance: Normal appearance. She is obese.   HENT:      Head: Atraumatic.      Right Ear: External ear normal.      Left Ear: External ear normal.      Nose: Nose normal.      Mouth/Throat:      Mouth: Mucous membranes are dry.      Pharynx: Oropharynx is clear. No oropharyngeal exudate or posterior oropharyngeal erythema.   Eyes:      Extraocular Movements: Extraocular movements intact.      Pupils: Pupils are equal, round, and reactive to light.      Comments: Pale conjunctivae   Cardiovascular:      Rate and Rhythm: Normal rate.      Pulses: Normal pulses.      Heart sounds: Murmur heard.   Pulmonary:      Effort: Pulmonary effort is normal.      Breath sounds: Wheezing present.   Abdominal:      Palpations: Abdomen is soft.      Tenderness: There is no abdominal tenderness. There is no guarding.      Hernia: No hernia is present.   Musculoskeletal:         General: No signs of injury. Normal range of motion.      Cervical back: Normal range of motion. No rigidity.      Right lower leg: No edema.      Left lower leg: No edema.   Lymphadenopathy:      Cervical: No cervical adenopathy.   Skin:     General: Skin is warm and dry.      Capillary Refill: Capillary refill takes 2 to 3 seconds.      Coloration: Skin is pale.   Neurological:      General: No focal deficit present.      Mental Status: She is alert and oriented to person, place, and time. Mental status is at baseline.   Psychiatric:         Mood and Affect: Mood normal.              CRANIAL NERVES     CN III, IV, VI   Pupils are equal, round, and reactive to light.       Significant Labs: All pertinent labs within the past 24 hours have been reviewed.  CBC:   Recent  Labs   Lab 08/01/23  2210 08/01/23  2315   WBC 4.71  --    HGB 4.1*  --    HCT 17.3* 16*   PLT 94*  --      CMP:   Recent Labs   Lab 08/01/23  2210      K 3.6      CO2 22*   GLU 89   BUN 18   CREATININE 1.1   CALCIUM 8.7   PROT 5.9*   ALBUMIN 3.3*   BILITOT 0.5   ALKPHOS 112   AST 14   ALT 14   ANIONGAP 10       Significant Imaging: I have reviewed all pertinent imaging results/findings within the past 24 hours.

## 2023-08-02 NOTE — SUBJECTIVE & OBJECTIVE
Past Medical History:   Diagnosis Date    Anemia     Depression     Fibromyalgia     GERD (gastroesophageal reflux disease)     Herpes zoster infection of thoracic region 2014    left    Hyperlipidemia     Hypertension     Migraine headache     RLS (restless legs syndrome)        Past Surgical History:   Procedure Laterality Date    BILATERAL SALPINGOOPHORECTOMY       SECTION      CLOSED REDUCTION OF FRACTURE OF NASAL BONE Bilateral 2021    Procedure: CLOSED REDUCTION, FRACTURE, NASAL BONE;  Surgeon: JV Connelly MD;  Location: [facility] OR;  Service: ENT;  Laterality: Bilateral;    CLOSED REDUCTION OF FRACTURE OF NASAL BONE Bilateral 2021    Procedure: CLOSED REDUCTION, FRACTURE, NASAL BONE;  Surgeon: JV Connelly MD;  Location: [facility] OR;  Service: ENT;  Laterality: Bilateral;    DEBRIDEMENT TENNIS ELBOW      FEMUR SURGERY  2013    FOOT NEUROMA SURGERY Bilateral     with hammertoe repair    HYSTERECTOMY      vaginal    LASIK      MANDIBLE FRACTURE SURGERY      TONSILLECTOMY      TOTAL VAGINAL HYSTERECTOMY         Review of patient's allergies indicates:   Allergen Reactions    Doxycycline Rash and Hives       Current Facility-Administered Medications   Medication    0.9%  NaCl infusion (for blood administration)    acetaminophen tablet 650 mg    albuterol-ipratropium 2.5 mg-0.5 mg/3 mL nebulizer solution 3 mL    albuterol-ipratropium 2.5 mg-0.5 mg/3 mL nebulizer solution 3 mL    ARIPiprazole tablet 15 mg    atorvastatin tablet 20 mg    bisacodyL suppository 10 mg    cyanocobalamin tablet 250 mcg    DULoxetine DR capsule 60 mg    ferrous sulfate tablet 1 each    gabapentin capsule 200 mg    glucagon (human recombinant) injection 1 mg    glucose chewable tablet 16 g    glucose chewable tablet 24 g    hydrALAZINE tablet 25 mg    lamoTRIgine tablet 200 mg    LORazepam tablet 0.5 mg    losartan-hydrochlorothiazide 50-12.5 mg per tablet 1 tablet    melatonin tablet 6 mg    metoprolol  succinate (TOPROL-XL) 24 hr tablet 100 mg    ondansetron disintegrating tablet 8 mg    polyethylene glycol packet 17 g    promethazine tablet 25 mg    sodium chloride 0.9% flush 10 mL     Current Outpatient Medications   Medication Sig    acetaminophen (TYLENOL) 500 MG tablet Take 1,000 mg by mouth 2 (two) times daily as needed for Pain.    ARIPiprazole (ABILIFY) 15 MG Tab Take 15 mg by mouth once daily.    atorvastatin (LIPITOR) 20 MG tablet Take 1 tablet by mouth once daily.    cyclobenzaprine (FLEXERIL) 10 MG tablet Take 1 tablet (10 mg total) by mouth 3 (three) times daily as needed. (Patient taking differently: Take 10 mg by mouth 3 (three) times daily as needed for Muscle spasms.)    DULoxetine (CYMBALTA) 30 MG capsule Take 30 mg by mouth every evening. (60MG QAM & 30MG QPM)    DULoxetine (CYMBALTA) 60 MG capsule Take 1 capsule (60 mg total) by mouth once daily. (Patient taking differently: Take 60 mg by mouth every morning. (60MG QAM & 30MG QPM))    esomeprazole (NEXIUM) 40 MG capsule Take 1 capsule (40 mg total) by mouth once daily.    FLUoxetine 20 MG capsule Take 20 mg by mouth once daily.    lamoTRIgine (LAMICTAL) 200 MG tablet Take 200 mg by mouth 2 (two) times daily.    lorazepam (ATIVAN) 0.5 MG tablet Take 1 tablet (0.5 mg total) by mouth daily as needed. (Patient taking differently: Take 0.5 mg by mouth daily as needed for Anxiety.)    melatonin (MELATIN) 5 mg Take 10 mg by mouth nightly as needed (sleep).    metoprolol succinate (TOPROL-XL) 100 MG 24 hr tablet Take 1 tablet by mouth daily.    naproxen (NAPROSYN) 500 MG tablet Take 500 mg by mouth 2 (two) times daily with meals.    OLANZapine (ZYPREXA) 5 MG tablet Take 5 mg by mouth once daily.    sumatriptan (IMITREX) 100 MG tablet TAKE ONE TABLET BY MOUTH AT ONSET, THEN ONE TABLET 2 HOURS LATER FOR EACH EPISODE    buPROPion (WELLBUTRIN XL) 150 MG TB24 tablet Take 150 mg by mouth once daily.    triamcinolone acetonide 0.1% (KENALOG) 0.1 % cream   "    Family History       Problem Relation (Age of Onset)    Alzheimer's disease Mother    Asbestos Father    COPD Brother    Heart disease Brother    Hyperlipidemia Mother    Hypertension Father    Lymphoma Brother          Tobacco Use    Smoking status: Former     Current packs/day: 0.00     Types: Cigarettes     Quit date: 1979     Years since quittin.6    Smokeless tobacco: Former   Substance and Sexual Activity    Alcohol use: No    Drug use: Never    Sexual activity: Not on file     ROS  Constitutional: Denies fever/chills  Eyes: Denies change in vision  ENT: Denies sore throat or rhinorrhea   Respiratory: Denies shortness of breath or cough  Cardiovascular: Denies chest pain or palpitations  Gastrointestinal: Denies abdominal pain, nausea, or vomiting  Genitourinary: Denies dysuria and flank pain  Skin: Denies new rash or skin lesions   Allergic/Immunologic: Denies adverse reactions to current medications  Neurological: Denies headaches or dizziness  Musculoskeletal: see HPI    Objective:     Vital Signs (Most Recent):  Temp: 98 °F (36.7 °C) (23)  Pulse: 76 (23)  Resp: 20 (23)  BP: (!) 188/81 (23)  SpO2: 100 % (23) Vital Signs (24h Range):  Temp:  [97.3 °F (36.3 °C)-98.8 °F (37.1 °C)] 98 °F (36.7 °C)  Pulse:  [64-81] 76  Resp:  [16-22] 20  SpO2:  [95 %-100 %] 100 %  BP: (138-209)/() 188/81     Weight: 78.5 kg (173 lb)  Height: 5' 7" (170.2 cm)  Body mass index is 27.1 kg/m².      Intake/Output Summary (Last 24 hours) at 2023 1527  Last data filed at 2023 1518  Gross per 24 hour   Intake 1010.42 ml   Output 700 ml   Net 310.42 ml        Ortho/SPM Exam  Physical Exam:  General:  no apparent distress, WDWN  HENT:  NCAT, Bilateral ears/eyes normal  CV:  Normal pulses, color, and cap refill  Lungs:  Normal respiratory effort  Neuro: No FND, awake, alert  Psych:  Oriented to Person, Place, Time, and Situation    MSK:       " LUE:  Inspection: Skin intact throughout, no swelling, no effusions, no ecchymosis   Palpation: Non-TTP throughout, no palpable abnormality.   ROM: AROM and PROM of the shoulder, elbow, wrist, and hand intact without pain.   Neuro: AIN/PIN/Radial/Median/Ulnar Nerves assessed in isolation without deficit.   SILT throughout.    Vascular: Radial artery palpated 2+. Capillary refill <3s.         RUE:  Inspection: Skin intact throughout, no swelling, no effusions, no ecchymosis   Palpation: Non-TTP throughout, no palpable abnormality.   ROM: AROM and PROM of the shoulder, elbow, wrist, and hand intact without pain.   Neuro: AIN/PIN/Radial/Median/Ulnar Nerves assessed in isolation without deficit.   SILT throughout.    Vascular: Radial artery palpated 2+. Capillary refill <3s.         LLE:  Inspection: Skin intact throughout, no swelling, no effusions, no ecchymosis   Palpation: Non-TTP throughout. No palpable abnormality.   ROM: AROM and PROM of the hip, knee, ankle, and foot intact without pain.   Neuro: TA/EHL/Gastroc/FHL assessed in isolation without deficit.   SILT throughout.    Vascular: Foot is WWP. Capillary refill <3s.         RLE:  Inspection: Skin intact throughout, no open wounds  No swelling or ecchymosis noted along the dorsum or plantar aspect of the foot   Palpation: Mild tenderness to palpation along the plantar aspect of the lateral foot; otherwise non-TTP throughout.  No palpable abnormality  Compartments are soft and compressible   ROM: AROM and PROM of the hip, knee, ankle intact without pain.  Range of motion at the toes limited secondary to pain   Neuro: TA/Gastroc/EHL/FHL assessed in isolation without deficit.   SILT Sa/Curran/DP/SP/T nerve distributions   Vascular: DP and PT arteries palpated 2+. Capillary refill <3s.           Significant Labs: CBC:   Recent Labs   Lab 08/01/23  2309 08/01/23  2315 08/02/23  0905 08/02/23  1352   WBC 6.64  --  7.31 9.16   HGB 4.4*  --  6.1* 7.7*   HCT 18.0* 16*  22.7* 28.9*     --  187 179     All pertinent labs within the past 24 hours have been reviewed.    Significant Imaging: I have reviewed and interpreted all pertinent imaging results/findings.  X-ray of the right foot showing an angulated fracture of the 4th metatarsal with no sagittal displacement; also shown is a healed

## 2023-08-02 NOTE — ED NOTES
Assumed care of pt. Pt resting in semifowlers position in bed. Denies pain. Denies hallucinations at this time. Advised pt on need to monitor intake/output. [place] provided. Pt v/u to notify nurse prior to getting out of bed. Updated on plan of care    Patient identifiers verified and correct for destiny herzog  LOC: The patient is awake, alert and oriented x 3. Appropriate affect; answers questions appropriately; calm, cooperative  APPEARANCE: Patient appears comfortable and in no acute distress, patient is clean and well groomed.  SKIN: The skin is warm and dry, color consistent with ethnicity, patient has normal skin turgor and moist mucus membranes, skin intact, pt denies breakdown or bruising  MUSCULOSKELETAL: Patient moving all extremities spontaneously, no edema noted. Moves independently in bed  RESPIRATORY: Airway is open and patent, respirations are spontaneous, patient has a normal effort and rate, no accessory muscle use noted, pt remains on continuous pulse ox with O2 sats noted at 99% on room air  CARDIAC: pt remains on cardiac monitor. Denies chest pain. NSR noted on monitor  GASTRO: soft and non-tender to palpation. Denies n/v/d/abd pain  : Pt denies any pain or frequency with urination.  NEURO: Pt opens eyes spontaneously, behavior appropriate to situation, follows commands, facial expression symmetrical, bilateral hand grasp equal and even, purposeful motor response noted,clear speech noted.

## 2023-08-02 NOTE — ASSESSMENT & PLAN NOTE
Restart home medications, check lamotrigine level  Hold Zyprexa  Consider psychiatry consult if hallucination persist       soft/nontender/no distention/no masses palpable

## 2023-08-02 NOTE — ED NOTES
Pt was wearing non-skid socks.  Bed alarm now on. Pt v/u to not get out of bed without RN assistance.

## 2023-08-02 NOTE — ED NOTES
"Pt got up to use the restroom, nurse present for stand-by assist. Pt denied dizziness. After a few steps, pt slid to floor, states "I rolled on my right toe and heard it crack."  Assisted pt to sitting position on floor. Assisted pt back to bed. VSS. Pt does c/o soreness to right 4th and 5th toes. Notified treatment team.   "

## 2023-08-02 NOTE — PLAN OF CARE
[facility] - Emergency Dept  Initial Discharge Assessment       Primary Care Provider: Srikanth Preston MD    Admission Diagnosis: Visual hallucinations [R44.1]    Admission Date: 8/1/2023  Expected Discharge Date: 8/3/2023    Pt stated that she is independent with her ADL's and does not require assistance.  Pt stated she uses a rollator to assist with ambulation    Pt to d/c home with no needs when ready, and has support of brother Juancho and daughter aTmica    Transition of Care Barriers: (P) None    Payor: [employer] MEDICARE / Plan: [employer] MEDICARE HMO / Product Type: Capitation /     Extended Emergency Contact Information  Primary Emergency Contact: Tamica Melendez  Address: [address] [place] [address]  Mobile Phone: [phone]  Relation: Daughter    Discharge Plan A: (P) Home  Discharge Plan B: (P) Home      [facility] [place], [place] - [address]  [address]  [facility] [address]  [facility] [address]  Phone: [phone] Fax: [phone]    [facility] - [place], [place] - [address]  [address]  [facility] [address]  Phone: [phone] Fax: [phone]    [id number] [place] - [address] [place] [address] AT [facility]  [address] [facility] [address]  Phone: [phone] Fax: [phone]    [facility]  [address]  [facility] [address]      [facility]  [address]  [facility] [address]  Phone: [phone] Fax: [phone]      Initial Assessment (most recent)       Adult Discharge Assessment - 08/02/23 1000          Discharge Assessment    Assessment Type Discharge Planning Assessment (P)      Confirmed/corrected address, phone number and insurance Yes (P)      Confirmed Demographics Correct on Facesheet (P)      Source of Information patient (P)      Reason For  Admission Symptomatic anemia (P)      People in Home child(tarik), adult;sibling(s) (P)      Facility Arrived From: home (P)      Do you expect to return to your current living situation? Yes (P)      Do you have help at home or someone to help you manage your care at home? No (P)      Prior to hospitilization cognitive status: Alert/Oriented;No Deficits (P)      Current cognitive status: Alert/Oriented;No Deficits (P)      Walking or Climbing Stairs ambulation difficulty, requires equipment (P)      Mobility Management rollator (P)      Home Accessibility stairs to enter home (P)      Number of Stairs, Main Entrance five (P)      Home Layout Able to live on 1st floor (P)      Equipment Currently Used at Home rollator (P)      Patient currently being followed by outpatient case management? No (P)      Do you currently have service(s) that help you manage your care at home? No (P)      Do you have any problems affording any of your prescribed medications? No (P)      Is the patient taking medications as prescribed? yes (P)      Who is going to help you get home at discharge? family (P)      How do you get to doctors appointments? car, drives self;health plan transportation (P)      Are you on dialysis? No (P)      Do you take coumadin? No (P)      Discharge Plan A Home (P)      Discharge Plan B Home (P)      DME Needed Upon Discharge  none (P)      Discharge Plan discussed with: Patient (P)      Transition of Care Barriers None (P)         OTHER    Name(s) of People in Home daughter Tamica and brother Juancho (P)                       Tamica Thomson, LINNEA, MSW, LMSW, RSW   Case Management  [facility]  Email: [email or web address]

## 2023-08-02 NOTE — ED NOTES
One IV is leaking/ doesn't draw blood back.  Other IV doesn't draw back. Multiple attempts to draw blood with butterfly unsuccessful.  Notified team asking for assistance from another RN

## 2023-08-02 NOTE — CARE UPDATE
"Patient seen and evaluated by me. Hgb 4.4 on admission. Patient received one unit with Hgb 6.1. Will receive a second unit and recheck CBC after. Patient had a mechanical fall when ambulating to the bathroom. Did not lose consciousness or hit her head. States she "heard a crack" in her foot. Xray of her R foot shows an acute 4th metatarsal fracture. Ortho consulted. Advised patient and nursing staff that she should not be ambulating at this time while receiving blood. All voiced understanding. Patient also noted to have audible expiratory wheezing on exam in all long fields. States this has been ongoing for a while and has an appt outpatient. CT chest pending. Echo pending. Ordered IV steroids and duo nebs for symptom relief. 100% O2 on RA. Monitoring BP as patient states she has a hard time managing it in the past. Will continue to monitor.     Joan Mcintosh PA-C  [facility]  [facility]  "

## 2023-08-02 NOTE — HPI
Elly Harris is a 68 y.o. female with a PMH of bipolar disorder, fibromyalgia, GERD, hypertension, who presents with right foot pain.  Patient experienced a mechanical fall from standing onto her right side while ambulating to the bathroom today.  States that she felt a pop in her foot when she fell and experienced immediate pain and inability to bear weight.  She denies hitting her head or loss of consciousness.  Endorses some pain at her right lower leg but otherwise denies any other musculoskeletal pains.  Also denies any numbness, tingling, or weakness in her right lower extremity.  She ambulates without assistance at baseline and is currently admitted for symptomatic anemia.  Orthopedics was consulted for evaluation of an acute 4th metatarsal fracture.

## 2023-08-03 ENCOUNTER — PATIENT OUTREACH (OUTPATIENT)
Dept: ADMINISTRATIVE | Facility: HOSPITAL | Age: 68
End: 2023-08-03
Payer: MEDICARE

## 2023-08-03 PROBLEM — R06.02 SOB (SHORTNESS OF BREATH): Status: ACTIVE | Noted: 2023-08-03

## 2023-08-03 LAB
ALBUMIN SERPL BCP-MCNC: 3.3 G/DL (ref 3.5–5.2)
ALP SERPL-CCNC: 120 U/L (ref 55–135)
ALT SERPL W/O P-5'-P-CCNC: 13 U/L (ref 10–44)
ANION GAP SERPL CALC-SCNC: 14 MMOL/L (ref 8–16)
AST SERPL-CCNC: 11 U/L (ref 10–40)
BASOPHILS # BLD AUTO: 0.01 K/UL (ref 0–0.2)
BASOPHILS NFR BLD: 0.2 % (ref 0–1.9)
BILIRUB SERPL-MCNC: 0.8 MG/DL (ref 0.1–1)
BUN SERPL-MCNC: 22 MG/DL (ref 8–23)
CALCIUM SERPL-MCNC: 9.1 MG/DL (ref 8.7–10.5)
CHLORIDE SERPL-SCNC: 100 MMOL/L (ref 95–110)
CO2 SERPL-SCNC: 25 MMOL/L (ref 23–29)
CREAT SERPL-MCNC: 1.2 MG/DL (ref 0.5–1.4)
DIFFERENTIAL METHOD: ABNORMAL
EOSINOPHIL # BLD AUTO: 0 K/UL (ref 0–0.5)
EOSINOPHIL NFR BLD: 0 % (ref 0–8)
ERYTHROCYTE [DISTWIDTH] IN BLOOD BY AUTOMATED COUNT: 26.2 % (ref 11.5–14.5)
EST. GFR  (NO RACE VARIABLE): 49 ML/MIN/1.73 M^2
GLUCOSE SERPL-MCNC: 139 MG/DL (ref 70–110)
HCT VFR BLD AUTO: 25.7 % (ref 37–48.5)
HGB BLD-MCNC: 7.7 G/DL (ref 12–16)
IMM GRANULOCYTES # BLD AUTO: 0.06 K/UL (ref 0–0.04)
IMM GRANULOCYTES NFR BLD AUTO: 1 % (ref 0–0.5)
LYMPHOCYTES # BLD AUTO: 0.3 K/UL (ref 1–4.8)
LYMPHOCYTES NFR BLD: 5.5 % (ref 18–48)
MAGNESIUM SERPL-MCNC: 1.9 MG/DL (ref 1.6–2.6)
MCH RBC QN AUTO: 20.7 PG (ref 27–31)
MCHC RBC AUTO-ENTMCNC: 30 G/DL (ref 32–36)
MCV RBC AUTO: 69 FL (ref 82–98)
MONOCYTES # BLD AUTO: 0.2 K/UL (ref 0.3–1)
MONOCYTES NFR BLD: 2.8 % (ref 4–15)
NEUTROPHILS # BLD AUTO: 5.6 K/UL (ref 1.8–7.7)
NEUTROPHILS NFR BLD: 90.5 % (ref 38–73)
NRBC BLD-RTO: 1 /100 WBC
PHOSPHATE SERPL-MCNC: 3.3 MG/DL (ref 2.7–4.5)
PLATELET # BLD AUTO: 170 K/UL (ref 150–450)
PMV BLD AUTO: ABNORMAL FL (ref 9.2–12.9)
POTASSIUM SERPL-SCNC: 4.1 MMOL/L (ref 3.5–5.1)
PROT SERPL-MCNC: 5.9 G/DL (ref 6–8.4)
RBC # BLD AUTO: 3.72 M/UL (ref 4–5.4)
SODIUM SERPL-SCNC: 139 MMOL/L (ref 136–145)
WBC # BLD AUTO: 6.17 K/UL (ref 3.9–12.7)

## 2023-08-03 PROCEDURE — 25000003 PHARM REV CODE 250: Mod: HCNC

## 2023-08-03 PROCEDURE — 36415 COLL VENOUS BLD VENIPUNCTURE: CPT | Mod: HCNC

## 2023-08-03 PROCEDURE — 94761 N-INVAS EAR/PLS OXIMETRY MLT: CPT | Mod: HCNC

## 2023-08-03 PROCEDURE — 99900035 HC TECH TIME PER 15 MIN (STAT): Mod: HCNC

## 2023-08-03 PROCEDURE — 99233 PR SUBSEQUENT HOSPITAL CARE,LEVL III: ICD-10-PCS | Mod: HCNC,,,

## 2023-08-03 PROCEDURE — 97116 GAIT TRAINING THERAPY: CPT | Mod: HCNC

## 2023-08-03 PROCEDURE — 94799 UNLISTED PULMONARY SVC/PX: CPT | Mod: HCNC

## 2023-08-03 PROCEDURE — 85025 COMPLETE CBC W/AUTO DIFF WBC: CPT | Mod: HCNC

## 2023-08-03 PROCEDURE — 94640 AIRWAY INHALATION TREATMENT: CPT | Mod: HCNC,XB

## 2023-08-03 PROCEDURE — 84100 ASSAY OF PHOSPHORUS: CPT | Mod: HCNC

## 2023-08-03 PROCEDURE — 25000242 PHARM REV CODE 250 ALT 637 W/ HCPCS: Mod: HCNC | Performed by: INTERNAL MEDICINE

## 2023-08-03 PROCEDURE — 25000003 PHARM REV CODE 250: Mod: HCNC | Performed by: STUDENT IN AN ORGANIZED HEALTH CARE EDUCATION/TRAINING PROGRAM

## 2023-08-03 PROCEDURE — 97165 OT EVAL LOW COMPLEX 30 MIN: CPT | Mod: HCNC

## 2023-08-03 PROCEDURE — 97161 PT EVAL LOW COMPLEX 20 MIN: CPT | Mod: HCNC

## 2023-08-03 PROCEDURE — 83735 ASSAY OF MAGNESIUM: CPT | Mod: HCNC

## 2023-08-03 PROCEDURE — 80053 COMPREHEN METABOLIC PANEL: CPT | Mod: HCNC

## 2023-08-03 PROCEDURE — 97535 SELF CARE MNGMENT TRAINING: CPT | Mod: HCNC

## 2023-08-03 PROCEDURE — 99233 SBSQ HOSP IP/OBS HIGH 50: CPT | Mod: HCNC,,,

## 2023-08-03 PROCEDURE — G0378 HOSPITAL OBSERVATION PER HR: HCPCS | Mod: HCNC

## 2023-08-03 RX ORDER — LOSARTAN POTASSIUM 50 MG/1
50 TABLET ORAL ONCE
Status: COMPLETED | OUTPATIENT
Start: 2023-08-03 | End: 2023-08-03

## 2023-08-03 RX ORDER — FERROUS SULFATE 325(65) MG
325 TABLET, DELAYED RELEASE (ENTERIC COATED) ORAL DAILY
Qty: 30 TABLET | Refills: 11 | Status: SHIPPED | OUTPATIENT
Start: 2023-08-03 | End: 2024-08-02

## 2023-08-03 RX ORDER — POLYETHYLENE GLYCOL 3350 17 G/17G
17 POWDER, FOR SOLUTION ORAL 2 TIMES DAILY PRN
Refills: 0 | COMMUNITY
Start: 2023-08-03 | End: 2023-08-07 | Stop reason: SDUPTHER

## 2023-08-03 RX ORDER — LOSARTAN POTASSIUM AND HYDROCHLOROTHIAZIDE 12.5; 1 MG/1; MG/1
1 TABLET ORAL DAILY
Qty: 90 TABLET | Refills: 3 | Status: SHIPPED | OUTPATIENT
Start: 2023-08-04 | End: 2023-08-07 | Stop reason: HOSPADM

## 2023-08-03 RX ORDER — CYANOCOBALAMIN (VITAMIN B-12) 250 MCG
250 TABLET ORAL DAILY
Qty: 30 TABLET | Refills: 5 | Status: SHIPPED | OUTPATIENT
Start: 2023-08-04 | End: 2024-01-31

## 2023-08-03 RX ORDER — LOSARTAN POTASSIUM AND HYDROCHLOROTHIAZIDE 12.5; 1 MG/1; MG/1
1 TABLET ORAL DAILY
Status: DISCONTINUED | OUTPATIENT
Start: 2023-08-04 | End: 2023-08-06

## 2023-08-03 RX ORDER — ATORVASTATIN CALCIUM 20 MG/1
TABLET, FILM COATED ORAL
Qty: 90 TABLET | Refills: 0 | Status: SHIPPED | OUTPATIENT
Start: 2023-08-03

## 2023-08-03 RX ADMIN — IPRATROPIUM BROMIDE AND ALBUTEROL SULFATE 3 ML: .5; 3 SOLUTION RESPIRATORY (INHALATION) at 04:08

## 2023-08-03 RX ADMIN — GABAPENTIN 200 MG: 100 CAPSULE ORAL at 08:08

## 2023-08-03 RX ADMIN — LOSARTAN POTASSIUM 50 MG: 50 TABLET, FILM COATED ORAL at 11:08

## 2023-08-03 RX ADMIN — IPRATROPIUM BROMIDE AND ALBUTEROL SULFATE 3 ML: .5; 3 SOLUTION RESPIRATORY (INHALATION) at 12:08

## 2023-08-03 RX ADMIN — LAMOTRIGINE 200 MG: 100 TABLET ORAL at 08:08

## 2023-08-03 RX ADMIN — LOSARTAN POTASSIUM AND HYDROCHLOROTHIAZIDE 1 TABLET: 50; 12.5 TABLET, FILM COATED ORAL at 08:08

## 2023-08-03 RX ADMIN — ATORVASTATIN CALCIUM 20 MG: 20 TABLET, FILM COATED ORAL at 08:08

## 2023-08-03 RX ADMIN — METOPROLOL SUCCINATE 100 MG: 100 TABLET, EXTENDED RELEASE ORAL at 08:08

## 2023-08-03 RX ADMIN — ARIPIPRAZOLE 15 MG: 15 TABLET ORAL at 08:08

## 2023-08-03 RX ADMIN — DULOXETINE HYDROCHLORIDE 60 MG: 60 CAPSULE, DELAYED RELEASE ORAL at 08:08

## 2023-08-03 RX ADMIN — IPRATROPIUM BROMIDE AND ALBUTEROL SULFATE 3 ML: .5; 3 SOLUTION RESPIRATORY (INHALATION) at 07:08

## 2023-08-03 RX ADMIN — CYANOCOBALAMIN TAB 250 MCG 250 MCG: 250 TAB at 08:08

## 2023-08-03 RX ADMIN — IPRATROPIUM BROMIDE AND ALBUTEROL SULFATE 3 ML: .5; 3 SOLUTION RESPIRATORY (INHALATION) at 08:08

## 2023-08-03 RX ADMIN — FERROUS SULFATE TAB EC 325 MG (65 MG FE EQUIVALENT) 1 EACH: 325 (65 FE) TABLET DELAYED RESPONSE at 08:08

## 2023-08-03 NOTE — NURSING
Informed of elevated /117. BP medication given. MD,was present in room and aware of BP. Pt stable no dizziness nor blurry vision. Tolerated medication well.

## 2023-08-03 NOTE — TELEPHONE ENCOUNTER
No care due was identified.  [facility] Embedded Care Due Messages. Reference number: [id number].   8/03/2023 4:33:41 AM CDT

## 2023-08-03 NOTE — PLAN OF CARE
Problem: Physical Therapy  Goal: Physical Therapy Goal  Description: PT goals until 8/13/23    1. Pt sit to stand with RW with WBAT in R darco shoe with mod independent-not met  2. Pt to perform gait 100ft with WBAT in R darco shoe with RW with supervision.-not met  3. Pt to up/down 5 steps with R UE rail with WBAT in R darco shoe with CGA.-not met  4. Pt to perform B LE exs in sitting or supine x 10 reps to strengthen B LE to improve functional mobility.-not met   Outcome: Ongoing, Progressing   Pt's goals set and pt will benefit from skilled PT services to work towards improved functional mobility including: up/down steps, transfers, and gait.   8/3/2023

## 2023-08-03 NOTE — PLAN OF CARE
Problem: Adult Inpatient Plan of Care  Goal: Plan of Care Review  Outcome: Ongoing, Progressing  Goal: Patient-Specific Goal (Individualized)  Outcome: Ongoing, Progressing  Goal: Absence of Hospital-Acquired Illness or Injury  Outcome: Ongoing, Progressing  Goal: Optimal Comfort and Wellbeing  Outcome: Ongoing, Progressing  Goal: Readiness for Transition of Care  Outcome: Ongoing, Progressing     Problem: Infection  Goal: Absence of Infection Signs and Symptoms  Outcome: Ongoing, Progressing     Pt progressing towards goals. No distress notice. No falls or injuries during shift. Bed in lowest position, call light within reach, belonging at bedside. Safety precaution maintain.Plan of Care reviewed. Pt verbalized understanding.

## 2023-08-03 NOTE — PROGRESS NOTES
[facility] - Observation [address] [place] [address] Medicine  Progress Note    Patient Name: Elly Harris  MRN: [id number]  Patient Class: OP- Observation   Admission Date: 8/1/2023  Length of Stay: 0 days  Attending Physician: Kendall Madrid MD  Primary Care Provider: Srikanth Preston MD        Subjective:     Principal Problem:Symptomatic anemia        HPI:  Patient is a 68 y.o. female with medical history of depression, bipolar disorder, fibromyalgia, GERD, hypertension presenting with visual and auditory hallucinations for the past 3 days.  Patient states that 2 weeks ago she was started on Zyprexa for her depression and bipolar disorder.  Patient is unsure whether this is contributing to her recent hallucinations.  Patient states she has been hallucinating spiders, mice and men with knives. Patient reports she was diagnosed with some short of anemia two years ago but was never treated.  Her last blood work was at that time.  Her last colonoscopy was fiver years ago. She complaining of shortness of breath and wheezing with any exertion which have been going on for the past week.  States all her pain are chronic.  No blood in urine.  No increase back pain from back line.        In the ED she was found to have severe microcytic anemia with hh 4.1/17.3 plt of 94.  Denies bleeding.  Denies melena.  Reports severe dyspnea with exertion.        Overview/Hospital Course:  Elly Harris was admitted to hospital medicine for symptomatic anemia. Hgb 4.1 on admission, improved to 6.1 following one unit and again to 8.8 following a second unit. Hgb stable at 7.7 on repeat. Will follow up chronic iron deficiency anemia with her outpatient hematologist through [facility]. Patient hypertensive on admission, question of medication compliance. Restarted home meds with minimal improvement. Increased losartan-HCTZ to 100mg-12.5mg. Plan to dc with new order for BP cuff and instruction to complete BP log prior to PCP follow up in 1 week.  "Additionally, reports having visual hallucinations since starting zyprexa per opt psychiatry. Psych consulted and do not believe that her hallucinations are medication related, and recommend follow up with her outpatient psychiatrist through [facility]. Also while patient was in the ED, she got up to use to the restroom and tripped, hearing a "pop" in her foot. Xray showed an acute 4th metatarsal fracture. Ortho consulted, recommend weightbearing as tolerated with hard sole boot - provided. Therapy to assess functional status. Finally, patient reporting chronic worsening SOB. Echo with an EF of 55%, CVP of 8. CXR with nonspecific findings, CT chest consistent with pulmonary edema including interlobular septal thickening, fissural thickening and bilateral pleural effusions. Gave 40mg of Lasix with some improvement. Continue diuretic therapy [facility]. Started on douneb and IV steroids. States she is establishing care with a pulmonologist at [facility] this month. Plan for a 6 minute walk test. Therapy eval completed - recommending SNF given acute injury and instability with ambulation. Patient will be discharged to SNF when medically ready with ortho and PCP follow up through [facility]. Remainder of care through [facility] as previously stated.       Interval History: NAEON. AF. Uncontrolled hypertension despite resuming home BP meds. Making dose adjustments today. Patient received ortho boot. PT assessed and recommends SNF for gait instability. CM assisting.    Review of Systems   Constitutional:  Positive for fatigue. Negative for activity change, chills and fever.   HENT: Negative.  Negative for trouble swallowing.    Eyes: Negative.  Negative for photophobia and visual disturbance.   Respiratory:  Positive for shortness of breath and wheezing. Negative for cough and chest tightness.    Cardiovascular: Negative.  Negative for chest pain, palpitations and leg swelling.   Gastrointestinal: Negative.  Negative for " abdominal pain, constipation, diarrhea, nausea and vomiting.   Genitourinary:  Negative for dysuria, frequency and hematuria.   Musculoskeletal:  Positive for arthralgias and joint swelling. Negative for back pain, gait problem and neck pain.   Skin:  Negative for rash and wound.   Neurological:  Negative for dizziness, syncope, speech difficulty and light-headedness.   Psychiatric/Behavioral:  Positive for confusion and hallucinations (visual). Negative for agitation. The patient is not nervous/anxious.      Objective:     Vital Signs (Most Recent):  Temp: 97.4 °F (36.3 °C) (08/03/23 1151)  Pulse: 71 (08/03/23 1218)  Resp: 16 (08/03/23 1218)  BP: 131/60 (08/03/23 1151)  SpO2: 97 % (08/03/23 1218) Vital Signs (24h Range):  Temp:  [97.4 °F (36.3 °C)-99.3 °F (37.4 °C)] 97.4 °F (36.3 °C)  Pulse:  [65-84] 71  Resp:  [12-20] 16  SpO2:  [93 %-100 %] 97 %  BP: (131-190)/() 131/60     Weight: 78.5 kg (173 lb)  Body mass index is 27.1 kg/m².    Intake/Output Summary (Last 24 hours) at 8/3/2023 1407  Last data filed at 8/3/2023 0537  Gross per 24 hour   Intake --   Output 2650 ml   Net -2650 ml         Physical Exam  Constitutional:       Appearance: Normal appearance. She is obese.   HENT:      Head: Atraumatic.      Right Ear: External ear normal.      Left Ear: External ear normal.      Nose: Nose normal.      Mouth/Throat:      Mouth: Mucous membranes are dry.      Pharynx: Oropharynx is clear. No oropharyngeal exudate or posterior oropharyngeal erythema.   Eyes:      Extraocular Movements: Extraocular movements intact.      Pupils: Pupils are equal, round, and reactive to light.      Comments: Pale conjunctivae   Cardiovascular:      Rate and Rhythm: Normal rate.      Pulses: Normal pulses.      Heart sounds: Murmur heard.   Pulmonary:      Effort: Pulmonary effort is normal.      Breath sounds: Wheezing present.   Abdominal:      Palpations: Abdomen is soft.      Tenderness: There is no abdominal tenderness. There  is no guarding.      Hernia: No hernia is present.   Musculoskeletal:         General: No signs of injury. Normal range of motion.      Cervical back: Normal range of motion. No rigidity.      Right lower leg: No edema.      Left lower leg: No edema.   Lymphadenopathy:      Cervical: No cervical adenopathy.   Skin:     General: Skin is warm and dry.      Capillary Refill: Capillary refill takes 2 to 3 seconds.      Coloration: Skin is not pale.   Neurological:      General: No focal deficit present.      Mental Status: She is alert and oriented to person, place, and time. Mental status is at baseline.   Psychiatric:         Mood and Affect: Mood normal.             Significant Labs: All pertinent labs within the past 24 hours have been reviewed.  BMP:   Recent Labs   Lab 08/03/23  0243   *      K 4.1      CO2 25   BUN 22   CREATININE 1.2   CALCIUM 9.1   MG 1.9     CBC:   Recent Labs   Lab 08/02/23  1352 08/02/23  1846 08/03/23  0243   WBC 9.16 9.14 6.17   HGB 7.7* 8.8* 7.7*   HCT 28.9* 29.1* 25.7*    214 170       Significant Imaging: I have reviewed all pertinent imaging results/findings within the past 24 hours.    Imaging Results              X-Ray Pelvis Routine AP (Final result)  Result time 08/03/23 00:07:30      Final result by Hermila Jimenez MD (08/03/23 00:07:30)                   Impression:      No acute abnormality involving the pelvis as imaged.      Electronically signed by: Hermila Jimenez  Date:    08/03/2023  Time:    00:07               Narrative:    EXAMINATION:  XR PELVIS ROUTINE AP    CLINICAL HISTORY:  fall from standing onto right side w/ right hip pain;    TECHNIQUE:  AP view of the pelvis was performed.    COMPARISON:  None.    FINDINGS:  There is no evidence of acute fracture or dislocation on this limited AP pelvis exam.  Inter medullary right femoral neil transfixed by inter trochanteric screw grossly appears intact as imaged.  Hip joint spaces bilaterally  appear symmetric and well maintained.  SI joints appear symmetric and pubic symphysis grossly appears intact.  Bowel gas pattern pelvic calcifications are unremarkable.                                       X-Ray Tibia Fibula 2 View Right (Final result)  Result time 08/02/23 23:53:05      Final result by Melissa Aguirre MD (08/02/23 23:53:05)                   Impression:      No acute bony abnormality detected.      Electronically signed by: Melissa Aguirre  Date:    08/02/2023  Time:    23:53               Narrative:    EXAMINATION:  TWO VIEWS OF THE RIGHT TIBIA AND FIBULA    CLINICAL HISTORY:  fall with right 4th metatarsal fx;    TECHNIQUE:  AP and lateral view of the right tibia and fibula    COMPARISON:  None.    FINDINGS:  Two views of the right tibia and fibula demonstrate no acute fracture or dislocation.  There are healed fractures of the distal fibula.  The bones are diffusely osteopenic.                                       X-Ray Foot Complete Right (Final result)  Result time 08/02/23 12:16:16   Procedure changed from X-Ray Foot 2 View Right     Final result by Russell Cleaning MD (08/02/23 12:16:16)                   Impression:      Fracture of the 4th metatarsal      Electronically signed by: Russell Cleaning MD  Date:    08/02/2023  Time:    12:16               Narrative:    EXAMINATION:  XR FOOT COMPLETE 3 VIEW RIGHT    CLINICAL HISTORY:  Fell, now have foot pain;.    TECHNIQUE:  AP, lateral, and oblique views of the right foot were performed.    COMPARISON:  07/11/2017    FINDINGS:  There is acute fracture through the mid 4th metatarsal with some angulation present.  Old healed fracture of the proximal 2nd metatarsal can be seen.  Plantar calcaneal spurs are noted.                                       CT Chest Without Contrast (Final result)  Result time 08/02/23 13:19:06      Final result by Inderjit Lara MD (08/02/23 13:19:06)                   Impression:      Findings  consistent with pulmonary edema including interlobular septal thickening, fissural thickening and bilateral pleural effusions.    Solid noncalcified 9 mm nodule in the left lower lobe.  For a solid nodule >8 mm, Fleischner Society 2017 guidelines recommend considering CT, PET/CT or tissue sampling at 3 months.    Calcified material in the esophagus, recommend correlation with possible retention of medications.    Additional findings as above.    Electronically signed by resident: Jc Garcia  Date:    08/02/2023  Time:    11:22    Electronically signed by: Inderjit Lara  Date:    08/02/2023  Time:    13:19               Narrative:    EXAMINATION:  CT CHEST WITHOUT CONTRAST    CLINICAL HISTORY:  Abnormal xray - lung nodule, < 1 cm, low risk;    TECHNIQUE:  Low dose axial images, sagittal and coronal reformations were obtained from the thoracic inlet to the lung bases. Contrast was not administered.    COMPARISON:  CT chest 10/11/2019, chest radiograph 08/02/2023.    CT of the thoracic spine dated 03/17/2020    FINDINGS:  Base of Neck and thoracic soft tissue: Subcentimeter  lymph node measuring 0.9 cm in station 1.  No pathologically enlarged lymph nodes.  No other significant abnormality.    Aorta: Left-sided aortic arch.  No aneurysm.  Mild scattered atherosclerosis.    Heart: Normal size. No effusion.    Pulmonary vasculature: Pulmonary arteries distribute normally.  There are four pulmonary veins.    Christine/Mediastinum: No pathologic bety enlargement.  Subcentimeter lymph nodes in mediastinum.    Lungs/Pleura/airways: Central airways are patent.  There are small bilateral pleural effusions, right greater than left.  Thickening of the right minor fissure with adjacent platelike atelectasis.  There is interlobular septal thickening and diffuse mosaic attenuation.  Tiny calcified granuloma in the right upper lobe.  There are no focal consolidations.  Solid noncalcified nodule measuring 9 x 9 mm begin (mean  diameter 9 mm) located in the left lower lobe (axial image 288 series 4).    Esophagus: Rounded hyperdense material in the esophagus, may represent retained medications.    Upper Abdomen: Multiple rounded hepatic hypodensities the largest of which is in the left lobe measuring 3.6 cm.  These are favored to represent hepatic cysts.    Bones: Fracture deformity of multiple posterior left ribs, age indeterminate.  Degenerative changes in the spine, including wedge deformity of T8 and T12 grossly unchanged respect to the CT of the thoracic spine dated 03/17/2020.                                       X-Ray Chest AP Portable (Final result)  Result time 08/02/23 01:38:36      Final result by Jc Shearer MD (08/02/23 01:38:36)                   Impression:      Radiographic findings as above.      Electronically signed by: Jc Shearer  Date:    08/02/2023  Time:    01:38               Narrative:    EXAMINATION:  XR CHEST AP PORTABLE    CLINICAL HISTORY:  post tranfusion pulm edema;    TECHNIQUE:  Single frontal view of the chest was performed.    COMPARISON:  None    FINDINGS:  Single portable chest view is submitted.  The heart size appears upper normal.  Mild aortic atherosclerotic change noted.    There is prominence of the central pulmonary vascular.  There is bilateral pattern of interstitial, ground-glass and patchy alveolar infiltrates, right greater than left.    There is no evidence for pleural effusion and there is no evidence for pneumothorax.    There are remote appearing rib fractures on the left, indeterminate age rib fracture of the posterior left 3rd rib.  The osseous structures otherwise appear intact.                                       CT Head Without Contrast (Final result)  Result time 08/02/23 00:26:08      Final result by Hermila Jimenez MD (08/02/23 00:26:08)                   Impression:      No acute intracranial abnormality or acute fracture.    Interval development of left  mastoiditis.    Nasal bone fractures bilaterally appear remote with no overlying soft tissue swelling seen.    Senescent atrophic changes.      Electronically signed by: Hermila Tony  Date:    08/02/2023  Time:    00:26               Narrative:    EXAMINATION:  CT HEAD WITHOUT CONTRAST    CLINICAL HISTORY:  Mental status change, unknown cause;    TECHNIQUE:  Low dose axial images were obtained through the head.  Coronal and sagittal reformations were also performed. Contrast was not administered.    COMPARISON:  09/04/2021 CT brain    FINDINGS:  There is no evidence of acute intra or extra-axial hemorrhage or hematoma.  The gray-white matter junction differentiation is intact with no evidence of acute major arterial infarct.  There is prominence of the ventricles, cisterns and sulci consistent with senescent atrophic changes.  There is no hydrocephalus.    The posterior fossa structures appear unremarkable as imaged.  There is a empty sella configuration of the pituitary gland.    There is no focal mass or mass effect.    There is interval development of fluid in multiple mastoid air cells in the mastoid tip on the left compatible mastoiditis.  The remaining mastoid air cells and middle ears appear clear.  Paranasal sinuses appear clear.  Orbital structures appear symmetric and the bony calvarium appears stable/intact.  There are nasal bone fractures bilaterally mildly depressed on the right.  There is no overlying soft tissue swelling to suggest acute fracture.  Facial bones otherwise are unremarkable.                                          Assessment/Plan:      SOB (shortness of breath)    Pt presenting with SOB, could be 2/2 anemia or underlying lung disease   - CXR: There is prominence of the central pulmonary vascular.  There is bilateral pattern of interstitial, ground-glass and patchy alveolar infiltrates, right greater than left.  - echo:    Results for orders placed during the hospital encounter of  08/01/23    Echo    Interpretation Summary    Left Ventricle: The left ventricle is normal in size. Ventricular mass is normal. Normal wall thickness. Normal wall motion. There is normal systolic function. Ejection fraction by visual approximation is 55%. There is normal diastolic function.    Left Atrium: Left atrium is mildly dilated. The left atrium volume index is 37.9 mL/m2.    Right Ventricle: Normal right ventricular cavity size. Wall thickness is normal. Right ventricle wall motion  is normal. Systolic function is normal.    Right Atrium: Normal right atrial size.    Aortic Valve: The aortic valve is structurally normal. There is normal leaflet mobility. There is no significant regurgitation.    Mitral Valve: The mitral valve is structurally normal. There is normal leaflet mobility. There is mild regurgitation.    Tricuspid Valve: The tricuspid valve is structurally normal. There is normal leaflet mobility. There is mild transvalvular regurgitation.    Pulmonic Valve: The pulmonic valve is structurally normal. There is normal leaflet mobility. There is no significant regurgitation.    Aorta: Aortic root is normal in size measuring 2.70 cm. Ascending aorta is normal.    Pulmonary Artery: There is pulmonary hypertension. The estimated pulmonary artery systolic pressure is 46 mmHg.    IVC/SVC: Intermediate venous pressure at 8 mmHg.    Pericardium: The pericardium is normal. There is no pericardial effusion.  - CT chest:    -Findings consistent with pulmonary edema including interlobular septal thickening, fissural thickening and bilateral pleural effusions.  - given 40mg of lasix   --> continue diuresis with HCTZ   - started on dounebs   - one dose of IV steroids given       Closed displaced fracture of fourth metatarsal bone of right foot  - right foot with acute 4th metatarsal fracture  - ortho consulted:    -Recommend patient remain weight-bearing as tolerated in a hard-sole postop shoe with  rocker bottom along with a multimodal pain regimen.  Daily physical therapy; allow patient to ambulate with normal shoe on left foot to avoid length-discrepancy when up and walking. We will have her follow-up in outpatient trauma clinic (patient will be contacted with scheduling information).  - PT/OT rec SNF     Thrombocytopenia  Could be secondary to her medications      Essential hypertension  Uncontrolled   Continue BB.    - pt hypertensive on home meds but unclear if compliant with ACEi; restart losartan-HCTZ    - Increased losartan-HCTZ to 100mg-12.5mg  PCP follow up in 1 week   BP cuff ordered; education patient on keeping BP log to review at clinic f/u       Bipolar 2 disorder  Restart home medications, check lamotrigine level  Hold Zyprexa on admit --> zyprexa resumed per psych recs   - psych does not believe VH are due to zyprexa - recommend continuation of home meds and close psych clinic follow up       Psychosis  Could be side effect of Zyprexa or secondary to bipolar disorder now with psychosis futures  - consulted psych:    -As patient is not having symptoms consistent with her underlying psychiatric disorder, recommend workup/correction of her underlying medical issues per primary team (anemia, B12 deficiency) and continuing her outpatient psychiatric therapeutic regimen.    - follow up with outpatient psych      Microcytic anemia  Symptomatic   Repeat HH   - Hgb stable at 7.7 following 2 Units   Transfuse for hgb less than 7  Anemia work up    - consistent with known iron deficiency anemia, start iron supplements      --> previously followed with hematology at [facility] - patient will re-establish care    - also found to be B12 deficient, start supplements daily      GERD (gastroesophageal reflux disease)  Restart home PPI      Fibromyalgia  Restart home medications      Depression  Restart home medications        Hyperlipidemia  Restart home medications        VTE Risk Mitigation (From admission,  onward)         Ordered     IP VTE LOW RISK PATIENT  Once         08/02/23 0751     Place sequential compression device  Until discontinued         08/02/23 0751                Discharge Planning   FARAZ: 8/4/2023     Code Status: Full Code   Is the patient medically ready for discharge?: Yes    Reason for patient still in hospital (select all that apply): Patient trending condition, Treatment, Consult recommendations, PT / OT recommendations and Pending disposition  Discharge Plan A: Home                  Sanam Dumont PA-C  Department of Hospital Medicine   [facility] - Observation 11H

## 2023-08-03 NOTE — PLAN OF CARE
08/03/23 1601   Post-Acute Status   Post-Acute Authorization Placement   Post-Acute Placement Status Referrals Sent     Discharge recommendations for pt are SNF. JOSE sent referrals via [facility] and is waiting for an accepting facility.     JOSE will continue to follow up.    UPDATE    SW completed LOCET and faxed PASRR to state. Waiting on 142.    PASRR uploaded to [employer].    4:37PM    JOSE submitted for SNF authorization for [employer]. Pre-Authorization# [id number]    Rosenda Montejo LMSW  [facility]  Ext. [phone]

## 2023-08-03 NOTE — ASSESSMENT & PLAN NOTE
Pt presenting with SOB, could be 2/2 anemia or underlying lung disease   - CXR: There is prominence of the central pulmonary vascular.  There is bilateral pattern of interstitial, ground-glass and patchy alveolar infiltrates, right greater than left.  - echo:    Results for orders placed during the hospital encounter of 08/01/23    Echo    Interpretation Summary    Left Ventricle: The left ventricle is normal in size. Ventricular mass is normal. Normal wall thickness. Normal wall motion. There is normal systolic function. Ejection fraction by visual approximation is 55%. There is normal diastolic function.    Left Atrium: Left atrium is mildly dilated. The left atrium volume index is 37.9 mL/m2.    Right Ventricle: Normal right ventricular cavity size. Wall thickness is normal. Right ventricle wall motion  is normal. Systolic function is normal.    Right Atrium: Normal right atrial size.    Aortic Valve: The aortic valve is structurally normal. There is normal leaflet mobility. There is no significant regurgitation.    Mitral Valve: The mitral valve is structurally normal. There is normal leaflet mobility. There is mild regurgitation.    Tricuspid Valve: The tricuspid valve is structurally normal. There is normal leaflet mobility. There is mild transvalvular regurgitation.    Pulmonic Valve: The pulmonic valve is structurally normal. There is normal leaflet mobility. There is no significant regurgitation.    Aorta: Aortic root is normal in size measuring 2.70 cm. Ascending aorta is normal.    Pulmonary Artery: There is pulmonary hypertension. The estimated pulmonary artery systolic pressure is 46 mmHg.    IVC/SVC: Intermediate venous pressure at 8 mmHg.    Pericardium: The pericardium is normal. There is no pericardial effusion.  - CT chest:    -Findings consistent with pulmonary edema including interlobular septal thickening, fissural thickening and bilateral pleural effusions.  - given 40mg of  lasix   --> continue diuresis with HCTZ   - started on dounebs   - one dose of IV steroids given

## 2023-08-03 NOTE — PLAN OF CARE
Problem: Occupational Therapy  Goal: Occupational Therapy Goal  Description: Goals to be met by: 8/10/23     Patient will increase functional independence with ADLs by performing:    UE Dressing with Modified Branch.  LE Dressing with Modified Branch.  Grooming while standing at sink with Stand-by Assistance.  Toileting from toilet with Modified Branch for hygiene and clothing management.   Toilet transfer to toilet with Stand-by Assistance.    Outcome: Ongoing, Progressing

## 2023-08-03 NOTE — NURSING
Pt is asleep easily to be arouse. No distress noted. No complaint of any pain at this time nor any hallucination. Pt has post op rocka bottom shoe on.Call light in reach. Bed in low locked position. Side rails x2. Belongings at bedside. Pt free of fall and injuries Questions and concerns voiced and answered.  Plan of care continues.

## 2023-08-03 NOTE — HOSPITAL COURSE
"Elly Harris was admitted to hospital medicine for symptomatic anemia. Hgb 4.1 on admission, improved to 6.1 following one unit and again to 8.8 following a second unit. Hgb stable at 7.7 on repeat. Will follow up chronic iron deficiency anemia with her outpatient hematologist through [facility]. Patient hypertensive on admission, question of medication compliance. Restarted home meds with minimal improvement. Increased losartan-HCTZ to 100mg-12.5mg. Plan to [place] with new order for BP cuff and instruction to complete BP log prior to PCP follow up in 1 week. Additionally, reports having visual hallucinations since starting zyprexa per opt psychiatry. Psych consulted and do not believe that her hallucinations are medication related, and recommend follow up with her outpatient psychiatrist through [facility]. Also while patient was in the ED, she got up to use to the restroom and tripped, hearing a "pop" in her foot. Xray showed an acute 4th metatarsal fracture. Ortho consulted, recommend weightbearing as tolerated with hard sole boot - provided. Therapy to assess functional status. Finally, patient reporting chronic worsening SOB. Echo with an EF of 55%, CVP of 8. CXR with nonspecific findings, CT chest consistent with pulmonary edema including interlobular septal thickening, fissural thickening and bilateral pleural effusions. Gave 40mg of Lasix with some improvement. Continue diuretic therapy with resumption of HCTZ at [place]. Started on douneb and IV steroids. States she is establishing care with a pulmonologist at [facility] this month. Therapy eval completed - recommending SNF given acute injury and instability with ambulation. Patient discharged to SNF in stable condition, with ortho and PCP follow up through [facility]. Remainder of care through [facility] as previously stated.   "

## 2023-08-03 NOTE — ASSESSMENT & PLAN NOTE
Symptomatic   Repeat HH   - Hgb stable at 7.7 following 2 Units   Transfuse for hgb less than 7  Anemia work up    - consistent with known iron deficiency anemia, start iron supplements      --> previously followed with hematology at [facility] - patient will re-establish care    - also found to be B12 deficient, start supplements daily

## 2023-08-03 NOTE — PT/OT/SLP EVAL
Occupational Therapy   Evaluation    Name: Elly Harris  MRN: [id number]  Admitting Diagnosis: Symptomatic anemia  Recent Surgery: * No surgery found *      Recommendations:     Discharge Recommendations: nursing facility, skilled  Discharge Equipment Recommendations:  walker, rolling, bedside commode, grab bar  Barriers to discharge:  Inaccessible home environment    Assessment:     Elly Harris is a 68 y.o. female with a medical diagnosis of Symptomatic anemia. Patient presents with the following impairments/functional limitations: weakness, impaired endurance, impaired functional mobility, gait instability, impaired self care skills, impaired balance, decreased safety awareness, orthopedic precautions. Patient completed lower body dressing with SBA while sitting supported in chair. Patient then ambulated to the bathroom and completed her oral hygiene routine while standing at the sink with CGA and a RW. Patient became increasingly fatigued/SOB and transferred to the toilet with min A and a RW. After a short rest break, patient ambulated back to chair with min A and a RW. Patient's functional mobility is primarily limited by poor endurance, gait instability, posterior/leftward lean, and poor balance from R LE darco shoe. Patient would benefit from continued skilled acute OT services at this time to address ADLs, poor endurance, standing balance, and gait instability functional mobility. OT to recommend SNF services at d/c once medically appropriate to improve ADL independence, functional mobility, and quality of life.      Rehab Prognosis: Good; patient would benefit from acute skilled OT services to address these deficits and reach maximum level of function.       Plan:     Patient to be seen 3 x/week to address the above listed problems via self-care/home management, therapeutic activities, therapeutic exercises, neuromuscular re-education  Plan of Care Expires: 09/02/23  Plan of Care Reviewed with:  "patient    Subjective     Chief Complaint: Instability from darco shoe and fatigue   Patient/Family Comments/goals: Patient: "I feel off balance in this shoe."    Occupational Profile:  Living Environment: Patient lives with her daughter and son in a H with 5 ERIC and BHR. Patient's bathroom has a walk-in shower.   Previous level of function: Prior to hospitalization, patient was driving and independent with ADLs. Patient used a rollator for ambulation. Patient has fallen once in the past 3 months.  Roles and Routines: Mother, caretaker   Equipment Used at Home: rollator  Assistance upon Discharge: Patient does not have assistance upon discharge. Patient's daughter and brother are disabled and she serves as their primary caretakers. Patient's son works full time.     Pain/Comfort:  Pain Rating 1: 0/10    Patients cultural, spiritual, Yazidism conflicts given the current situation:      Objective:     Communicated with: RN prior to session.  Patient found up in chair with PureWick, peripheral IV upon OT entry to room.    General Precautions: Standard, fall  Orthopedic Precautions: RLE weight bearing as tolerated  Braces:  Darco Shoe for R LE (4th metatarsal fracture)  Respiratory Status: Room air    Occupational Performance:    Bed Mobility:    Bed mobility not observed. Patient found up in the chair.     Functional Mobility/Transfers:  Patient completed Sit <> Stand Transfer from chair x 2 trials with minimum assistance and a rolling walker   Patient completed step transfer from sink to toilet with min A and a rolling walker   Patient completed sit <> stand transfer from toilet with max A and a rolling walker   Functional Mobility: Patient ambulated from chair > bathroom > chair with CGA-min A and a RW. Patient required sitting rest break on the toilet secondary to instability and fatigue.    Activities of Daily Living:  Grooming: Patient brushed her teeth while standing at the sink with CGA-min A and a " RW.  Lower Body Dressing: Patient doffed/donned sock/shoe with SBA while sitting supported in chair.    Cognitive/Visual Perceptual:  Cognitive/Psychosocial Skills:     -       Oriented to: Person, Place, Time, and Situation   -       Follows Commands/attention:Follows multistep  commands  -       Communication: clear/fluent  -       Memory: No Deficits noted  -       Safety awareness/insight to disability: impaired   -       Mood/Affect/Coping skills/emotional control: Appropriate to situation and Cooperative    Physical Exam:  Upper Extremity Range of Motion:     -       Right Upper Extremity: WNL  -       Left Upper Extremity: WNL  Upper Extremity Strength:    -       Right Upper Extremity: WNL  -       Left Upper Extremity: WNL   Strength:    -       Right Upper Extremity: WNL  -       Left Upper Extremity: WNL    AMPAC 6 Click ADL:  AMPAC Total Score: 20    Treatment & Education:  UE ROM/MMT  Bed mobility training / assessment  Sit/standing balance assessment  Functional mobility assessment  Patient was educated on how to ambulate with a walker.  OT plan of care discussed with patient.      Patient left up in chair with all lines intact, call button in reach, and RN notified    GOALS:   Multidisciplinary Problems       Occupational Therapy Goals          Problem: Occupational Therapy    Goal Priority Disciplines Outcome Interventions   Occupational Therapy Goal     OT, PT/OT Ongoing, Progressing    Description: Goals to be met by: 8/10/23     Patient will increase functional independence with ADLs by performing:    UE Dressing with Modified [place].  LE Dressing with Modified [place].  Grooming while standing at sink with Stand-by Assistance.  Toileting from toilet with Modified [place] for hygiene and clothing management.   Toilet transfer to toilet with Stand-by Assistance.                         History:     Past Medical History:   Diagnosis Date    Anemia     Depression     Fibromyalgia      GERD (gastroesophageal reflux disease)     Herpes zoster infection of thoracic region 2014    left    Hyperlipidemia     Hypertension     Migraine headache     RLS (restless legs syndrome)          Past Surgical History:   Procedure Laterality Date    BILATERAL SALPINGOOPHORECTOMY       SECTION      CLOSED REDUCTION OF FRACTURE OF NASAL BONE Bilateral 2021    Procedure: CLOSED REDUCTION, FRACTURE, NASAL BONE;  Surgeon: JV Connelly MD;  Location: [facility] OR;  Service: ENT;  Laterality: Bilateral;    CLOSED REDUCTION OF FRACTURE OF NASAL BONE Bilateral 2021    Procedure: CLOSED REDUCTION, FRACTURE, NASAL BONE;  Surgeon: JV Connelly MD;  Location: [facility] OR;  Service: ENT;  Laterality: Bilateral;    DEBRIDEMENT TENNIS ELBOW      FEMUR SURGERY  2013    FOOT NEUROMA SURGERY Bilateral     with hammertoe repair    HYSTERECTOMY      vaginal    LASIK      MANDIBLE FRACTURE SURGERY      TONSILLECTOMY      TOTAL VAGINAL HYSTERECTOMY         Time Tracking:     OT Date of Treatment: 23  OT Start Time: 1345  OT Stop Time: 1405  OT Total Time (min): 20 min    Billable Minutes:  Evaluation: 8  Self Care/Home Management 12    8/3/2023

## 2023-08-03 NOTE — ASSESSMENT & PLAN NOTE
- right foot with acute 4th metatarsal fracture  - ortho consulted:    -Recommend patient remain weight-bearing as tolerated in a hard-sole postop shoe with rocker bottom along with a multimodal pain regimen.  Daily physical therapy; allow patient to ambulate with normal shoe on left foot to avoid length-discrepancy when up and walking. We will have her follow-up in outpatient trauma clinic (patient will be contacted with scheduling information).  - PT/OT rec SNF

## 2023-08-03 NOTE — ASSESSMENT & PLAN NOTE
Restart home medications, check lamotrigine level  Hold Zyprexa on admit --> zyprexa resumed per psych recs   - psych does not believe VH are due to zyprexa - recommend continuation of home meds and close psych clinic follow up

## 2023-08-03 NOTE — SUBJECTIVE & OBJECTIVE
Interval History: NAEON. AF. Uncontrolled hypertension despite resuming home BP meds. Making dose adjustments today. Patient received ortho boot. PT assessed and recommends SNF for gait instability. CM assisting.    Review of Systems   Constitutional:  Positive for fatigue. Negative for activity change, chills and fever.   HENT: Negative.  Negative for trouble swallowing.    Eyes: Negative.  Negative for photophobia and visual disturbance.   Respiratory:  Positive for shortness of breath and wheezing. Negative for cough and chest tightness.    Cardiovascular: Negative.  Negative for chest pain, palpitations and leg swelling.   Gastrointestinal: Negative.  Negative for abdominal pain, constipation, diarrhea, nausea and vomiting.   Genitourinary:  Negative for dysuria, frequency and hematuria.   Musculoskeletal:  Positive for arthralgias and joint swelling. Negative for back pain, gait problem and neck pain.   Skin:  Negative for rash and wound.   Neurological:  Negative for dizziness, syncope, speech difficulty and light-headedness.   Psychiatric/Behavioral:  Positive for confusion and hallucinations (visual). Negative for agitation. The patient is not nervous/anxious.      Objective:     Vital Signs (Most Recent):  Temp: 97.4 °F (36.3 °C) (08/03/23 1151)  Pulse: 71 (08/03/23 1218)  Resp: 16 (08/03/23 1218)  BP: 131/60 (08/03/23 1151)  SpO2: 97 % (08/03/23 1218) Vital Signs (24h Range):  Temp:  [97.4 °F (36.3 °C)-99.3 °F (37.4 °C)] 97.4 °F (36.3 °C)  Pulse:  [65-84] 71  Resp:  [12-20] 16  SpO2:  [93 %-100 %] 97 %  BP: (131-190)/() 131/60     Weight: 78.5 kg (173 lb)  Body mass index is 27.1 kg/m².    Intake/Output Summary (Last 24 hours) at 8/3/2023 1856  Last data filed at 8/3/2023 0537  Gross per 24 hour   Intake --   Output 2650 ml   Net -2650 ml         Physical Exam  Constitutional:       Appearance: Normal appearance. She is obese.   HENT:      Head: Atraumatic.      Right Ear: External ear normal.       Left Ear: External ear normal.      Nose: Nose normal.      Mouth/Throat:      Mouth: Mucous membranes are dry.      Pharynx: Oropharynx is clear. No oropharyngeal exudate or posterior oropharyngeal erythema.   Eyes:      Extraocular Movements: Extraocular movements intact.      Pupils: Pupils are equal, round, and reactive to light.      Comments: Pale conjunctivae   Cardiovascular:      Rate and Rhythm: Normal rate.      Pulses: Normal pulses.      Heart sounds: Murmur heard.   Pulmonary:      Effort: Pulmonary effort is normal.      Breath sounds: Wheezing present.   Abdominal:      Palpations: Abdomen is soft.      Tenderness: There is no abdominal tenderness. There is no guarding.      Hernia: No hernia is present.   Musculoskeletal:         General: No signs of injury. Normal range of motion.      Cervical back: Normal range of motion. No rigidity.      Right lower leg: No edema.      Left lower leg: No edema.   Lymphadenopathy:      Cervical: No cervical adenopathy.   Skin:     General: Skin is warm and dry.      Capillary Refill: Capillary refill takes 2 to 3 seconds.      Coloration: Skin is not pale.   Neurological:      General: No focal deficit present.      Mental Status: She is alert and oriented to person, place, and time. Mental status is at baseline.   Psychiatric:         Mood and Affect: Mood normal.             Significant Labs: All pertinent labs within the past 24 hours have been reviewed.  BMP:   Recent Labs   Lab 08/03/23  0243   *      K 4.1      CO2 25   BUN 22   CREATININE 1.2   CALCIUM 9.1   MG 1.9     CBC:   Recent Labs   Lab 08/02/23  1352 08/02/23  1846 08/03/23  0243   WBC 9.16 9.14 6.17   HGB 7.7* 8.8* 7.7*   HCT 28.9* 29.1* 25.7*    214 170       Significant Imaging: I have reviewed all pertinent imaging results/findings within the past 24 hours.    Imaging Results              X-Ray Pelvis Routine AP (Final result)  Result time 08/03/23 00:07:30      Final  result by Hermila Jimenez MD (08/03/23 00:07:30)                   Impression:      No acute abnormality involving the pelvis as imaged.      Electronically signed by: Hermila Jimenez  Date:    08/03/2023  Time:    00:07               Narrative:    EXAMINATION:  XR PELVIS ROUTINE AP    CLINICAL HISTORY:  fall from standing onto right side w/ right hip pain;    TECHNIQUE:  AP view of the pelvis was performed.    COMPARISON:  None.    FINDINGS:  There is no evidence of acute fracture or dislocation on this limited AP pelvis exam.  Inter medullary right femoral neil transfixed by inter trochanteric screw grossly appears intact as imaged.  Hip joint spaces bilaterally appear symmetric and well maintained.  SI joints appear symmetric and pubic symphysis grossly appears intact.  Bowel gas pattern pelvic calcifications are unremarkable.                                       X-Ray Tibia Fibula 2 View Right (Final result)  Result time 08/02/23 23:53:05      Final result by Melissa Aguirre MD (08/02/23 23:53:05)                   Impression:      No acute bony abnormality detected.      Electronically signed by: Melissa Aguirre  Date:    08/02/2023  Time:    23:53               Narrative:    EXAMINATION:  TWO VIEWS OF THE RIGHT TIBIA AND FIBULA    CLINICAL HISTORY:  fall with right 4th metatarsal fx;    TECHNIQUE:  AP and lateral view of the right tibia and fibula    COMPARISON:  None.    FINDINGS:  Two views of the right tibia and fibula demonstrate no acute fracture or dislocation.  There are healed fractures of the distal fibula.  The bones are diffusely osteopenic.                                       X-Ray Foot Complete Right (Final result)  Result time 08/02/23 12:16:16   Procedure changed from X-Ray Foot 2 View Right     Final result by Russell Cleaning MD (08/02/23 12:16:16)                   Impression:      Fracture of the 4th metatarsal      Electronically signed by: Russell Cleaning,  MD  Date:    08/02/2023  Time:    12:16               Narrative:    EXAMINATION:  XR FOOT COMPLETE 3 VIEW RIGHT    CLINICAL HISTORY:  Fell, now have foot pain;.    TECHNIQUE:  AP, lateral, and oblique views of the right foot were performed.    COMPARISON:  07/11/2017    FINDINGS:  There is acute fracture through the mid 4th metatarsal with some angulation present.  Old healed fracture of the proximal 2nd metatarsal can be seen.  Plantar calcaneal spurs are noted.                                       CT Chest Without Contrast (Final result)  Result time 08/02/23 13:19:06      Final result by Inderjit Lara MD (08/02/23 13:19:06)                   Impression:      Findings consistent with pulmonary edema including interlobular septal thickening, fissural thickening and bilateral pleural effusions.    Solid noncalcified 9 mm nodule in the left lower lobe.  For a solid nodule >8 mm, Fleischner Society 2017 guidelines recommend considering CT, PET/CT or tissue sampling at 3 months.    Calcified material in the esophagus, recommend correlation with possible retention of medications.    Additional findings as above.    Electronically signed by resident: Jc Garcia  Date:    08/02/2023  Time:    11:22    Electronically signed by: Inderjit Lara  Date:    08/02/2023  Time:    13:19               Narrative:    EXAMINATION:  CT CHEST WITHOUT CONTRAST    CLINICAL HISTORY:  Abnormal xray - lung nodule, < 1 cm, low risk;    TECHNIQUE:  Low dose axial images, sagittal and coronal reformations were obtained from the thoracic inlet to the lung bases. Contrast was not administered.    COMPARISON:  CT chest 10/11/2019, chest radiograph 08/02/2023.    CT of the thoracic spine dated 03/17/2020    FINDINGS:  Base of Neck and thoracic soft tissue: Subcentimeter  lymph node measuring 0.9 cm in station 1.  No pathologically enlarged lymph nodes.  No other significant abnormality.    Aorta: Left-sided aortic arch.  No  aneurysm.  Mild scattered atherosclerosis.    Heart: Normal size. No effusion.    Pulmonary vasculature: Pulmonary arteries distribute normally.  There are four pulmonary veins.    Christine/Mediastinum: No pathologic bety enlargement.  Subcentimeter lymph nodes in mediastinum.    Lungs/Pleura/airways: Central airways are patent.  There are small bilateral pleural effusions, right greater than left.  Thickening of the right minor fissure with adjacent platelike atelectasis.  There is interlobular septal thickening and diffuse mosaic attenuation.  Tiny calcified granuloma in the right upper lobe.  There are no focal consolidations.  Solid noncalcified nodule measuring 9 x 9 mm begin (mean diameter 9 mm) located in the left lower lobe (axial image 288 series 4).    Esophagus: Rounded hyperdense material in the esophagus, may represent retained medications.    Upper Abdomen: Multiple rounded hepatic hypodensities the largest of which is in the left lobe measuring 3.6 cm.  These are favored to represent hepatic cysts.    Bones: Fracture deformity of multiple posterior left ribs, age indeterminate.  Degenerative changes in the spine, including wedge deformity of T8 and T12 grossly unchanged respect to the CT of the thoracic spine dated 03/17/2020.                                       X-Ray Chest AP Portable (Final result)  Result time 08/02/23 01:38:36      Final result by Jc Shearer MD (08/02/23 01:38:36)                   Impression:      Radiographic findings as above.      Electronically signed by: Jc Shearer  Date:    08/02/2023  Time:    01:38               Narrative:    EXAMINATION:  XR CHEST AP PORTABLE    CLINICAL HISTORY:  post tranfusion pulm edema;    TECHNIQUE:  Single frontal view of the chest was performed.    COMPARISON:  None    FINDINGS:  Single portable chest view is submitted.  The heart size appears upper normal.  Mild aortic atherosclerotic change noted.    There is prominence of the  central pulmonary vascular.  There is bilateral pattern of interstitial, ground-glass and patchy alveolar infiltrates, right greater than left.    There is no evidence for pleural effusion and there is no evidence for pneumothorax.    There are remote appearing rib fractures on the left, indeterminate age rib fracture of the posterior left 3rd rib.  The osseous structures otherwise appear intact.                                       CT Head Without Contrast (Final result)  Result time 08/02/23 00:26:08      Final result by Hermila Jimenez MD (08/02/23 00:26:08)                   Impression:      No acute intracranial abnormality or acute fracture.    Interval development of left mastoiditis.    Nasal bone fractures bilaterally appear remote with no overlying soft tissue swelling seen.    Senescent atrophic changes.      Electronically signed by: Hermila Jimenez  Date:    08/02/2023  Time:    00:26               Narrative:    EXAMINATION:  CT HEAD WITHOUT CONTRAST    CLINICAL HISTORY:  Mental status change, unknown cause;    TECHNIQUE:  Low dose axial images were obtained through the head.  Coronal and sagittal reformations were also performed. Contrast was not administered.    COMPARISON:  09/04/2021 CT brain    FINDINGS:  There is no evidence of acute intra or extra-axial hemorrhage or hematoma.  The gray-white matter junction differentiation is intact with no evidence of acute major arterial infarct.  There is prominence of the ventricles, cisterns and sulci consistent with senescent atrophic changes.  There is no hydrocephalus.    The posterior fossa structures appear unremarkable as imaged.  There is a empty sella configuration of the pituitary gland.    There is no focal mass or mass effect.    There is interval development of fluid in multiple mastoid air cells in the mastoid tip on the left compatible mastoiditis.  The remaining mastoid air cells and middle ears appear clear.  Paranasal sinuses appear  clear.  Orbital structures appear symmetric and the bony calvarium appears stable/intact.  There are nasal bone fractures bilaterally mildly depressed on the right.  There is no overlying soft tissue swelling to suggest acute fracture.  Facial bones otherwise are unremarkable.

## 2023-08-03 NOTE — ASSESSMENT & PLAN NOTE
Could be side effect of Zyprexa or secondary to bipolar disorder now with psychosis futures  - consulted psych:    -As patient is not having symptoms consistent with her underlying psychiatric disorder, recommend workup/correction of her underlying medical issues per primary team (anemia, B12 deficiency) and continuing her outpatient psychiatric therapeutic regimen.    - follow up with outpatient psych

## 2023-08-03 NOTE — PT/OT/SLP EVAL
"Physical Therapy Evaluation    Patient Name:  Elly Harris   MRN:  [id number]    Recommendations:     Discharge Recommendations: nursing facility, skilled   Discharge Equipment Recommendations: bedside commode, walker, rolling   Barriers to discharge: Inaccessible home and Decreased caregiver support lives with her daughter and brother who are disabled and unable to assist; has 5 ERIC with rail    Assessment:     Elly Harris is a 68 y.o. female admitted with a medical diagnosis of Symptomatic anemia.  She presents with the following impairments/functional limitations: weakness, impaired functional mobility, gait instability, impaired balance, decreased lower extremity function, impaired self care skills, decreased coordination, decreased safety awareness . Pt is unsafe with functional mobility at this time due to pt requires no assist for bed mobility, CGA for transfers, moderate assist for up/down steps, and minimal assist for gait due to posterior instability. Pt is motivated to progress with functional mobility.     Rehab Prognosis: Good; patient would benefit from acute skilled PT services to address these deficits and reach maximum level of function.    Recent Surgery: * No surgery found *      Plan:     During this hospitalization, patient to be seen 4 x/week to address the identified rehab impairments via gait training, therapeutic activities, therapeutic exercises, neuromuscular re-education and progress toward the following goals:    Plan of Care Expires:  09/02/23    Subjective   "I have fallen down the stairs a few times, my daughter has also"    Pain/Comfort:  Pain Rating 1: 0/10 (pt did not c/o pain during treatment)  Pain Rating Post-Intervention 1: 0/10    Patients cultural, spiritual, Mosque conflicts given the current situation: no    Living Environment:  Pt lives with her daughter and brother in a 1 story home with 5 ERIC with B UE rails (too wide to use both)  Prior to admission, patients " level of function was independent with rollator.  Equipment used at home: rollator.    Upon discharge, patient will have assistance from no one due to daughter and brother are disabled.    Objective:     Communicated with nurse prior to session.  Patient found HOB elevated with PureWick  upon PT entry to room.    General Precautions: Standard, fall  Orthopedic Precautions:RLE weight bearing as tolerated (in darco wedge shoe/ hard sole post op rocker bottom shoe)   Braces:  (darco wedge shoe)  Respiratory Status: Room air    Exams:  Cognitive Exam:  Patient is oriented to Person, Place, and Time  Sensation:    -       Intact  light/touch B LE  RLE ROM: WFL except ankle DF NT due to recent MT fracture  RLE Strength: WFL except ankle DF NT due to recent MT fracture  LLE ROM: WFL  LLE Strength: WFL    Functional Mobility:  Bed Mobility:     Supine to Sit: modified independence  Transfers:     Sit to Stand:  contact guard assistance with rolling walker  Gait: 30ft with RW with WBAT R LE in darco wedge shoe with minimal assist. Pt with difficulty coordinating stepping with darco wedge shoe and required verbal cues for sequencing and manual cues to avoid LOB posterior.Pt required minimal assist to perform stand to sit due to pt leaning posterior.  Stairs:  Pt ascended/descended 5 stair(s) with No Assistive Device with B UE support on the right handrail with Moderate Assistance. Pt with difficulty with placing the R foot on the step properly requiring verbal and manual cues. Pt had LOB posterior and to the R going up and down steps.     AM-PAC 6 CLICK MOBILITY  Total Score:18     Patient left up in chair with all lines intact, call button in reach, and nurse notified.    GOALS:   Multidisciplinary Problems       Physical Therapy Goals          Problem: Physical Therapy    Goal Priority Disciplines Outcome Goal Variances Interventions   Physical Therapy Goal     PT, PT/OT Ongoing, Progressing     Description: PT goals until  23    1. Pt sit to stand with RW with WBAT in R darco shoe with mod independent-not met  2. Pt to perform gait 100ft with WBAT in R darco shoe with RW with supervision.-not met  3. Pt to up/down 5 steps with R UE rail with WBAT in R darco shoe with CGA.-not met  4. Pt to perform B LE exs in sitting or supine x 10 reps to strengthen B LE to improve functional mobility.-not met                        History:     Past Medical History:   Diagnosis Date    Anemia     Depression     Fibromyalgia     GERD (gastroesophageal reflux disease)     Herpes zoster infection of thoracic region 2014    left    Hyperlipidemia     Hypertension     Migraine headache     RLS (restless legs syndrome)        Past Surgical History:   Procedure Laterality Date    BILATERAL SALPINGOOPHORECTOMY       SECTION      CLOSED REDUCTION OF FRACTURE OF NASAL BONE Bilateral 2021    Procedure: CLOSED REDUCTION, FRACTURE, NASAL BONE;  Surgeon: JV Connelly MD;  Location: [facility] OR;  Service: ENT;  Laterality: Bilateral;    CLOSED REDUCTION OF FRACTURE OF NASAL BONE Bilateral 2021    Procedure: CLOSED REDUCTION, FRACTURE, NASAL BONE;  Surgeon: JV Connelly MD;  Location: [facility] OR;  Service: ENT;  Laterality: Bilateral;    DEBRIDEMENT TENNIS ELBOW      FEMUR SURGERY  2013    FOOT NEUROMA SURGERY Bilateral     with hammertoe repair    HYSTERECTOMY      vaginal    LASIK      MANDIBLE FRACTURE SURGERY      TONSILLECTOMY      TOTAL VAGINAL HYSTERECTOMY         Time Tracking:     PT Received On: 23  PT Start Time: 1025     PT Stop Time: 1050  PT Total Time (min): 25 min     Billable Minutes: Evaluation 15 and Gait Training 10      2023

## 2023-08-03 NOTE — ASSESSMENT & PLAN NOTE
Uncontrolled   Continue BB.    - pt hypertensive on home meds but unclear if compliant with ACEi; restart losartan-HCTZ    - Increased losartan-HCTZ to 100mg-12.5mg  PCP follow up in 1 week   BP cuff ordered; education patient on keeping BP log to review at clinic f/u

## 2023-08-03 NOTE — PROGRESS NOTES
Health Maintenance Due   Topic Date Due    Hemoglobin A1c (Diabetic Prevention Screening)  Never done    Shingles Vaccine (1 of 2) Never done    Mammogram  05/25/2013    DEXA Scan  10/18/2018    Pneumococcal Vaccines (Age 65+) (3 - PPSV23 or PCV20) 03/08/2019    Lipid Panel  03/17/2021    COVID-19 Vaccine (4 - Moderna series) 12/22/2021     Chart reviewed.   Immunizations: Reconciled  Orders placed: N/A  Upcoming appts to satisfy JULIO topics: N/A

## 2023-08-04 LAB
ALBUMIN SERPL BCP-MCNC: 3.3 G/DL (ref 3.5–5.2)
ALP SERPL-CCNC: 121 U/L (ref 55–135)
ALT SERPL W/O P-5'-P-CCNC: 12 U/L (ref 10–44)
ANION GAP SERPL CALC-SCNC: 10 MMOL/L (ref 8–16)
AST SERPL-CCNC: 10 U/L (ref 10–40)
BASOPHILS # BLD AUTO: 0.05 K/UL (ref 0–0.2)
BASOPHILS NFR BLD: 0.6 % (ref 0–1.9)
BILIRUB SERPL-MCNC: 0.5 MG/DL (ref 0.1–1)
BUN SERPL-MCNC: 29 MG/DL (ref 8–23)
CALCIUM SERPL-MCNC: 9.2 MG/DL (ref 8.7–10.5)
CHLORIDE SERPL-SCNC: 103 MMOL/L (ref 95–110)
CO2 SERPL-SCNC: 29 MMOL/L (ref 23–29)
CREAT SERPL-MCNC: 1.3 MG/DL (ref 0.5–1.4)
DIFFERENTIAL METHOD: ABNORMAL
EOSINOPHIL # BLD AUTO: 0.5 K/UL (ref 0–0.5)
EOSINOPHIL NFR BLD: 5.6 % (ref 0–8)
ERYTHROCYTE [DISTWIDTH] IN BLOOD BY AUTOMATED COUNT: 27 % (ref 11.5–14.5)
EST. GFR  (NO RACE VARIABLE): 44.5 ML/MIN/1.73 M^2
GLUCOSE SERPL-MCNC: 89 MG/DL (ref 70–110)
HCT VFR BLD AUTO: 28.2 % (ref 37–48.5)
HGB BLD-MCNC: 7.9 G/DL (ref 12–16)
IMM GRANULOCYTES # BLD AUTO: 0.03 K/UL (ref 0–0.04)
IMM GRANULOCYTES NFR BLD AUTO: 0.4 % (ref 0–0.5)
LAMOTRIGINE SERPL-MCNC: 9.8 UG/ML (ref 2–15)
LYMPHOCYTES # BLD AUTO: 1.6 K/UL (ref 1–4.8)
LYMPHOCYTES NFR BLD: 19.6 % (ref 18–48)
MAGNESIUM SERPL-MCNC: 2.2 MG/DL (ref 1.6–2.6)
MCH RBC QN AUTO: 20.5 PG (ref 27–31)
MCHC RBC AUTO-ENTMCNC: 28 G/DL (ref 32–36)
MCV RBC AUTO: 73 FL (ref 82–98)
MONOCYTES # BLD AUTO: 0.8 K/UL (ref 0.3–1)
MONOCYTES NFR BLD: 9.4 % (ref 4–15)
NEUTROPHILS # BLD AUTO: 5.2 K/UL (ref 1.8–7.7)
NEUTROPHILS NFR BLD: 64.4 % (ref 38–73)
NRBC BLD-RTO: 0 /100 WBC
PHOSPHATE SERPL-MCNC: 4.1 MG/DL (ref 2.7–4.5)
PLATELET # BLD AUTO: 210 K/UL (ref 150–450)
PMV BLD AUTO: ABNORMAL FL (ref 9.2–12.9)
POTASSIUM SERPL-SCNC: 4.4 MMOL/L (ref 3.5–5.1)
PROT SERPL-MCNC: 5.8 G/DL (ref 6–8.4)
RBC # BLD AUTO: 3.86 M/UL (ref 4–5.4)
SARS-COV-2 RNA RESP QL NAA+PROBE: NOT DETECTED
SODIUM SERPL-SCNC: 142 MMOL/L (ref 136–145)
WBC # BLD AUTO: 8 K/UL (ref 3.9–12.7)

## 2023-08-04 PROCEDURE — 30200315 PPD INTRADERMAL TEST REV CODE 302: Mod: HCNC

## 2023-08-04 PROCEDURE — 94761 N-INVAS EAR/PLS OXIMETRY MLT: CPT | Mod: HCNC

## 2023-08-04 PROCEDURE — 99900031 HC PATIENT EDUCATION (STAT): Mod: HCNC

## 2023-08-04 PROCEDURE — 27000190 HC CPAP FULL FACE MASK W/VALVE: Mod: HCNC

## 2023-08-04 PROCEDURE — G0378 HOSPITAL OBSERVATION PER HR: HCPCS | Mod: HCNC

## 2023-08-04 PROCEDURE — 99900035 HC TECH TIME PER 15 MIN (STAT): Mod: HCNC

## 2023-08-04 PROCEDURE — 25000003 PHARM REV CODE 250: Mod: HCNC

## 2023-08-04 PROCEDURE — 94660 CPAP INITIATION&MGMT: CPT | Mod: HCNC

## 2023-08-04 PROCEDURE — 97116 GAIT TRAINING THERAPY: CPT | Mod: HCNC

## 2023-08-04 PROCEDURE — 99232 SBSQ HOSP IP/OBS MODERATE 35: CPT | Mod: HCNC,,,

## 2023-08-04 PROCEDURE — 97110 THERAPEUTIC EXERCISES: CPT | Mod: HCNC

## 2023-08-04 PROCEDURE — 99232 PR SUBSEQUENT HOSPITAL CARE,LEVL II: ICD-10-PCS | Mod: HCNC,,,

## 2023-08-04 PROCEDURE — 86580 TB INTRADERMAL TEST: CPT | Mod: HCNC

## 2023-08-04 PROCEDURE — 36415 COLL VENOUS BLD VENIPUNCTURE: CPT | Mod: HCNC

## 2023-08-04 PROCEDURE — 80053 COMPREHEN METABOLIC PANEL: CPT | Mod: HCNC

## 2023-08-04 PROCEDURE — 25000003 PHARM REV CODE 250: Mod: HCNC | Performed by: STUDENT IN AN ORGANIZED HEALTH CARE EDUCATION/TRAINING PROGRAM

## 2023-08-04 PROCEDURE — 85025 COMPLETE CBC W/AUTO DIFF WBC: CPT | Mod: HCNC

## 2023-08-04 PROCEDURE — 94640 AIRWAY INHALATION TREATMENT: CPT | Mod: HCNC

## 2023-08-04 PROCEDURE — 84100 ASSAY OF PHOSPHORUS: CPT | Mod: HCNC

## 2023-08-04 PROCEDURE — 25000242 PHARM REV CODE 250 ALT 637 W/ HCPCS: Mod: HCNC | Performed by: INTERNAL MEDICINE

## 2023-08-04 PROCEDURE — 99203 PR OFFICE/OUTPT VISIT, NEW, LEVL III, 30-44 MIN: ICD-10-PCS | Mod: HCNC,GC,, | Performed by: ORTHOPAEDIC SURGERY

## 2023-08-04 PROCEDURE — 87635 SARS-COV-2 COVID-19 AMP PRB: CPT | Mod: HCNC

## 2023-08-04 PROCEDURE — 99203 OFFICE O/P NEW LOW 30 MIN: CPT | Mod: HCNC,GC,, | Performed by: ORTHOPAEDIC SURGERY

## 2023-08-04 PROCEDURE — 83735 ASSAY OF MAGNESIUM: CPT | Mod: HCNC

## 2023-08-04 RX ADMIN — METOPROLOL SUCCINATE 100 MG: 100 TABLET, EXTENDED RELEASE ORAL at 09:08

## 2023-08-04 RX ADMIN — CYANOCOBALAMIN TAB 250 MCG 250 MCG: 250 TAB at 08:08

## 2023-08-04 RX ADMIN — LAMOTRIGINE 200 MG: 100 TABLET ORAL at 09:08

## 2023-08-04 RX ADMIN — GABAPENTIN 200 MG: 100 CAPSULE ORAL at 08:08

## 2023-08-04 RX ADMIN — LAMOTRIGINE 200 MG: 100 TABLET ORAL at 08:08

## 2023-08-04 RX ADMIN — TUBERCULIN PURIFIED PROTEIN DERIVATIVE 5 UNITS: 5 INJECTION, SOLUTION INTRADERMAL at 05:08

## 2023-08-04 RX ADMIN — LOSARTAN POTASSIUM AND HYDROCHLOROTHIAZIDE 1 TABLET: 100; 12.5 TABLET, FILM COATED ORAL at 08:08

## 2023-08-04 RX ADMIN — FERROUS SULFATE TAB EC 325 MG (65 MG FE EQUIVALENT) 1 EACH: 325 (65 FE) TABLET DELAYED RESPONSE at 09:08

## 2023-08-04 RX ADMIN — ARIPIPRAZOLE 15 MG: 15 TABLET ORAL at 08:08

## 2023-08-04 RX ADMIN — IPRATROPIUM BROMIDE AND ALBUTEROL SULFATE 3 ML: .5; 3 SOLUTION RESPIRATORY (INHALATION) at 08:08

## 2023-08-04 RX ADMIN — ATORVASTATIN CALCIUM 20 MG: 20 TABLET, FILM COATED ORAL at 09:08

## 2023-08-04 RX ADMIN — DULOXETINE HYDROCHLORIDE 60 MG: 60 CAPSULE, DELAYED RELEASE ORAL at 08:08

## 2023-08-04 NOTE — PLAN OF CARE
[facility]      HOME HEALTH ORDERS  FACE TO FACE ENCOUNTER    Patient Name: Elly Harris  YOB: 1955    PCP: Srikanth Preston MD   PCP Address: [address] / [place] [address]  PCP Phone Number: [phone]  PCP Fax: [phone]    Encounter Date: 8/1/23    Admit to Home Health    Diagnoses:  Active Hospital Problems    Diagnosis  POA    *Symptomatic anemia [D64.9]  Yes    SOB (shortness of breath) [R06.02]  Yes    Psychosis [F29]  Yes    Bipolar 2 disorder [F31.81]  Yes    Essential hypertension [I10]  Yes    Thrombocytopenia [D69.6]  Yes    Closed displaced fracture of fourth metatarsal bone of right foot [S92.341A]  Yes    Microcytic anemia [D50.9]  Yes    GERD (gastroesophageal reflux disease) [K21.9]  Yes    Hyperlipidemia [E78.5]  Yes    Depression [F32.A]  Yes    Fibromyalgia [M79.7]  Yes     Chronic      Resolved Hospital Problems   No resolved problems to display.       Follow Up Appointments:  Future Appointments   Date Time Provider Department Center   8/10/2023 10:30 AM Srikanth Preston MD [facility]   10/12/2023 10:40 AM JV Connelly MD [facility]       Allergies:  Review of patient's allergies indicates:   Allergen Reactions    Doxycycline Rash and Hives       Medications: Review discharge medications with patient and family and provide education.    Current Facility-Administered Medications   Medication Dose Route Frequency Provider Last Rate Last Admin    0.9%  NaCl infusion (for blood administration)   Intravenous Q24H PRN Yi Tapia  mL/hr at 08/02/23 0656 New Bag at 08/02/23 0656    acetaminophen tablet 650 mg  650 mg Oral Q4H PRN Joan Mcintosh PA-C        albuterol-ipratropium 2.5 mg-0.5 mg/3 mL nebulizer solution 3 mL  3 mL Nebulization Q4H PRN Jorge Curry MD        ARIPiprazole tablet 15 mg  15 mg Oral Daily Yi Tapia, DO   15 mg at 08/04/23 0859    atorvastatin tablet 20 mg  20 mg Oral  Daily Yi Tapia, DO   20 mg at 08/04/23 0901    bisacodyL suppository 10 mg  10 mg Rectal Daily PRN Joan Mcintosh PA-C        cyanocobalamin tablet 250 mcg  250 mcg Oral Daily Joan Mcintosh PA-C   250 mcg at 08/04/23 0859    DULoxetine DR capsule 60 mg  60 mg Oral Daily Yi Tapia, DO   60 mg at 08/04/23 0859    ferrous sulfate tablet 1 each  1 tablet Oral Daily Joan Mcintosh PA-C   1 each at 08/04/23 0900    gabapentin capsule 200 mg  200 mg Oral BID Joan Mcintosh PA-C   200 mg at 08/04/23 0859    glucagon (human recombinant) injection 1 mg  1 mg Intramuscular PRN Joan Mcintosh PA-C        glucose chewable tablet 16 g  16 g Oral PRN Joan Mcintosh PA-C        glucose chewable tablet 24 g  24 g Oral PRN Joan Mcintosh PA-C        hydrALAZINE tablet 25 mg  25 mg Oral Q8H PRN Joan Mcintosh PA-C   25 mg at 08/02/23 1621    lamoTRIgine tablet 200 mg  200 mg Oral BID Yi Tapia DO   200 mg at 08/04/23 0905    LORazepam tablet 0.5 mg  0.5 mg Oral Daily PRN Joan Mcintosh PA-C        losartan-hydrochlorothiazide 100-12.5 mg per tablet 1 tablet  1 tablet Oral Daily Sanam Dumont PA-C   1 tablet at 08/04/23 0859    melatonin tablet 6 mg  6 mg Oral Nightly PRN Joan Mcintosh PA-C        metoprolol succinate (TOPROL-XL) 24 hr tablet 100 mg  100 mg Oral Daily Yi Tapia, DO   100 mg at 08/04/23 0900    ondansetron disintegrating tablet 8 mg  8 mg Oral Q8H PRN Joan Mcintosh PA-C        polyethylene glycol packet 17 g  17 g Oral Daily PRN Joan Mcintosh PA-C        promethazine tablet 25 mg  25 mg Oral Q6H PRN Joan Mcintosh PA-C        sodium chloride 0.9% flush 10 mL  10 mL Intravenous PRN Joan Mcintosh PA-C        tuberculin injection 5 Units  5 Units Intradermal Once Sanam Dumont PA-C         Current Discharge Medication List        START taking these medications    Details   blood-gluc,BP meter,adult cuff  Shira 1 each by Misc.(Non-Drug; Combo Route) route every morning.  Qty: 1 each, Refills: 0      cyanocobalamin (VITAMIN B-12) 250 MCG tablet Take 1 tablet (250 mcg total) by mouth once daily.  Qty: 30 tablet, Refills: 5      ferrous sulfate 325 (65 FE) MG EC tablet Take 1 tablet (325 mg total) by mouth once daily.  Qty: 30 tablet, Refills: 11      losartan-hydrochlorothiazide 100-12.5 mg (HYZAAR) 100-12.5 mg Tab Take 1 tablet by mouth once daily.  Qty: 90 tablet, Refills: 3    Comments: .      polyethylene glycol (GLYCOLAX) 17 gram PwPk Take 17 g by mouth 2 (two) times daily as needed (constipation caused by iron supplement).  Refills: 0           CONTINUE these medications which have NOT CHANGED    Details   acetaminophen (TYLENOL) 500 MG tablet Take 1,000 mg by mouth 2 (two) times daily as needed for Pain.      ARIPiprazole (ABILIFY) 15 MG Tab Take 15 mg by mouth once daily.      cyclobenzaprine (FLEXERIL) 10 MG tablet Take 1 tablet (10 mg total) by mouth 3 (three) times daily as needed.  Qty: 90 tablet, Refills: 3      !! DULoxetine (CYMBALTA) 30 MG capsule Take 30 mg by mouth every evening. (60MG QAM & 30MG QPM)      !! DULoxetine (CYMBALTA) 60 MG capsule Take 1 capsule (60 mg total) by mouth once daily.  Qty: 90 capsule, Refills: 1      esomeprazole (NEXIUM) 40 MG capsule Take 1 capsule (40 mg total) by mouth once daily.  Qty: 30 capsule, Refills: 5      FLUoxetine 20 MG capsule Take 20 mg by mouth once daily.      lamoTRIgine (LAMICTAL) 200 MG tablet Take 200 mg by mouth 2 (two) times daily.      lorazepam (ATIVAN) 0.5 MG tablet Take 1 tablet (0.5 mg total) by mouth daily as needed.  Qty: 30 tablet, Refills: 1      melatonin (MELATIN) 5 mg Take 10 mg by mouth nightly as needed (sleep).      metoprolol succinate (TOPROL-XL) 100 MG 24 hr tablet Take 1 tablet by mouth daily.  Qty: 90 tablet, Refills: 0      naproxen (NAPROSYN) 500 MG tablet Take 500 mg by mouth 2 (two) times daily with meals.      OLANZapine  (ZYPREXA) 5 MG tablet Take 5 mg by mouth once daily.      sumatriptan (IMITREX) 100 MG tablet TAKE ONE TABLET BY MOUTH AT ONSET, THEN ONE TABLET 2 HOURS LATER FOR EACH EPISODE  Qty: 9 tablet, Refills: 0      atorvastatin (LIPITOR) 20 MG tablet Take 1 tablet by mouth once daily.  Qty: 90 tablet, Refills: 0      buPROPion (WELLBUTRIN XL) 150 MG TB24 tablet Take 150 mg by mouth once daily.      triamcinolone acetonide 0.1% (KENALOG) 0.1 % cream        !! - Potential duplicate medications found. Please discuss with provider.        STOP taking these medications       gabapentin (NEURONTIN) 100 MG capsule Comments:   Reason for Stopping:                 I have seen and examined this patient within the last 30 days. My clinical findings that support the need for the home health skilled services and home bound status are the following:no   Weakness/numbness causing balance and gait disturbance due to Fracture making it taxing to leave home.  Requiring assistive device to leave home due to unsteady gait caused by  Fracture.     Diet:   cardiac diet    Labs:  Report Lab results to PCP.    Referrals/ Consults  Physical Therapy to evaluate and treat. Evaluate for home safety and equipment needs; Establish/upgrade home exercise program. Perform / instruct on therapeutic exercises, gait training, transfer training, and Range of Motion.  Occupational Therapy to evaluate and treat. Evaluate home environment for safety and equipment needs. Perform/Instruct on transfers, ADL training, ROM, and therapeutic exercises.  Aide to provide assistance with personal care, ADLs, and vital signs.    Activities:   activity as tolerated    Nursing:   Agency to admit patient within 24 hours of hospital discharge unless specified on physician order or at patient request    SN to complete comprehensive assessment including routine vital signs. Instruct on disease process and s/s of complications to report to MD. Review/verify medication list sent  home with the patient at time of discharge  and instruct patient/caregiver as needed. Frequency may be adjusted depending on start of care date.     Skilled nurse to perform up to 3 visits PRN for symptoms related to diagnosis    Notify MD if SBP > 160 or < 90; DBP > 90 or < 50; HR > 120 or < 50; Temp > 101; O2 < 88%;    Ok to schedule additional visits based on staff availability and patient request on consecutive days within the home health episode.    When multiple disciplines ordered:    Start of Care occurs on Sunday - Wednesday schedule remaining discipline evaluations as ordered on separate consecutive days following the start of care.    Thursday SOC -schedule subsequent evaluations Friday and Monday the following week.     Friday - Saturday SOC - schedule subsequent discipline evaluations on consecutive days starting Monday of the following week.    For all post-discharge communication and subsequent orders please contact patient's primary care physician. If unable to reach primary care physician or do not receive response within 30 minutes, please contact PCP for clinical staff order clarification      Home Health Aide:  Physical Therapy Three times weekly, Occupational Therapy Three times weekly, and Home Health Aide Three times weekly    Wound Care Orders  no    I certify that this patient is confined to her home and needs intermittent skilled nursing care, physical therapy, and occupational therapy.

## 2023-08-04 NOTE — PLAN OF CARE
Problem: Physical Therapy  Goal: Physical Therapy Goal  Description: PT goals until 8/13/23    1. Pt sit to stand with RW with WBAT in R darco shoe with mod independent-not met  2. Pt to perform gait 100ft with WBAT in R darco shoe with RW with supervision.-not met  3. Pt to up/down 5 steps with R UE rail with WBAT in R darco shoe with CGA.-not met  4. Pt to perform B LE exs in sitting or supine x 10 reps to strengthen B LE to improve functional mobility.-not met   Outcome: Ongoing, Progressing   Pt's goals remain appropriate and pt will continue to benefit from skilled PT services to work towards improved functional mobility including:  transfers, up/down steps, and gait.   8/4/2023

## 2023-08-04 NOTE — PT/OT/SLP PROGRESS
"Physical Therapy Treatment    Patient Name:  Elly Harris   MRN:  [id number]    Recommendations:     Discharge Recommendations: nursing facility, skilled  Discharge Equipment Recommendations: bedside commode, grab bar, walker, rolling  Barriers to discharge: Inaccessible home and Decreased caregiver support    Assessment:     Elly Harris is a 68 y.o. female admitted with a medical diagnosis of Symptomatic anemia.  She presents with the following impairments/functional limitations: weakness, impaired functional mobility, gait instability, impaired balance, decreased lower extremity function, pain, decreased safety awareness, orthopedic precautions . Pt is unsafe with functional mobility at this time due to pt requires  moderate assist for up/down steps, minimal assist for transfers, and minimal assist for gait due to instability and difficulty sequencing gait with darco wedge shoe. Pt is at risk for falls. Pt is motivated to progress with functional mobility.     Rehab Prognosis: Good; patient would benefit from acute skilled PT services to address these deficits and reach maximum level of function.    Recent Surgery: * No surgery found *      Plan:     During this hospitalization, patient to be seen 4 x/week to address the identified rehab impairments via gait training, therapeutic activities, therapeutic exercises, neuromuscular re-education and progress toward the following goals:    Plan of Care Expires:  09/02/23    Subjective   "I had another kind of rocker bottom shoe and I liked that one better."    Pain/Comfort:  Pain Rating 1:  (pt c/o pain in her R foot during gait, did not grade pain)  Location - Side 1: Right  Location - Orientation 1: generalized  Location 1: foot  Pain Addressed 1: Reposition, Cessation of Activity  Pain Rating Post-Intervention 1: 0/10 (no pain at rest)      Objective:     Communicated with nurse prior to session.  Patient found up in chair with PureWick upon PT entry to room. "     General Precautions: Standard, fall  Orthopedic Precautions: RLE weight bearing as tolerated (in darco wedge shoe)  Braces:  (darco wedge shoe)  Respiratory Status: Room air     Functional Mobility:  Transfers:     Sit to Stand:  minimum assistance with rolling walker  Gait: 50ft then 5ft with RW with WBAT R LE with darco wedge shoe on the R foot with minimal assist  Stairs:  Pt ascended/descended 5 stair(s) with No Assistive Device with B UE support on the right handrail with Moderate Assistance. Pt required verbal cues for proper technique  Pt performed B LE exs in sitting x 10 reps: hip flex, knee ext, and AP on L only    AM-PAC 6 CLICK MOBILITY  Turning over in bed (including adjusting bedclothes, sheets and blankets)?: 4  Sitting down on and standing up from a chair with arms (e.g., wheelchair, bedside commode, etc.): 3  Moving from lying on back to sitting on the side of the bed?: 3  Moving to and from a bed to a chair (including a wheelchair)?: 3  Need to walk in hospital room?: 3  Climbing 3-5 steps with a railing?: 2  Basic Mobility Total Score: 18     Patient left up in chair with all lines intact, call button in reach, and nurse notified..    GOALS:   Multidisciplinary Problems       Physical Therapy Goals          Problem: Physical Therapy    Goal Priority Disciplines Outcome Goal Variances Interventions   Physical Therapy Goal     PT, PT/OT Ongoing, Progressing     Description: PT goals until 8/13/23    1. Pt sit to stand with RW with WBAT in R darco shoe with mod independent-not met  2. Pt to perform gait 100ft with WBAT in R darco shoe with RW with supervision.-not met  3. Pt to up/down 5 steps with R UE rail with WBAT in R darco shoe with CGA.-not met  4. Pt to perform B LE exs in sitting or supine x 10 reps to strengthen B LE to improve functional mobility.-not met                        Time Tracking:     PT Received On: 08/04/23  PT Start Time: 1053     PT Stop Time: 1124  PT Total Time (min):  31 min     Billable Minutes: Gait Training 20 and Therapeutic Exercise 11    Treatment Type: Treatment  PT/PTA: PT           08/04/2023

## 2023-08-04 NOTE — PROGRESS NOTES
[facility] Observation [address] [place] [address] Medicine  Progress Note    Patient Name: Elly Harris  MRN: [id number]  Patient Class: OP- Observation   Admission Date: 8/1/2023  Length of Stay: 0 days  Attending Physician: Kendall Madrid MD  Primary Care Provider: Srikanth Preston MD        Subjective:     Principal Problem:Symptomatic anemia        HPI:  Patient is a 68 y.o. female with medical history of depression, bipolar disorder, fibromyalgia, GERD, hypertension presenting with visual and auditory hallucinations for the past 3 days.  Patient states that 2 weeks ago she was started on Zyprexa for her depression and bipolar disorder.  Patient is unsure whether this is contributing to her recent hallucinations.  Patient states she has been hallucinating spiders, mice and men with knives. Patient reports she was diagnosed with some short of anemia two years ago but was never treated.  Her last blood work was at that time.  Her last colonoscopy was fiver years ago. She complaining of shortness of breath and wheezing with any exertion which have been going on for the past week.  States all her pain are chronic.  No blood in urine.  No increase back pain from back line.        In the ED she was found to have severe microcytic anemia with hh 4.1/17.3 plt of 94.  Denies bleeding.  Denies melena.  Reports severe dyspnea with exertion.        Overview/Hospital Course:  Elly Harris was admitted to hospital medicine for symptomatic anemia. Hgb 4.1 on admission, improved to 6.1 following one unit and again to 8.8 following a second unit. Hgb stable at 7.7 on repeat. Will follow up chronic iron deficiency anemia with her outpatient hematologist through [facility]. Patient hypertensive on admission, question of medication compliance. Restarted home meds with minimal improvement. Increased losartan-HCTZ to 100mg-12.5mg. Plan to dc with new order for BP cuff and instruction to complete BP log prior to PCP follow up in 1 week.  "Additionally, reports having visual hallucinations since starting zyprexa per opt psychiatry. Psych consulted and do not believe that her hallucinations are medication related, and recommend follow up with her outpatient psychiatrist through [facility]. Also while patient was in the ED, she got up to use to the restroom and tripped, hearing a "pop" in her foot. Xray showed an acute 4th metatarsal fracture. Ortho consulted, recommend weightbearing as tolerated with hard sole boot - provided. Therapy to assess functional status. Finally, patient reporting chronic worsening SOB. Echo with an EF of 55%, CVP of 8. CXR with nonspecific findings, CT chest consistent with pulmonary edema including interlobular septal thickening, fissural thickening and bilateral pleural effusions. Gave 40mg of Lasix with some improvement. Continue diuretic therapy with resumption of HCTZ at dc. Started on douneb and IV steroids. States she is establishing care with a pulmonologist at [facility] this month. Plan for a 6 minute walk test. Therapy eval completed - recommending SNF given acute injury and instability with ambulation. Patient will be discharged to SNF when medically ready with ortho and PCP follow up [facility]. Remainder of care through [facility] as previously stated.       Interval History: NAEON. AFVSS, BP improved today after yesterday's med adjustments. Patient medically ready for dc to SNF. Accepting facility without bed availability until Monday. Patient agreeable.     Review of Systems   Constitutional:  Positive for fatigue. Negative for activity change, chills and fever.   HENT: Negative.  Negative for trouble swallowing.    Eyes: Negative.  Negative for photophobia and visual disturbance.   Respiratory:  Positive for shortness of breath and wheezing. Negative for cough and chest tightness.    Cardiovascular: Negative.  Negative for chest pain, palpitations and leg swelling.   Gastrointestinal: Negative.  Negative for abdominal pain, " constipation, diarrhea, nausea and vomiting.   Genitourinary:  Negative for dysuria, frequency and hematuria.   Musculoskeletal:  Positive for arthralgias and joint swelling. Negative for back pain, gait problem and neck pain.   Skin:  Negative for rash and wound.   Neurological:  Negative for dizziness, syncope, speech difficulty and light-headedness.   Psychiatric/Behavioral:  Positive for confusion and hallucinations (visual). Negative for agitation. The patient is not nervous/anxious.      Objective:     Vital Signs (Most Recent):  Temp: 98.7 °F (37.1 °C) (08/04/23 1131)  Pulse: 70 (08/04/23 1131)  Resp: 20 (08/04/23 1131)  BP: (!) 168/73 (08/04/23 1131)  SpO2: 97 % (08/04/23 1131) Vital Signs (24h Range):  Temp:  [97.7 °F (36.5 °C)-98.8 °F (37.1 °C)] 98.7 °F (37.1 °C)  Pulse:  [63-74] 70  Resp:  [16-20] 20  SpO2:  [94 %-100 %] 97 %  BP: (133-181)/(62-83) 168/73     Weight: 78.5 kg (173 lb)  Body mass index is 27.1 kg/m².    Intake/Output Summary (Last 24 hours) at 8/4/2023 1328  Last data filed at 8/4/2023 1053  Gross per 24 hour   Intake --   Output 1800 ml   Net -1800 ml         Physical Exam  Constitutional:       Appearance: Normal appearance. She is obese.   HENT:      Head: Atraumatic.      Right Ear: External ear normal.      Left Ear: External ear normal.      Nose: Nose normal.      Mouth/Throat:      Pharynx: Oropharynx is clear. No oropharyngeal exudate or posterior oropharyngeal erythema.   Eyes:      Extraocular Movements: Extraocular movements intact.      Pupils: Pupils are equal, round, and reactive to light.      Comments: Pale conjunctivae   Cardiovascular:      Rate and Rhythm: Normal rate.      Pulses: Normal pulses.      Heart sounds: Murmur heard.   Pulmonary:      Effort: Pulmonary effort is normal.      Breath sounds: Normal breath sounds. No wheezing.   Abdominal:      Palpations: Abdomen is soft.      Tenderness: There is no abdominal tenderness. There is no guarding.      Hernia: No  hernia is present.   Musculoskeletal:         General: No signs of injury. Normal range of motion.      Cervical back: Normal range of motion. No rigidity.      Right lower leg: No edema.      Left lower leg: No edema.   Lymphadenopathy:      Cervical: No cervical adenopathy.   Skin:     General: Skin is warm and dry.      Capillary Refill: Capillary refill takes 2 to 3 seconds.      Coloration: Skin is not pale.   Neurological:      General: No focal deficit present.      Mental Status: She is alert and oriented to person, place, and time. Mental status is at baseline.   Psychiatric:         Mood and Affect: Mood normal.             Significant Labs: All pertinent labs within the past 24 hours have been reviewed.    Significant Imaging: I have reviewed all pertinent imaging results/findings within the past 24 hours.      Assessment/Plan:      SOB (shortness of breath)    Pt presenting with SOB, could be 2/2 anemia or underlying lung disease   - CXR: There is prominence of the central pulmonary vascular.  There is bilateral pattern of interstitial, ground-glass and patchy alveolar infiltrates, right greater than left.  - echo:    Results for orders placed during the hospital encounter of 08/01/23    Echo    Interpretation Summary    Left Ventricle: The left ventricle is normal in size. Ventricular mass is normal. Normal wall thickness. Normal wall motion. There is normal systolic function. Ejection fraction by visual approximation is 55%. There is normal diastolic function.    Left Atrium: Left atrium is mildly dilated. The left atrium volume index is 37.9 mL/m2.    Right Ventricle: Normal right ventricular cavity size. Wall thickness is normal. Right ventricle wall motion  is normal. Systolic function is normal.    Right Atrium: Normal right atrial size.    Aortic Valve: The aortic valve is structurally normal. There is normal leaflet mobility. There is no significant regurgitation.    Mitral Valve: The  mitral valve is structurally normal. There is normal leaflet mobility. There is mild regurgitation.    Tricuspid Valve: The tricuspid valve is structurally normal. There is normal leaflet mobility. There is mild transvalvular regurgitation.    Pulmonic Valve: The pulmonic valve is structurally normal. There is normal leaflet mobility. There is no significant regurgitation.    Aorta: Aortic root is normal in size measuring 2.70 cm. Ascending aorta is normal.    Pulmonary Artery: There is pulmonary hypertension. The estimated pulmonary artery systolic pressure is 46 mmHg.    IVC/SVC: Intermediate venous pressure at 8 mmHg.    Pericardium: The pericardium is normal. There is no pericardial effusion.  - CT chest:    -Findings consistent with pulmonary edema including interlobular septal thickening, fissural thickening and bilateral pleural effusions.  - given 40mg of lasix   --> continue diuresis with HCTZ   - started on dounebs   - one dose of IV steroids given       Closed displaced fracture of fourth metatarsal bone of right foot  - right foot with acute 4th metatarsal fracture  - ortho consulted:    -Recommend patient remain weight-bearing as tolerated in a hard-sole postop shoe with rocker bottom along with a multimodal pain regimen.  Daily physical therapy; allow patient to ambulate with normal shoe on left foot to avoid length-discrepancy when up and walking. We will have her follow-up in outpatient trauma clinic (patient will be contacted with scheduling information).  - PT/OT rec SNF - dc to accepting facility on Monday, pending bed availability     Thrombocytopenia  Could be secondary to her medications      Essential hypertension  Uncontrolled   Continue BB.    - pt hypertensive on home meds but unclear if compliant with ACEi; restart losartan-HCTZ    - Increased losartan-HCTZ to 100mg-12.5mg  PCP follow up in 1 week   BP cuff ordered; education patient on keeping BP log to review at clinic f/u        Bipolar 2 disorder  Restart home medications, check lamotrigine level  Hold Zyprexa on admit --> zyprexa resumed per psych recs   - psych does not believe VH are due to zyprexa - recommend continuation of home meds and close psych clinic follow up       Psychosis  Could be side effect of Zyprexa or secondary to bipolar disorder now with psychosis futures  - consulted psych:    -As patient is not having symptoms consistent with her underlying psychiatric disorder, recommend workup/correction of her underlying medical issues per primary team (anemia, B12 deficiency) and continuing her outpatient psychiatric therapeutic regimen.    - follow up with outpatient psych      Microcytic anemia  Symptomatic   Repeat HH   - Hgb stable at 7.7 following 2 Units   Transfuse for hgb less than 7  Anemia work up    - consistent with known iron deficiency anemia, start iron supplements      --> previously followed with hematology at [facility] - patient will re-establish care    - also found to be B12 deficient, start supplements daily      GERD (gastroesophageal reflux disease)  Restart home PPI      Fibromyalgia  Restart home medications      Depression  Restart home medications        Hyperlipidemia  Restart home medications        VTE Risk Mitigation (From admission, onward)         Ordered     IP VTE LOW RISK PATIENT  Once         08/02/23 0751     Place sequential compression device  Until discontinued         08/02/23 0751                Discharge Planning   FAARZ: 8/7/2023     Code Status: Full Code   Is the patient medically ready for discharge?: Yes    Reason for patient still in hospital (select all that apply): PT / OT recommendations and Pending disposition  Discharge Plan A: Home                  Sanam Dumont PA-C  Department of Hospital Medicine   [facility] - Observation 11H

## 2023-08-04 NOTE — ASSESSMENT & PLAN NOTE
- right foot with acute 4th metatarsal fracture  - ortho consulted:    -Recommend patient remain weight-bearing as tolerated in a hard-sole postop shoe with rocker bottom along with a multimodal pain regimen.  Daily physical therapy; allow patient to ambulate with normal shoe on left foot to avoid length-discrepancy when up and walking. We will have her follow-up in outpatient trauma clinic (patient will be contacted with scheduling information).  - PT/OT rec SNF - dc to accepting facility on Monday, pending bed availability

## 2023-08-04 NOTE — PLAN OF CARE
08/04/23 0858   Post-Acute Status   Post-Acute Authorization Placement   Post-Acute Placement Status Pending post-acute provider review/more information requested     142 received and uploaded to [employer].    SW received notification from [facility] that pt has been approved for SNF. Authorization# [id number]    SW will follow up on referrals that were previously sent to see who is able to accept pt.    UPDATE    SW met with pt at bedside to discuss accepting facilities, [facility] and [facility]. Pt reported that she does not want to go to neither. Pt inquired about [facility] and [facility]. SW informed pt that [facility] does not have any beds available and [facility] does not accept pt's from [facility]. Pt reported that she would like to discharge home with outpatient PT/OT. Pt also reported that she is current with an outpatient PT center. SW spoke with pt regarding the concerns PT have about her having difficulty getting up and down her steps and pt reported that her daughter will assist her. Pt reported that she would only need a RW for home. SW informed pt that she will let her provider know.    SW updated the medical team of the conversation with pt.    11:15AM    SW was informed by PT that pt would like to speak to SW regarding the accepting SNF's.     SW met with pt at bedside and she reported that she is now willing to go to [facility]. SW informed her that she will reach out to the facility to let them know and get the admissions paperwork started.    SW will continue to follow up.    11:30AM    SW contacted [facility] to update the facility on the authorization letter.     SW sent documentation over to [facility] for review, and will send SNF orders once placed.    11:45AM    SW was informed by [facility] that they will send SW the admissions packet for pt to complete; however, they will not be able to accept pt until Monday, 8/7.    JOSE updated the medical  team.      Rosenda Montejo LMSW  [facility]  Ext. [phone]   Clear

## 2023-08-04 NOTE — SUBJECTIVE & OBJECTIVE
Interval History: NAEON. AFVSS, BP improved today after yesterday's med adjustments. Patient medically ready for dc to SNF. Accepting facility without bed availability until Monday. Patient agreeable.     Review of Systems   Constitutional:  Positive for fatigue. Negative for activity change, chills and fever.   HENT: Negative.  Negative for trouble swallowing.    Eyes: Negative.  Negative for photophobia and visual disturbance.   Respiratory:  Positive for shortness of breath and wheezing. Negative for cough and chest tightness.    Cardiovascular: Negative.  Negative for chest pain, palpitations and leg swelling.   Gastrointestinal: Negative.  Negative for abdominal pain, constipation, diarrhea, nausea and vomiting.   Genitourinary:  Negative for dysuria, frequency and hematuria.   Musculoskeletal:  Positive for arthralgias and joint swelling. Negative for back pain, gait problem and neck pain.   Skin:  Negative for rash and wound.   Neurological:  Negative for dizziness, syncope, speech difficulty and light-headedness.   Psychiatric/Behavioral:  Positive for confusion and hallucinations (visual). Negative for agitation. The patient is not nervous/anxious.      Objective:     Vital Signs (Most Recent):  Temp: 98.7 °F (37.1 °C) (08/04/23 1131)  Pulse: 70 (08/04/23 1131)  Resp: 20 (08/04/23 1131)  BP: (!) 168/73 (08/04/23 1131)  SpO2: 97 % (08/04/23 1131) Vital Signs (24h Range):  Temp:  [97.7 °F (36.5 °C)-98.8 °F (37.1 °C)] 98.7 °F (37.1 °C)  Pulse:  [63-74] 70  Resp:  [16-20] 20  SpO2:  [94 %-100 %] 97 %  BP: (133-181)/(62-83) 168/73     Weight: 78.5 kg (173 lb)  Body mass index is 27.1 kg/m².    Intake/Output Summary (Last 24 hours) at 8/4/2023 1328  Last data filed at 8/4/2023 1053  Gross per 24 hour   Intake --   Output 1800 ml   Net -1800 ml         Physical Exam  Constitutional:       Appearance: Normal appearance. She is obese.   HENT:      Head: Atraumatic.      Right Ear: External ear normal.      Left Ear:  External ear normal.      Nose: Nose normal.      Mouth/Throat:      Pharynx: Oropharynx is clear. No oropharyngeal exudate or posterior oropharyngeal erythema.   Eyes:      Extraocular Movements: Extraocular movements intact.      Pupils: Pupils are equal, round, and reactive to light.      Comments: Pale conjunctivae   Cardiovascular:      Rate and Rhythm: Normal rate.      Pulses: Normal pulses.      Heart sounds: Murmur heard.   Pulmonary:      Effort: Pulmonary effort is normal.      Breath sounds: Normal breath sounds. No wheezing.   Abdominal:      Palpations: Abdomen is soft.      Tenderness: There is no abdominal tenderness. There is no guarding.      Hernia: No hernia is present.   Musculoskeletal:         General: No signs of injury. Normal range of motion.      Cervical back: Normal range of motion. No rigidity.      Right lower leg: No edema.      Left lower leg: No edema.   Lymphadenopathy:      Cervical: No cervical adenopathy.   Skin:     General: Skin is warm and dry.      Capillary Refill: Capillary refill takes 2 to 3 seconds.      Coloration: Skin is not pale.   Neurological:      General: No focal deficit present.      Mental Status: She is alert and oriented to person, place, and time. Mental status is at baseline.   Psychiatric:         Mood and Affect: Mood normal.             Significant Labs: All pertinent labs within the past 24 hours have been reviewed.    Significant Imaging: I have reviewed all pertinent imaging results/findings within the past 24 hours.

## 2023-08-04 NOTE — NURSING
Pt is AAOx4. No distress noted. Pt has boot on. States she had no hallucination overnight nor this AM. Call light in reach. Bed in low locked position. Side rails x2. Belongings at bedside. Pt free of fall and injuries Questions and concerns voiced and answered.    Plan of care continues.

## 2023-08-04 NOTE — PLAN OF CARE
NURSING HOME ORDERS    08/04/2023  [facility]  [facility] - OBSERVATION 11H  [address]  [place] [address]  Dept: [phone]  Loc: [phone]     Admit to Nursing Home:  Skilled nursing     Diagnoses:  Active Hospital Problems    Diagnosis  POA    *Symptomatic anemia [D64.9]  Yes    SOB (shortness of breath) [R06.02]  Yes    Psychosis [F29]  Yes    Bipolar 2 disorder [F31.81]  Yes    Essential hypertension [I10]  Yes    Thrombocytopenia [D69.6]  Yes    Closed displaced fracture of fourth metatarsal bone of right foot [S92.341A]  Yes    Microcytic anemia [D50.9]  Yes    GERD (gastroesophageal reflux disease) [K21.9]  Yes    Hyperlipidemia [E78.5]  Yes    Depression [F32.A]  Yes    Fibromyalgia [M79.7]  Yes     Chronic      Resolved Hospital Problems   No resolved problems to display.       Patient is homebound due to:  Symptomatic anemia    Allergies:  Review of patient's allergies indicates:   Allergen Reactions    Doxycycline Rash and Hives       Vitals:  Routine    Diet: cardiac diet    Activities:   Activity as tolerated    Goals of Care Treatment Preferences:  Code Status: Full Code      Labs:  per facility protocol    Nursing Precautions:  Fall    Consults:   PT to evaluate and treat- 4 times a week and OT to evaluate and treat- 4 times a week       Medications: Discontinue all previous medication orders, if any. See new list below.     Medication List        START taking these medications      blood-gluc,BP meter,adult cuff Shira  1 each by Misc.(Non-Drug; Combo Route) route every morning.     cyanocobalamin 250 MCG tablet  Commonly known as: VITAMIN B-12  Take 1 tablet (250 mcg total) by mouth once daily.     ferrous sulfate 325 (65 FE) MG EC tablet  Take 1 tablet (325 mg total) by mouth once daily.     losartan-hydrochlorothiazide 100-12.5 mg 100-12.5 mg Tab  Commonly known as: HYZAAR  Take 1 tablet by mouth once daily.     polyethylene glycol 17 gram Pwpk  Commonly known  as: GLYCOLAX  Take 17 g by mouth 2 (two) times daily as needed (constipation caused by iron supplement).            CONTINUE taking these medications      acetaminophen 500 MG tablet  Commonly known as: TYLENOL  Take 1,000 mg by mouth 2 (two) times daily as needed for Pain.     ARIPiprazole 15 MG Tab  Commonly known as: ABILIFY  Take 15 mg by mouth once daily.     atorvastatin 20 MG tablet  Commonly known as: LIPITOR  Take 1 tablet by mouth once daily.     buPROPion 150 MG TB24 tablet  Commonly known as: WELLBUTRIN XL  Take 150 mg by mouth once daily.     cyclobenzaprine 10 MG tablet  Commonly known as: FLEXERIL  Take 1 tablet (10 mg total) by mouth 3 (three) times daily as needed.     * DULoxetine 60 MG capsule  Commonly known as: CYMBALTA  Take 1 capsule (60 mg total) by mouth once daily.     * DULoxetine 30 MG capsule  Commonly known as: CYMBALTA  Take 30 mg by mouth every evening. (60MG QAM & 30MG QPM)     esomeprazole 40 MG capsule  Commonly known as: NEXIUM  Take 1 capsule (40 mg total) by mouth once daily.     FLUoxetine 20 MG capsule  Take 20 mg by mouth once daily.     lamoTRIgine 200 MG tablet  Commonly known as: LAMICTAL  Take 200 mg by mouth 2 (two) times daily.     LORazepam 0.5 MG tablet  Commonly known as: ATIVAN  Take 1 tablet (0.5 mg total) by mouth daily as needed.     melatonin 5 mg  Commonly known as: MELATIN  Take 10 mg by mouth nightly as needed (sleep).     metoprolol succinate 100 MG 24 hr tablet  Commonly known as: TOPROL-XL  Take 1 tablet by mouth daily.     naproxen 500 MG tablet  Commonly known as: NAPROSYN  Take 500 mg by mouth 2 (two) times daily with meals.     OLANZapine 5 MG tablet  Commonly known as: ZyPREXA  Take 5 mg by mouth once daily.     sumatriptan 100 MG tablet  Commonly known as: IMITREX  TAKE ONE TABLET BY MOUTH AT ONSET, THEN ONE TABLET 2 HOURS LATER FOR EACH EPISODE     triamcinolone acetonide 0.1% 0.1 % cream  Commonly known as: KENALOG           * This list has 2  medication(s) that are the same as other medications prescribed for you. Read the directions carefully, and ask your doctor or other care provider to review them with you.                    Immunizations Administered as of 8/4/2023       Name Date Dose VIS Date Route Exp Date    COVID-19, MRNA, LN-S, PF (Moderna) 3/10/2021 -- -- -- --    COVID-19, MRNA, LN-S, PF (Moderna) 2/10/2021 -- -- -- --            This patient has had both covid vaccinations    Some patients may experience side effects after vaccination.  These may include fever, headache, muscle or joint aches.  Most symptoms resolve with 24-48 hours and do not require urgent medical evaluation unless they persist for more than 72 hours or symptoms are concerning for an unrelated medical condition.          _________________________________  Sanam Dumont PA-C  08/04/2023

## 2023-08-05 LAB
ALBUMIN SERPL BCP-MCNC: 3.5 G/DL (ref 3.5–5.2)
ALP SERPL-CCNC: 134 U/L (ref 55–135)
ALT SERPL W/O P-5'-P-CCNC: 12 U/L (ref 10–44)
ANION GAP SERPL CALC-SCNC: 16 MMOL/L (ref 8–16)
ANISOCYTOSIS BLD QL SMEAR: SLIGHT
AST SERPL-CCNC: 17 U/L (ref 10–40)
BASOPHILS # BLD AUTO: 0.07 K/UL (ref 0–0.2)
BASOPHILS NFR BLD: 0.6 % (ref 0–1.9)
BILIRUB SERPL-MCNC: 0.5 MG/DL (ref 0.1–1)
BLD PROD TYP BPU: NORMAL
BLOOD UNIT EXPIRATION DATE: NORMAL
BLOOD UNIT TYPE CODE: 5100
BLOOD UNIT TYPE: NORMAL
BUN SERPL-MCNC: 27 MG/DL (ref 8–23)
CALCIUM SERPL-MCNC: 9.6 MG/DL (ref 8.7–10.5)
CHLORIDE SERPL-SCNC: 106 MMOL/L (ref 95–110)
CO2 SERPL-SCNC: 21 MMOL/L (ref 23–29)
CODING SYSTEM: NORMAL
CREAT SERPL-MCNC: 1.1 MG/DL (ref 0.5–1.4)
CROSSMATCH INTERPRETATION: NORMAL
DIFFERENTIAL METHOD: ABNORMAL
DISPENSE STATUS: NORMAL
EOSINOPHIL # BLD AUTO: 0.7 K/UL (ref 0–0.5)
EOSINOPHIL NFR BLD: 6 % (ref 0–8)
ERYTHROCYTE [DISTWIDTH] IN BLOOD BY AUTOMATED COUNT: 27.4 % (ref 11.5–14.5)
EST. GFR  (NO RACE VARIABLE): 54.4 ML/MIN/1.73 M^2
GLUCOSE SERPL-MCNC: 96 MG/DL (ref 70–110)
HCT VFR BLD AUTO: 34.6 % (ref 37–48.5)
HGB BLD-MCNC: 9.7 G/DL (ref 12–16)
HYPOCHROMIA BLD QL SMEAR: ABNORMAL
IMM GRANULOCYTES # BLD AUTO: 0.05 K/UL (ref 0–0.04)
IMM GRANULOCYTES NFR BLD AUTO: 0.5 % (ref 0–0.5)
LYMPHOCYTES # BLD AUTO: 1.4 K/UL (ref 1–4.8)
LYMPHOCYTES NFR BLD: 13.1 % (ref 18–48)
MAGNESIUM SERPL-MCNC: 2.3 MG/DL (ref 1.6–2.6)
MCH RBC QN AUTO: 20.4 PG (ref 27–31)
MCHC RBC AUTO-ENTMCNC: 28 G/DL (ref 32–36)
MCV RBC AUTO: 73 FL (ref 82–98)
MONOCYTES # BLD AUTO: 2.1 K/UL (ref 0.3–1)
MONOCYTES NFR BLD: 19 % (ref 4–15)
NEUTROPHILS # BLD AUTO: 6.6 K/UL (ref 1.8–7.7)
NEUTROPHILS NFR BLD: 60.8 % (ref 38–73)
NRBC BLD-RTO: 0 /100 WBC
PHOSPHATE SERPL-MCNC: 4.4 MG/DL (ref 2.7–4.5)
PLATELET # BLD AUTO: 230 K/UL (ref 150–450)
PLATELET BLD QL SMEAR: ABNORMAL
PMV BLD AUTO: ABNORMAL FL (ref 9.2–12.9)
POIKILOCYTOSIS BLD QL SMEAR: SLIGHT
POTASSIUM SERPL-SCNC: 4.4 MMOL/L (ref 3.5–5.1)
PROT SERPL-MCNC: 6.4 G/DL (ref 6–8.4)
RBC # BLD AUTO: 4.75 M/UL (ref 4–5.4)
SODIUM SERPL-SCNC: 143 MMOL/L (ref 136–145)
TRANS ERYTHROCYTES VOL PATIENT: NORMAL ML
WBC # BLD AUTO: 10.91 K/UL (ref 3.9–12.7)

## 2023-08-05 PROCEDURE — 85025 COMPLETE CBC W/AUTO DIFF WBC: CPT | Mod: HCNC | Performed by: INTERNAL MEDICINE

## 2023-08-05 PROCEDURE — 84100 ASSAY OF PHOSPHORUS: CPT | Mod: HCNC

## 2023-08-05 PROCEDURE — 94761 N-INVAS EAR/PLS OXIMETRY MLT: CPT | Mod: HCNC

## 2023-08-05 PROCEDURE — 27100171 HC OXYGEN HIGH FLOW UP TO 24 HOURS: Mod: HCNC

## 2023-08-05 PROCEDURE — 25000003 PHARM REV CODE 250: Mod: HCNC

## 2023-08-05 PROCEDURE — 99232 SBSQ HOSP IP/OBS MODERATE 35: CPT | Mod: HCNC,,,

## 2023-08-05 PROCEDURE — G0378 HOSPITAL OBSERVATION PER HR: HCPCS | Mod: HCNC

## 2023-08-05 PROCEDURE — 99232 PR SUBSEQUENT HOSPITAL CARE,LEVL II: ICD-10-PCS | Mod: HCNC,,,

## 2023-08-05 PROCEDURE — 83735 ASSAY OF MAGNESIUM: CPT | Mod: HCNC

## 2023-08-05 PROCEDURE — 80053 COMPREHEN METABOLIC PANEL: CPT | Mod: HCNC

## 2023-08-05 PROCEDURE — 25000003 PHARM REV CODE 250: Mod: HCNC | Performed by: STUDENT IN AN ORGANIZED HEALTH CARE EDUCATION/TRAINING PROGRAM

## 2023-08-05 PROCEDURE — 94799 UNLISTED PULMONARY SVC/PX: CPT | Mod: HCNC

## 2023-08-05 PROCEDURE — 99900035 HC TECH TIME PER 15 MIN (STAT): Mod: HCNC

## 2023-08-05 PROCEDURE — 36415 COLL VENOUS BLD VENIPUNCTURE: CPT | Mod: HCNC | Performed by: INTERNAL MEDICINE

## 2023-08-05 PROCEDURE — 36415 COLL VENOUS BLD VENIPUNCTURE: CPT | Mod: HCNC

## 2023-08-05 RX ORDER — AMLODIPINE BESYLATE 5 MG/1
5 TABLET ORAL DAILY
Status: DISCONTINUED | OUTPATIENT
Start: 2023-08-05 | End: 2023-08-06

## 2023-08-05 RX ORDER — GABAPENTIN 100 MG/1
100 CAPSULE ORAL 2 TIMES DAILY
Status: DISCONTINUED | OUTPATIENT
Start: 2023-08-05 | End: 2023-08-07 | Stop reason: HOSPADM

## 2023-08-05 RX ADMIN — GABAPENTIN 100 MG: 100 CAPSULE ORAL at 08:08

## 2023-08-05 RX ADMIN — GABAPENTIN 200 MG: 100 CAPSULE ORAL at 08:08

## 2023-08-05 RX ADMIN — LOSARTAN POTASSIUM AND HYDROCHLOROTHIAZIDE 1 TABLET: 100; 12.5 TABLET, FILM COATED ORAL at 08:08

## 2023-08-05 RX ADMIN — AMLODIPINE BESYLATE 5 MG: 5 TABLET ORAL at 08:08

## 2023-08-05 RX ADMIN — FERROUS SULFATE TAB EC 325 MG (65 MG FE EQUIVALENT) 1 EACH: 325 (65 FE) TABLET DELAYED RESPONSE at 08:08

## 2023-08-05 RX ADMIN — ARIPIPRAZOLE 15 MG: 15 TABLET ORAL at 08:08

## 2023-08-05 RX ADMIN — CYANOCOBALAMIN TAB 250 MCG 250 MCG: 250 TAB at 08:08

## 2023-08-05 RX ADMIN — METOPROLOL SUCCINATE 100 MG: 100 TABLET, EXTENDED RELEASE ORAL at 08:08

## 2023-08-05 RX ADMIN — DULOXETINE HYDROCHLORIDE 60 MG: 60 CAPSULE, DELAYED RELEASE ORAL at 08:08

## 2023-08-05 RX ADMIN — LAMOTRIGINE 200 MG: 100 TABLET ORAL at 08:08

## 2023-08-05 RX ADMIN — ATORVASTATIN CALCIUM 20 MG: 20 TABLET, FILM COATED ORAL at 08:08

## 2023-08-05 NOTE — PROGRESS NOTES
[facility] [address] [place] [address] Medicine  Progress Note    Patient Name: Elly Harris  MRN: [id number]  Patient Class: OP- Observation   Admission Date: 8/1/2023  Length of Stay: 0 days  Attending Physician: Kendall Madrid MD  Primary Care Provider: Srikanth Preston MD        Subjective:     Principal Problem:Symptomatic anemia        HPI:  Patient is a 68 y.o. female with medical history of depression, bipolar disorder, fibromyalgia, GERD, hypertension presenting with visual and auditory hallucinations for the past 3 days.  Patient states that 2 weeks ago she was started on Zyprexa for her depression and bipolar disorder.  Patient is unsure whether this is contributing to her recent hallucinations.  Patient states she has been hallucinating spiders, mice and men with knives. Patient reports she was diagnosed with some short of anemia two years ago but was never treated.  Her last blood work was at that time.  Her last colonoscopy was fiver years ago. She complaining of shortness of breath and wheezing with any exertion which have been going on for the past week.  States all her pain are chronic.  No blood in urine.  No increase back pain from back line.        In the ED she was found to have severe microcytic anemia with hh 4.1/17.3 plt of 94.  Denies bleeding.  Denies melena.  Reports severe dyspnea with exertion.        Overview/Hospital Course:  Elly Harris was admitted to hospital medicine for symptomatic anemia. Hgb 4.1 on admission, improved to 6.1 following one unit and again to 8.8 following a second unit. Hgb stable at 7.7 on repeat. Will follow up chronic iron deficiency anemia with her outpatient hematologist through [facility]. Patient hypertensive on admission, question of medication compliance. Restarted home meds with minimal improvement. Increased losartan-HCTZ to 100mg-12.5mg. Plan to dc with new order for BP cuff and instruction to complete BP log prior to PCP follow up in 1 week.  "Additionally, reports having visual hallucinations since starting zyprexa per opt psychiatry. Psych consulted and do not believe that her hallucinations are medication related, and recommend follow up with her outpatient psychiatrist through [facility]. Also while patient was in the ED, she got up to use to the restroom and tripped, hearing a "pop" in her foot. Xray showed an acute 4th metatarsal fracture. Ortho consulted, recommend weightbearing as tolerated with hard sole boot - provided. Therapy to assess functional status. Finally, patient reporting chronic worsening SOB. Echo with an EF of 55%, CVP of 8. CXR with nonspecific findings, CT chest consistent with pulmonary edema including interlobular septal thickening, fissural thickening and bilateral pleural effusions. Gave 40mg of Lasix with some improvement. Continue diuretic therapy with resumption of HCTZ at dc. Started on douneb and IV steroids. States she is establishing care with a pulmonologist at [facility] this month. Plan for a 6 minute walk test. Therapy eval completed - recommending SNF given acute injury and instability with ambulation. Patient will be discharged to SNF when medically ready with ortho and PCP follow up through [facility]. Remainder of care through [facility] as previously stated.       Interval History: NAEON. AF, HDS. BP not at goal. Continue hyzaar and will add amlodipine today. Monitor for improvement. Patient without compliant this morning but reports her gabapentin making her lethargic and requests dosage adjustment. Decrease from 200 BID to 100 BID. Anticipate dc to SNF Monday.     Review of Systems   Constitutional:  Positive for fatigue. Negative for activity change, chills and fever.   HENT: Negative.  Negative for trouble swallowing.    Eyes: Negative.  Negative for photophobia and visual disturbance.   Respiratory:  Positive for shortness of breath and wheezing. Negative for cough and chest tightness.    Cardiovascular: Negative.  Negative for " chest pain, palpitations and leg swelling.   Gastrointestinal: Negative.  Negative for abdominal pain, constipation, diarrhea, nausea and vomiting.   Genitourinary:  Negative for dysuria, frequency and hematuria.   Musculoskeletal:  Positive for arthralgias and joint swelling. Negative for back pain, gait problem and neck pain.   Skin:  Negative for rash and wound.   Neurological:  Negative for dizziness, syncope, speech difficulty and light-headedness.   Psychiatric/Behavioral:  Positive for confusion and hallucinations (visual). Negative for agitation. The patient is not nervous/anxious.      Objective:     Vital Signs (Most Recent):  Temp: 97.8 °F (36.6 °C) (08/05/23 1148)  Pulse: 71 (08/05/23 1148)  Resp: 18 (08/05/23 1148)  BP: (!) 143/67 (08/05/23 1148)  SpO2: 98 % (08/05/23 1148) Vital Signs (24h Range):  Temp:  [97.3 °F (36.3 °C)-98.7 °F (37.1 °C)] 97.8 °F (36.6 °C)  Pulse:  [61-72] 71  Resp:  [15-19] 18  SpO2:  [98 %-100 %] 98 %  BP: (139-188)/(67-84) 143/67     Weight: 78.5 kg (173 lb)  Body mass index is 27.1 kg/m².  No intake or output data in the 24 hours ending 08/05/23 1352      Physical Exam  Constitutional:       Appearance: Normal appearance. She is obese.   HENT:      Head: Atraumatic.      Right Ear: External ear normal.      Left Ear: External ear normal.      Nose: Nose normal.      Mouth/Throat:      Pharynx: Oropharynx is clear. No oropharyngeal exudate or posterior oropharyngeal erythema.   Eyes:      Extraocular Movements: Extraocular movements intact.      Pupils: Pupils are equal, round, and reactive to light.   Cardiovascular:      Rate and Rhythm: Normal rate.      Pulses: Normal pulses.      Heart sounds: Murmur heard.   Pulmonary:      Effort: Pulmonary effort is normal.      Breath sounds: Normal breath sounds. No wheezing.   Abdominal:      Palpations: Abdomen is soft.      Tenderness: There is no abdominal tenderness. There is no guarding.      Hernia: No hernia is present.    Musculoskeletal:         General: No signs of injury. Normal range of motion.      Cervical back: Normal range of motion. No rigidity.      Right lower leg: No edema.      Left lower leg: No edema.   Lymphadenopathy:      Cervical: No cervical adenopathy.   Skin:     General: Skin is warm and dry.      Capillary Refill: Capillary refill takes 2 to 3 seconds.      Coloration: Skin is not pale.   Neurological:      General: No focal deficit present.      Mental Status: She is alert and oriented to person, place, and time. Mental status is at baseline.   Psychiatric:         Mood and Affect: Mood normal.             Significant Labs: All pertinent labs within the past 24 hours have been reviewed.    Significant Imaging: I have reviewed all pertinent imaging results/findings within the past 24 hours.      Assessment/Plan:      SOB (shortness of breath)    Pt presenting with SOB, could be 2/2 anemia or underlying lung disease   - CXR: There is prominence of the central pulmonary vascular.  There is bilateral pattern of interstitial, ground-glass and patchy alveolar infiltrates, right greater than left.  - echo:    Results for orders placed during the hospital encounter of 08/01/23    Echo    Interpretation Summary    Left Ventricle: The left ventricle is normal in size. Ventricular mass is normal. Normal wall thickness. Normal wall motion. There is normal systolic function. Ejection fraction by visual approximation is 55%. There is normal diastolic function.    Left Atrium: Left atrium is mildly dilated. The left atrium volume index is 37.9 mL/m2.    Right Ventricle: Normal right ventricular cavity size. Wall thickness is normal. Right ventricle wall motion  is normal. Systolic function is normal.    Right Atrium: Normal right atrial size.    Aortic Valve: The aortic valve is structurally normal. There is normal leaflet mobility. There is no significant regurgitation.    Mitral Valve: The mitral valve is  structurally normal. There is normal leaflet mobility. There is mild regurgitation.    Tricuspid Valve: The tricuspid valve is structurally normal. There is normal leaflet mobility. There is mild transvalvular regurgitation.    Pulmonic Valve: The pulmonic valve is structurally normal. There is normal leaflet mobility. There is no significant regurgitation.    Aorta: Aortic root is normal in size measuring 2.70 cm. Ascending aorta is normal.    Pulmonary Artery: There is pulmonary hypertension. The estimated pulmonary artery systolic pressure is 46 mmHg.    IVC/SVC: Intermediate venous pressure at 8 mmHg.    Pericardium: The pericardium is normal. There is no pericardial effusion.  - CT chest:    -Findings consistent with pulmonary edema including interlobular septal thickening, fissural thickening and bilateral pleural effusions.  - given 40mg of lasix   --> continue diuresis with HCTZ   - started on dounebs   - one dose of IV steroids given       Closed displaced fracture of fourth metatarsal bone of right foot  - right foot with acute 4th metatarsal fracture  - ortho consulted:    -Recommend patient remain weight-bearing as tolerated in a hard-sole postop shoe with rocker bottom along with a multimodal pain regimen.  Daily physical therapy; allow patient to ambulate with normal shoe on left foot to avoid length-discrepancy when up and walking. We will have her follow-up in outpatient trauma clinic (patient will be contacted with scheduling information).  - PT/OT rec SNF - dc to accepting facility on Monday, pending bed availability     Thrombocytopenia  Could be secondary to her medications      Essential hypertension  Uncontrolled   Continue BB.    - pt hypertensive on home meds but unclear if compliant with ACEi; restart losartan-HCTZ    - Increased losartan-HCTZ to 100mg-12.5mg  - add amlodipine 5 qd as BP not at goal   PCP follow up in 1 week   BP cuff ordered; education patient on keeping BP log to  review at clinic f/u       Bipolar 2 disorder  Restart home medications, check lamotrigine level  Hold Zyprexa on admit --> zyprexa resumed per psych recs   - psych does not believe VH are due to zyprexa - recommend continuation of home meds and close psych clinic follow up       Psychosis  Could be side effect of Zyprexa or secondary to bipolar disorder now with psychosis futures  - consulted psych:    -As patient is not having symptoms consistent with her underlying psychiatric disorder, recommend workup/correction of her underlying medical issues per primary team (anemia, B12 deficiency) and continuing her outpatient psychiatric therapeutic regimen.    - follow up with outpatient psych      Microcytic anemia  Symptomatic   Repeat HH   - Hgb stable at 7.7 following 2 Units   Transfuse for hgb less than 7  Anemia work up    - consistent with known iron deficiency anemia, start iron supplements      --> previously followed with hematology at [facility] - patient will re-establish care    - also found to be B12 deficient, start supplements daily      GERD (gastroesophageal reflux disease)  Restart home PPI      Fibromyalgia  Restart home medications      Depression  Restart home medications        Hyperlipidemia  Restart home medications        VTE Risk Mitigation (From admission, onward)         Ordered     IP VTE LOW RISK PATIENT  Once         08/02/23 0751     Place sequential compression device  Until discontinued         08/02/23 0751                Discharge Planning   FARAZ: 8/7/2023     Code Status: Full Code   Is the patient medically ready for discharge?: Yes    Reason for patient still in hospital (select all that apply): Patient trending condition, Treatment and Pending disposition  Discharge Plan A: Home                  Sanam Dumont PA-C  Department of Hospital Medicine   [facility] 11H

## 2023-08-05 NOTE — ASSESSMENT & PLAN NOTE
Uncontrolled   Continue BB.    - pt hypertensive on home meds but unclear if compliant with ACEi; restart losartan-HCTZ    - Increased losartan-HCTZ to 100mg-12.5mg  - add amlodipine 5 qd as BP not at goal   PCP follow up in 1 week   BP cuff ordered; education patient on keeping BP log to review at clinic f/u

## 2023-08-05 NOTE — SUBJECTIVE & OBJECTIVE
Interval History: NAEON. AF, HDS. BP not at goal. Continue hyzaar and will add amlodipine today. Monitor for improvement. Patient without compliant this morning but reports her gabapentin making her lethargic and requests dosage adjustment. Decrease from 200 BID to 100 BID. Anticipate dc to SNF Monday.     Review of Systems   Constitutional:  Positive for fatigue. Negative for activity change, chills and fever.   HENT: Negative.  Negative for trouble swallowing.    Eyes: Negative.  Negative for photophobia and visual disturbance.   Respiratory:  Positive for shortness of breath and wheezing. Negative for cough and chest tightness.    Cardiovascular: Negative.  Negative for chest pain, palpitations and leg swelling.   Gastrointestinal: Negative.  Negative for abdominal pain, constipation, diarrhea, nausea and vomiting.   Genitourinary:  Negative for dysuria, frequency and hematuria.   Musculoskeletal:  Positive for arthralgias and joint swelling. Negative for back pain, gait problem and neck pain.   Skin:  Negative for rash and wound.   Neurological:  Negative for dizziness, syncope, speech difficulty and light-headedness.   Psychiatric/Behavioral:  Positive for confusion and hallucinations (visual). Negative for agitation. The patient is not nervous/anxious.      Objective:     Vital Signs (Most Recent):  Temp: 97.8 °F (36.6 °C) (08/05/23 1148)  Pulse: 71 (08/05/23 1148)  Resp: 18 (08/05/23 1148)  BP: (!) 143/67 (08/05/23 1148)  SpO2: 98 % (08/05/23 1148) Vital Signs (24h Range):  Temp:  [97.3 °F (36.3 °C)-98.7 °F (37.1 °C)] 97.8 °F (36.6 °C)  Pulse:  [61-72] 71  Resp:  [15-19] 18  SpO2:  [98 %-100 %] 98 %  BP: (139-188)/(67-84) 143/67     Weight: 78.5 kg (173 lb)  Body mass index is 27.1 kg/m².  No intake or output data in the 24 hours ending 08/05/23 1352      Physical Exam  Constitutional:       Appearance: Normal appearance. She is obese.   HENT:      Head: Atraumatic.      Right Ear: External ear normal.       Left Ear: External ear normal.      Nose: Nose normal.      Mouth/Throat:      Pharynx: Oropharynx is clear. No oropharyngeal exudate or posterior oropharyngeal erythema.   Eyes:      Extraocular Movements: Extraocular movements intact.      Pupils: Pupils are equal, round, and reactive to light.   Cardiovascular:      Rate and Rhythm: Normal rate.      Pulses: Normal pulses.      Heart sounds: Murmur heard.   Pulmonary:      Effort: Pulmonary effort is normal.      Breath sounds: Normal breath sounds. No wheezing.   Abdominal:      Palpations: Abdomen is soft.      Tenderness: There is no abdominal tenderness. There is no guarding.      Hernia: No hernia is present.   Musculoskeletal:         General: No signs of injury. Normal range of motion.      Cervical back: Normal range of motion. No rigidity.      Right lower leg: No edema.      Left lower leg: No edema.   Lymphadenopathy:      Cervical: No cervical adenopathy.   Skin:     General: Skin is warm and dry.      Capillary Refill: Capillary refill takes 2 to 3 seconds.      Coloration: Skin is not pale.   Neurological:      General: No focal deficit present.      Mental Status: She is alert and oriented to person, place, and time. Mental status is at baseline.   Psychiatric:         Mood and Affect: Mood normal.             Significant Labs: All pertinent labs within the past 24 hours have been reviewed.    Significant Imaging: I have reviewed all pertinent imaging results/findings within the past 24 hours.

## 2023-08-06 PROBLEM — I27.20 PULMONARY HYPERTENSION: Status: ACTIVE | Noted: 2023-08-06

## 2023-08-06 LAB
BASOPHILS # BLD AUTO: 0.08 K/UL (ref 0–0.2)
BASOPHILS NFR BLD: 1 % (ref 0–1.9)
DIFFERENTIAL METHOD: ABNORMAL
EOSINOPHIL # BLD AUTO: 0.6 K/UL (ref 0–0.5)
EOSINOPHIL NFR BLD: 6.6 % (ref 0–8)
ERYTHROCYTE [DISTWIDTH] IN BLOOD BY AUTOMATED COUNT: 28.3 % (ref 11.5–14.5)
HCT VFR BLD AUTO: 30.5 % (ref 37–48.5)
HGB BLD-MCNC: 9.1 G/DL (ref 12–16)
IMM GRANULOCYTES # BLD AUTO: 0.09 K/UL (ref 0–0.04)
IMM GRANULOCYTES NFR BLD AUTO: 1.1 % (ref 0–0.5)
LYMPHOCYTES # BLD AUTO: 1.7 K/UL (ref 1–4.8)
LYMPHOCYTES NFR BLD: 19.9 % (ref 18–48)
MCH RBC QN AUTO: 21.2 PG (ref 27–31)
MCHC RBC AUTO-ENTMCNC: 29.8 G/DL (ref 32–36)
MCV RBC AUTO: 71 FL (ref 82–98)
MONOCYTES # BLD AUTO: 1.2 K/UL (ref 0.3–1)
MONOCYTES NFR BLD: 14.1 % (ref 4–15)
NEUTROPHILS # BLD AUTO: 4.8 K/UL (ref 1.8–7.7)
NEUTROPHILS NFR BLD: 57.3 % (ref 38–73)
NRBC BLD-RTO: 0 /100 WBC
PLATELET # BLD AUTO: 223 K/UL (ref 150–450)
PMV BLD AUTO: ABNORMAL FL (ref 9.2–12.9)
RBC # BLD AUTO: 4.3 M/UL (ref 4–5.4)
TB INDURATION 48 - 72 HR READ: 0 MM
WBC # BLD AUTO: 8.35 K/UL (ref 3.9–12.7)

## 2023-08-06 PROCEDURE — 25000003 PHARM REV CODE 250: Mod: HCNC

## 2023-08-06 PROCEDURE — 99233 PR SUBSEQUENT HOSPITAL CARE,LEVL III: ICD-10-PCS | Mod: HCNC,,,

## 2023-08-06 PROCEDURE — 99900035 HC TECH TIME PER 15 MIN (STAT): Mod: HCNC

## 2023-08-06 PROCEDURE — G0378 HOSPITAL OBSERVATION PER HR: HCPCS | Mod: HCNC

## 2023-08-06 PROCEDURE — 27100171 HC OXYGEN HIGH FLOW UP TO 24 HOURS: Mod: HCNC

## 2023-08-06 PROCEDURE — 94761 N-INVAS EAR/PLS OXIMETRY MLT: CPT | Mod: HCNC

## 2023-08-06 PROCEDURE — 36415 COLL VENOUS BLD VENIPUNCTURE: CPT | Mod: HCNC | Performed by: INTERNAL MEDICINE

## 2023-08-06 PROCEDURE — 94660 CPAP INITIATION&MGMT: CPT | Mod: HCNC

## 2023-08-06 PROCEDURE — 63600175 PHARM REV CODE 636 W HCPCS: Mod: HCNC

## 2023-08-06 PROCEDURE — 25000003 PHARM REV CODE 250: Mod: HCNC | Performed by: STUDENT IN AN ORGANIZED HEALTH CARE EDUCATION/TRAINING PROGRAM

## 2023-08-06 PROCEDURE — 99233 SBSQ HOSP IP/OBS HIGH 50: CPT | Mod: HCNC,,,

## 2023-08-06 PROCEDURE — 85025 COMPLETE CBC W/AUTO DIFF WBC: CPT | Mod: HCNC | Performed by: INTERNAL MEDICINE

## 2023-08-06 RX ORDER — FUROSEMIDE 10 MG/ML
20 INJECTION INTRAMUSCULAR; INTRAVENOUS ONCE
Status: COMPLETED | OUTPATIENT
Start: 2023-08-06 | End: 2023-08-06

## 2023-08-06 RX ORDER — LOSARTAN POTASSIUM AND HYDROCHLOROTHIAZIDE 25; 100 MG/1; MG/1
1 TABLET ORAL DAILY
Status: DISCONTINUED | OUTPATIENT
Start: 2023-08-06 | End: 2023-08-07 | Stop reason: HOSPADM

## 2023-08-06 RX ADMIN — FUROSEMIDE 20 MG: 10 INJECTION, SOLUTION INTRAMUSCULAR; INTRAVENOUS at 11:08

## 2023-08-06 RX ADMIN — GABAPENTIN 100 MG: 100 CAPSULE ORAL at 09:08

## 2023-08-06 RX ADMIN — AMLODIPINE BESYLATE 5 MG: 5 TABLET ORAL at 09:08

## 2023-08-06 RX ADMIN — METOPROLOL SUCCINATE 100 MG: 100 TABLET, EXTENDED RELEASE ORAL at 09:08

## 2023-08-06 RX ADMIN — LAMOTRIGINE 200 MG: 100 TABLET ORAL at 09:08

## 2023-08-06 RX ADMIN — LOSARTAN POTASSIUM AND HYDROCHLOROTHIAZIDE 1 TABLET: 100; 25 TABLET, FILM COATED ORAL at 10:08

## 2023-08-06 RX ADMIN — GABAPENTIN 100 MG: 100 CAPSULE ORAL at 08:08

## 2023-08-06 RX ADMIN — ATORVASTATIN CALCIUM 20 MG: 20 TABLET, FILM COATED ORAL at 09:08

## 2023-08-06 RX ADMIN — ARIPIPRAZOLE 15 MG: 15 TABLET ORAL at 09:08

## 2023-08-06 RX ADMIN — DULOXETINE HYDROCHLORIDE 60 MG: 60 CAPSULE, DELAYED RELEASE ORAL at 09:08

## 2023-08-06 RX ADMIN — ACETAMINOPHEN 650 MG: 325 TABLET ORAL at 02:08

## 2023-08-06 RX ADMIN — FERROUS SULFATE TAB EC 325 MG (65 MG FE EQUIVALENT) 1 EACH: 325 (65 FE) TABLET DELAYED RESPONSE at 09:08

## 2023-08-06 RX ADMIN — CYANOCOBALAMIN TAB 250 MCG 250 MCG: 250 TAB at 09:08

## 2023-08-06 RX ADMIN — LAMOTRIGINE 200 MG: 100 TABLET ORAL at 08:08

## 2023-08-06 NOTE — ASSESSMENT & PLAN NOTE
Uncontrolled   Continue BB.    - pt hypertensive on home meds but unclear if compliant with ACEi; restart losartan-HCTZ    - Increased losartan-HCTZ to 100mg-12.5mg --> dose adjustment to 100-25  - add amlodipine 5 qd as BP not at goal   PCP follow up in 1 week   BP cuff ordered; education patient on keeping BP log to review at clinic f/u

## 2023-08-06 NOTE — ASSESSMENT & PLAN NOTE
Pulmonary hypertension  Pt presenting with SOB, could be 2/2 anemia or underlying cardiopulmonary disease     - one dose of IV steroids given   - wheezing controlled. dounebs prn   - CXR: There is prominence of the central pulmonary vascular.  There is bilateral pattern of interstitial, ground-glass and patchy alveolar infiltrates, right greater than left.  - echo:    Results for orders placed during the hospital encounter of 08/01/23    Echo    Interpretation Summary    Left Ventricle: The left ventricle is normal in size. Ventricular mass is normal. Normal wall thickness. Normal wall motion. There is normal systolic function. Ejection fraction by visual approximation is 55%. There is normal diastolic function.    Left Atrium: Left atrium is mildly dilated. The left atrium volume index is 37.9 mL/m2.    Right Ventricle: Normal right ventricular cavity size. Wall thickness is normal. Right ventricle wall motion  is normal. Systolic function is normal.    Right Atrium: Normal right atrial size.    Aortic Valve: The aortic valve is structurally normal. There is normal leaflet mobility. There is no significant regurgitation.    Mitral Valve: The mitral valve is structurally normal. There is normal leaflet mobility. There is mild regurgitation.    Tricuspid Valve: The tricuspid valve is structurally normal. There is normal leaflet mobility. There is mild transvalvular regurgitation.    Pulmonic Valve: The pulmonic valve is structurally normal. There is normal leaflet mobility. There is no significant regurgitation.    Aorta: Aortic root is normal in size measuring 2.70 cm. Ascending aorta is normal.    Pulmonary Artery: There is pulmonary hypertension. The estimated pulmonary artery systolic pressure is 46 mmHg.    IVC/SVC: Intermediate venous pressure at 8 mmHg.    Pericardium: The pericardium is normal. There is no pericardial effusion.  - CT chest:    -Findings consistent with pulmonary edema including  interlobular septal thickening, fissural thickening and bilateral pleural effusions.  - given 40mg of lasix   --> continue diuresis with HCTZ    --> fluid volumes not adequately managed with HCTZ 12.5. Increase dose to 25 mg daily  --> 8/6 patient dyspneic and satting 90% on room air at rest --> placed on 2L NC with resolution of hypoxia. Lungs with inspiratory crackles bibasilar. IV lasix 20 ordered.    - consider repeat walk test

## 2023-08-06 NOTE — PROGRESS NOTES
[facility] - Observation [address] [place] [address] Medicine  Progress Note    Patient Name: Elly Harris  MRN: [id number]  Patient Class: OP- Observation   Admission Date: 8/1/2023  Length of Stay: 0 days  Attending Physician: Kendall Madrid MD  Primary Care Provider: Srikanth Preston MD        Subjective:     Principal Problem:Symptomatic anemia        HPI:  Patient is a 68 y.o. female with medical history of depression, bipolar disorder, fibromyalgia, GERD, hypertension presenting with visual and auditory hallucinations for the past 3 days.  Patient states that 2 weeks ago she was started on Zyprexa for her depression and bipolar disorder.  Patient is unsure whether this is contributing to her recent hallucinations.  Patient states she has been hallucinating spiders, mice and men with knives. Patient reports she was diagnosed with some short of anemia two years ago but was never treated.  Her last blood work was at that time.  Her last colonoscopy was fiver years ago. She complaining of shortness of breath and wheezing with any exertion which have been going on for the past week.  States all her pain are chronic.  No blood in urine.  No increase back pain from back line.        In the ED she was found to have severe microcytic anemia with hh 4.1/17.3 plt of 94.  Denies bleeding.  Denies melena.  Reports severe dyspnea with exertion.        Overview/Hospital Course:  Elly Harris was admitted to hospital medicine for symptomatic anemia. Hgb 4.1 on admission, improved to 6.1 following one unit and again to 8.8 following a second unit. Hgb stable at 7.7 on repeat. Will follow up chronic iron deficiency anemia with her outpatient hematologist through [facility]. Patient hypertensive on admission, question of medication compliance. Restarted home meds with minimal improvement. Increased losartan-HCTZ to 100mg-12.5mg. Plan to dc with new order for BP cuff and instruction to complete BP log prior to PCP follow up in 1 week.  "Additionally, reports having visual hallucinations since starting zyprexa per opt psychiatry. Psych consulted and do not believe that her hallucinations are medication related, and recommend follow up with her outpatient [place] [facility] Also while patient was in the ED, she got up to use to the restroom and tripped, hearing a "pop" in her foot. Xray showed an acute 4th metatarsal fracture. Ortho consulted, recommend weightbearing as tolerated with hard sole boot - provided. Therapy to assess functional status. Finally, patient reporting chronic worsening SOB. Echo with an EF of 55%, CVP of 8. CXR with nonspecific findings, CT chest consistent with pulmonary edema including interlobular septal thickening, fissural thickening and bilateral pleural effusions. Gave 40mg of Lasix with some improvement. Continue diuretic therapy with resumption of HCTZ at [place]. Started on douneb and IV steroids. States she is establishing care with a [place]mon[place] [facility] [place]. Plan for a 6 minute walk test. Therapy eval completed - recommending SNF given acute injury and instability with ambulation. Patient will be discharged to SNF when medically ready with ortho and PCP follow up through [facility]. Remainder of care through [facility] as previously stated.       Interval History: NAEON. Hypertensive this morning, but controlled overnight. Amlodipine added yesterday. Pt reporting dyspnea and satting 90% on room air at rest. Crackles in lung bases. Adjust diuretic HCTZ dosing for pulm edema likely 2/2 pulm HTN . Give 20 mg IV lasix x1. Consider repeat walk test this afternoon. Visual hallucinations persist but patient reports they are non-threatening, which is an improvement. Again discussed psychiatry recommendations for continued home meds and psych clinic follow up. Patient comfortable with this plan.     Review of Systems   Constitutional:  Positive for fatigue. Negative for activity change, chills and fever.   HENT: Negative.  " Negative for trouble swallowing.    Eyes: Negative.  Negative for photophobia and visual disturbance.   Respiratory:  Positive for shortness of breath and wheezing. Negative for cough and chest tightness.    Cardiovascular: Negative.  Negative for chest pain, palpitations and leg swelling.   Gastrointestinal: Negative.  Negative for abdominal pain, constipation, diarrhea, nausea and vomiting.   Genitourinary:  Negative for dysuria, frequency and hematuria.   Musculoskeletal:  Positive for arthralgias and joint swelling. Negative for back pain, gait problem and neck pain.   Skin:  Negative for rash and wound.   Neurological:  Negative for dizziness, syncope, speech difficulty and light-headedness.   Psychiatric/Behavioral:  Positive for confusion and hallucinations (visual). Negative for agitation. The patient is not nervous/anxious.      Objective:     Vital Signs (Most Recent):  Temp: 98 °F (36.7 °C) (08/06/23 0808)  Pulse: 65 (08/06/23 0808)  Resp: 19 (08/06/23 0808)  BP: (!) 183/79 (08/06/23 0808)  SpO2: 98 % (08/06/23 0808) Vital Signs (24h Range):  Temp:  [97.5 °F (36.4 °C)-98.3 °F (36.8 °C)] 98 °F (36.7 °C)  Pulse:  [60-82] 65  Resp:  [13-19] 19  SpO2:  [93 %-99 %] 98 %  BP: (133-183)/(63-79) 183/79     Weight: 78.5 kg (173 lb)  Body mass index is 27.1 kg/m².  No intake or output data in the 24 hours ending 08/06/23 1008      Physical Exam  Constitutional:       Appearance: Normal appearance. She is obese.   HENT:      Head: Atraumatic.      Right Ear: External ear normal.      Left Ear: External ear normal.      Nose: Nose normal.      Mouth/Throat:      Pharynx: Oropharynx is clear. No oropharyngeal exudate or posterior oropharyngeal erythema.   Eyes:      Extraocular Movements: Extraocular movements intact.      Pupils: Pupils are equal, round, and reactive to light.   Cardiovascular:      Rate and Rhythm: Normal rate.      Pulses: Normal pulses.      Heart sounds: Murmur heard.   Pulmonary:      Effort:  Pulmonary effort is normal.      Breath sounds: Rales present. No wheezing.      Comments: Breathing comfortably on 2L NC. Bibasilar crackles.   Abdominal:      Palpations: Abdomen is soft.      Tenderness: There is no abdominal tenderness. There is no guarding.      Hernia: No hernia is present.   Musculoskeletal:         General: No signs of injury. Normal range of motion.      Cervical back: Normal range of motion. No rigidity.      Right lower leg: No edema.      Left lower leg: No edema.   Lymphadenopathy:      Cervical: No cervical adenopathy.   Skin:     General: Skin is warm and dry.      Capillary Refill: Capillary refill takes 2 to 3 seconds.      Coloration: Skin is not pale.   Neurological:      General: No focal deficit present.      Mental Status: She is alert and oriented to person, place, and time. Mental status is at baseline.   Psychiatric:         Mood and Affect: Mood normal.             Significant Labs: All pertinent labs within the past 24 hours have been reviewed.    Significant Imaging: I have reviewed all pertinent imaging results/findings within the past 24 hours.      Assessment/Plan:      SOB (shortness of breath)  Pulmonary hypertension  Pt presenting with SOB, could be 2/2 anemia or underlying cardiopulmonary disease     - one dose of IV steroids given   - wheezing controlled. dounebs prn   - CXR: There is prominence of the central pulmonary vascular.  There is bilateral pattern of interstitial, ground-glass and patchy alveolar infiltrates, right greater than left.  - echo:    Results for orders placed during the hospital encounter of 08/01/23    Echo    Interpretation Summary    Left Ventricle: The left ventricle is normal in size. Ventricular mass is normal. Normal wall thickness. Normal wall motion. There is normal systolic function. Ejection fraction by visual approximation is 55%. There is normal diastolic function.    Left Atrium: Left atrium is mildly dilated. The left atrium  volume index is 37.9 mL/m2.    Right Ventricle: Normal right ventricular cavity size. Wall thickness is normal. Right ventricle wall motion  is normal. Systolic function is normal.    Right Atrium: Normal right atrial size.    Aortic Valve: The aortic valve is structurally normal. There is normal leaflet mobility. There is no significant regurgitation.    Mitral Valve: The mitral valve is structurally normal. There is normal leaflet mobility. There is mild regurgitation.    Tricuspid Valve: The tricuspid valve is structurally normal. There is normal leaflet mobility. There is mild transvalvular regurgitation.    Pulmonic Valve: The pulmonic valve is structurally normal. There is normal leaflet mobility. There is no significant regurgitation.    Aorta: Aortic root is normal in size measuring 2.70 cm. Ascending aorta is normal.    Pulmonary Artery: There is pulmonary hypertension. The estimated pulmonary artery systolic pressure is 46 mmHg.    IVC/SVC: Intermediate venous pressure at 8 mmHg.    Pericardium: The pericardium is normal. There is no pericardial effusion.  - CT chest:    -Findings consistent with pulmonary edema including interlobular septal thickening, fissural thickening and bilateral pleural effusions.  - given 40mg of lasix   --> continue diuresis with HCTZ    --> fluid volumes not adequately managed with HCTZ 12.5. Increase dose to 25 mg daily  --> 8/6 patient dyspneic and satting 90% on room air at rest --> placed on 2L NC with resolution of hypoxia. Lungs with inspiratory crackles bibasilar. IV lasix 20 ordered.    - consider repeat walk test         Closed displaced fracture of fourth metatarsal bone of right foot  - right foot with acute 4th metatarsal fracture  - ortho consulted:    -Recommend patient remain weight-bearing as tolerated in a hard-sole postop shoe with rocker bottom along with a multimodal pain regimen.  Daily physical therapy; allow patient to ambulate with normal shoe on left  foot to avoid length-discrepancy when up and walking. We will have her follow-up in outpatient trauma clinic (patient will be contacted with scheduling information).  - PT/OT rec SNF - dc to accepting facility on Monday, pending bed availability     Thrombocytopenia  Could be secondary to her medications      Essential hypertension  Uncontrolled   Continue BB.    - pt hypertensive on home meds but unclear if compliant with ACEi; restart losartan-HCTZ    - Increased losartan-HCTZ to 100mg-12.5mg --> dose adjustment to 100-25  - add amlodipine 5 qd as BP not at goal   PCP follow up in 1 week   BP cuff ordered; education patient on keeping BP log to review at clinic f/u       Bipolar 2 disorder  Restart home medications, check lamotrigine level  Hold Zyprexa on admit --> zyprexa resumed per psych recs   - psych does not believe VH are due to zyprexa - recommend continuation of home meds and close psych clinic follow up       Psychosis  Could be side effect of Zyprexa or secondary to bipolar disorder now with psychosis futures  - consulted psych:    -As patient is not having symptoms consistent with her underlying psychiatric disorder, recommend workup/correction of her underlying medical issues per primary team (anemia, B12 deficiency) and continuing her outpatient psychiatric therapeutic regimen.    - follow up with outpatient psych      Microcytic anemia  Symptomatic   Repeat HH   - Hgb stable at 7.7 following 2 Units   Transfuse for hgb less than 7  Anemia work up    - consistent with known iron deficiency anemia, start iron supplements      --> previously followed with hematology at [facility] - patient will re-establish care    - also found to be B12 deficient, start supplements daily      GERD (gastroesophageal reflux disease)  Restart home PPI      Fibromyalgia  Restart home medications      Depression  Restart home medications        Hyperlipidemia  Restart home medications        VTE Risk Mitigation (From admission,  onward)           Ordered     IP VTE LOW RISK PATIENT  Once         08/02/23 0751     Place sequential compression device  Until discontinued         08/02/23 0751                    Discharge Planning   FARAZ: 8/7/2023     Code Status: Full Code   Is the patient medically ready for discharge?: No    Reason for patient still in hospital (select all that apply): Patient trending condition, Laboratory test, Treatment and Pending disposition  Discharge Plan A: Home                  Sanam Dumont PA-C  Department of Hospital Medicine   [facility] - Observation 11H

## 2023-08-06 NOTE — SUBJECTIVE & OBJECTIVE
Interval History: NAEON. Hypertensive this morning, but controlled overnight. Amlodipine added yesterday. Pt reporting dyspnea and satting 90% on room air at rest. Crackles in lung bases. Adjust diuretic HCTZ dosing for pulm edema likely 2/2 pulm HTN . Give 20 mg IV lasix x1. Consider repeat walk test this afternoon. Visual hallucinations persist but patient reports they are non-threatening, which is an improvement. Again discussed psychiatry recommendations for continued home meds and psych clinic follow up. Patient comfortable with this plan.     Review of Systems   Constitutional:  Positive for fatigue. Negative for activity change, chills and fever.   HENT: Negative.  Negative for trouble swallowing.    Eyes: Negative.  Negative for photophobia and visual disturbance.   Respiratory:  Positive for shortness of breath and wheezing. Negative for cough and chest tightness.    Cardiovascular: Negative.  Negative for chest pain, palpitations and leg swelling.   Gastrointestinal: Negative.  Negative for abdominal pain, constipation, diarrhea, nausea and vomiting.   Genitourinary:  Negative for dysuria, frequency and hematuria.   Musculoskeletal:  Positive for arthralgias and joint swelling. Negative for back pain, gait problem and neck pain.   Skin:  Negative for rash and wound.   Neurological:  Negative for dizziness, syncope, speech difficulty and light-headedness.   Psychiatric/Behavioral:  Positive for confusion and hallucinations (visual). Negative for agitation. The patient is not nervous/anxious.      Objective:     Vital Signs (Most Recent):  Temp: 98 °F (36.7 °C) (08/06/23 0808)  Pulse: 65 (08/06/23 0808)  Resp: 19 (08/06/23 0808)  BP: (!) 183/79 (08/06/23 0808)  SpO2: 98 % (08/06/23 0808) Vital Signs (24h Range):  Temp:  [97.5 °F (36.4 °C)-98.3 °F (36.8 °C)] 98 °F (36.7 °C)  Pulse:  [60-82] 65  Resp:  [13-19] 19  SpO2:  [93 %-99 %] 98 %  BP: (133-183)/(63-79) 183/79     Weight: 78.5 kg (173 lb)  Body mass  index is 27.1 kg/m².  No intake or output data in the 24 hours ending 08/06/23 1008      Physical Exam  Constitutional:       Appearance: Normal appearance. She is obese.   HENT:      Head: Atraumatic.      Right Ear: External ear normal.      Left Ear: External ear normal.      Nose: Nose normal.      Mouth/Throat:      Pharynx: Oropharynx is clear. No oropharyngeal exudate or posterior oropharyngeal erythema.   Eyes:      Extraocular Movements: Extraocular movements intact.      Pupils: Pupils are equal, round, and reactive to light.   Cardiovascular:      Rate and Rhythm: Normal rate.      Pulses: Normal pulses.      Heart sounds: Murmur heard.   Pulmonary:      Effort: Pulmonary effort is normal.      Breath sounds: Rales present. No wheezing.      Comments: Breathing comfortably on 2L NC. Bibasilar crackles.   Abdominal:      Palpations: Abdomen is soft.      Tenderness: There is no abdominal tenderness. There is no guarding.      Hernia: No hernia is present.   Musculoskeletal:         General: No signs of injury. Normal range of motion.      Cervical back: Normal range of motion. No rigidity.      Right lower leg: No edema.      Left lower leg: No edema.   Lymphadenopathy:      Cervical: No cervical adenopathy.   Skin:     General: Skin is warm and dry.      Capillary Refill: Capillary refill takes 2 to 3 seconds.      Coloration: Skin is not pale.   Neurological:      General: No focal deficit present.      Mental Status: She is alert and oriented to person, place, and time. Mental status is at baseline.   Psychiatric:         Mood and Affect: Mood normal.             Significant Labs: All pertinent labs within the past 24 hours have been reviewed.    Significant Imaging: I have reviewed all pertinent imaging results/findings within the past 24 hours.

## 2023-08-06 NOTE — NURSING
Pt asleep,easily to be aroused Bipap. No complaints nor distress at this time.Call light in reach. Bed in low locked position. Side rails x2. Belongings at bedside. Pt free of fall and injuries Questions and concerns voiced and answered.  Plan of care continues.

## 2023-08-07 VITALS
RESPIRATION RATE: 18 BRPM | DIASTOLIC BLOOD PRESSURE: 71 MMHG | HEART RATE: 69 BPM | SYSTOLIC BLOOD PRESSURE: 169 MMHG | WEIGHT: 173 LBS | TEMPERATURE: 98 F | BODY MASS INDEX: 27.15 KG/M2 | HEIGHT: 67 IN | OXYGEN SATURATION: 96 %

## 2023-08-07 LAB
BASOPHILS # BLD AUTO: 0.07 K/UL (ref 0–0.2)
BASOPHILS NFR BLD: 0.9 % (ref 0–1.9)
DIFFERENTIAL METHOD: ABNORMAL
EOSINOPHIL # BLD AUTO: 0.5 K/UL (ref 0–0.5)
EOSINOPHIL NFR BLD: 5.8 % (ref 0–8)
ERYTHROCYTE [DISTWIDTH] IN BLOOD BY AUTOMATED COUNT: 28 % (ref 11.5–14.5)
HCT VFR BLD AUTO: 33.7 % (ref 37–48.5)
HGB BLD-MCNC: 9.5 G/DL (ref 12–16)
IMM GRANULOCYTES # BLD AUTO: 0.02 K/UL (ref 0–0.04)
IMM GRANULOCYTES NFR BLD AUTO: 0.3 % (ref 0–0.5)
LYMPHOCYTES # BLD AUTO: 1.6 K/UL (ref 1–4.8)
LYMPHOCYTES NFR BLD: 19.9 % (ref 18–48)
MCH RBC QN AUTO: 20.9 PG (ref 27–31)
MCHC RBC AUTO-ENTMCNC: 28.2 G/DL (ref 32–36)
MCV RBC AUTO: 74 FL (ref 82–98)
MONOCYTES # BLD AUTO: 1 K/UL (ref 0.3–1)
MONOCYTES NFR BLD: 12.1 % (ref 4–15)
NEUTROPHILS # BLD AUTO: 4.8 K/UL (ref 1.8–7.7)
NEUTROPHILS NFR BLD: 61 % (ref 38–73)
NRBC BLD-RTO: 0 /100 WBC
PLATELET # BLD AUTO: 242 K/UL (ref 150–450)
PMV BLD AUTO: 10.6 FL (ref 9.2–12.9)
RBC # BLD AUTO: 4.55 M/UL (ref 4–5.4)
WBC # BLD AUTO: 7.87 K/UL (ref 3.9–12.7)

## 2023-08-07 PROCEDURE — 99239 PR HOSPITAL DISCHARGE DAY,>30 MIN: ICD-10-PCS | Mod: HCNC,,,

## 2023-08-07 PROCEDURE — G0378 HOSPITAL OBSERVATION PER HR: HCPCS | Mod: HCNC

## 2023-08-07 PROCEDURE — 36415 COLL VENOUS BLD VENIPUNCTURE: CPT | Mod: HCNC | Performed by: INTERNAL MEDICINE

## 2023-08-07 PROCEDURE — 25000003 PHARM REV CODE 250: Mod: HCNC

## 2023-08-07 PROCEDURE — 94761 N-INVAS EAR/PLS OXIMETRY MLT: CPT | Mod: HCNC

## 2023-08-07 PROCEDURE — 25000003 PHARM REV CODE 250: Mod: HCNC | Performed by: STUDENT IN AN ORGANIZED HEALTH CARE EDUCATION/TRAINING PROGRAM

## 2023-08-07 PROCEDURE — 99239 HOSP IP/OBS DSCHRG MGMT >30: CPT | Mod: HCNC,,,

## 2023-08-07 PROCEDURE — 85025 COMPLETE CBC W/AUTO DIFF WBC: CPT | Mod: HCNC | Performed by: INTERNAL MEDICINE

## 2023-08-07 RX ORDER — GABAPENTIN 100 MG/1
100 CAPSULE ORAL 2 TIMES DAILY
Qty: 60 CAPSULE | Refills: 11 | Status: SHIPPED | OUTPATIENT
Start: 2023-08-07 | End: 2024-08-06

## 2023-08-07 RX ORDER — POLYETHYLENE GLYCOL 3350 17 G/17G
17 POWDER, FOR SOLUTION ORAL 2 TIMES DAILY PRN
Refills: 0 | COMMUNITY
Start: 2023-08-07

## 2023-08-07 RX ORDER — LOSARTAN POTASSIUM AND HYDROCHLOROTHIAZIDE 25; 100 MG/1; MG/1
1 TABLET ORAL DAILY
Qty: 90 TABLET | Refills: 3 | Status: SHIPPED | OUTPATIENT
Start: 2023-08-08 | End: 2024-08-07

## 2023-08-07 RX ADMIN — FERROUS SULFATE TAB EC 325 MG (65 MG FE EQUIVALENT) 1 EACH: 325 (65 FE) TABLET DELAYED RESPONSE at 08:08

## 2023-08-07 RX ADMIN — DULOXETINE HYDROCHLORIDE 60 MG: 60 CAPSULE, DELAYED RELEASE ORAL at 08:08

## 2023-08-07 RX ADMIN — LOSARTAN POTASSIUM AND HYDROCHLOROTHIAZIDE 1 TABLET: 100; 25 TABLET, FILM COATED ORAL at 08:08

## 2023-08-07 RX ADMIN — GABAPENTIN 100 MG: 100 CAPSULE ORAL at 08:08

## 2023-08-07 RX ADMIN — METOPROLOL SUCCINATE 100 MG: 100 TABLET, EXTENDED RELEASE ORAL at 08:08

## 2023-08-07 RX ADMIN — ATORVASTATIN CALCIUM 20 MG: 20 TABLET, FILM COATED ORAL at 08:08

## 2023-08-07 RX ADMIN — CYANOCOBALAMIN TAB 250 MCG 250 MCG: 250 TAB at 08:08

## 2023-08-07 RX ADMIN — ARIPIPRAZOLE 15 MG: 15 TABLET ORAL at 08:08

## 2023-08-07 RX ADMIN — LAMOTRIGINE 200 MG: 100 TABLET ORAL at 08:08

## 2023-08-07 NOTE — NURSING
PIV removed. Pt states that she understands her d/c paperwork and follow-up care. Setting up Lyft w/  for pt.    1836- Notified the pts daughter Tamica that she has left in a lyft and is on her way home.

## 2023-08-07 NOTE — PLAN OF CARE
Problem: Adult Inpatient Plan of Care  Goal: Plan of Care Review  8/7/2023 1020 by Jaiden Bailon RN  Outcome: Ongoing, Progressing  8/7/2023 1020 by Jaiden Bailon RN  Outcome: Ongoing, Progressing  Goal: Patient-Specific Goal (Individualized)  8/7/2023 1020 by Jaiden Bailon RN  Outcome: Ongoing, Progressing  8/7/2023 1020 by Jaiden Bailon RN  Outcome: Ongoing, Progressing  Goal: Absence of Hospital-Acquired Illness or Injury  8/7/2023 1020 by Jaiden Bailon RN  Outcome: Ongoing, Progressing  8/7/2023 1020 by Jaiden Bailon RN  Outcome: Ongoing, Progressing  Goal: Optimal Comfort and Wellbeing  8/7/2023 1020 by Jaiden Bailon RN  Outcome: Ongoing, Progressing  8/7/2023 1020 by Jaiden Bailon RN  Outcome: Ongoing, Progressing  Goal: Readiness for Transition of Care  8/7/2023 1020 by Jaiden Bailon RN  Outcome: Ongoing, Progressing  8/7/2023 1020 by Jaiden Bailon RN  Outcome: Ongoing, Progressing     Problem: Infection  Goal: Absence of Infection Signs and Symptoms  8/7/2023 1020 by Jaiden Bailon RN  Outcome: Ongoing, Progressing  8/7/2023 1020 by Jaiden Bailon RN  Outcome: Ongoing, Progressing

## 2023-08-07 NOTE — PLAN OF CARE
CM attended huddle with team at 10:00 AM. Discussed discharge plan options and needs. CM followed up on SNF referrals. Spoke with liaison from [facility]. Patient denied to their facilities due to bad debt claims (1 unknown and 1 auto insurance). Messaged remaining facilities for update, all stated denied due to bad claims. Updated team, will proceed with home health referrals. CM updated patient at bedside, refusing home health, says family will not allow. Requested outpatient therapy and CPAP for home use. Notified team, PA will enter orders for both. CM will arrange Lyft ride home when bedside nurse says patient is ready.    CM discussed discharge plans in depth with patient's daughter, Tamica, on the phone. Verbalized understanding. CM answered all questions. Daughter requested discharge medications be filled here and sent home with patient. Daughter is at home to receive patient via Lyft.     CM received call from bedside delivery, patient refusing to fill/pay for medications. CM completed cost transfer for $2.70 and hand delivered to outpatient pharmacy and picked up prescriptions (Losartan and Gabapentin) and delivered to bedside nurse.    CM received call from Jaiden Bailon, bedside nurse, ready for Lyft ride. Arranged ride, details below. Bedside nurse to notify patient's daughter when ride picks up patient.      [place], [phone]  [employer]  [email or web address]  License plate  [id number]  Request details  Angelia Gonzalez  Date and time  8/7/23, 06:25 PM CDT  Ride ID  [id number]    CM coordinated discharge plans with continuous contact with primary treatment team, patient, bedside nurse and family.     Angelia Gonzalez RN  Weekend  - [facility] [address]  Spectralink: [phone]

## 2023-08-07 NOTE — PT/OT/SLP PROGRESS
Physical Therapy      Patient Name:  Elly Harris   MRN:  [id number]    Patient not seen today. Pt planned to discharge from hospital today. Will follow-up if pt remains in the hospital.

## 2023-08-07 NOTE — PLAN OF CARE
[facility] - Observation 11H  Discharge Final Note    Primary Care Provider: Srikanth Preston MD    Expected Discharge Date: 8/7/2023    Patient is ready to discharge from case management standpoint.    Final Discharge Note (most recent)       Final Note - 08/07/23 1849          Final Note    Assessment Type Final Discharge Note     Anticipated Discharge Disposition Home or Self Care     What phone number can be called within the next 1-3 days to see how you are doing after discharge? [phone]     Hospital Resources/Appts/Education Provided Provided patient/caregiver with written discharge plan information;Appointments scheduled by Navigator/Coordinator        Post-Acute Status    Discharge Delays None known at this time                   Important Message from Medicare         Angelia Gonzalez RN  Weekend  - [facility]  Spectralink: [phone]

## 2023-08-17 NOTE — DISCHARGE SUMMARY
[facility] - Observation [address] [place] [address] Medicine  Discharge Summary      Patient Name: Elly Harris  MRN: [id number]  DAMION: [id number]  Patient Class: OP- Observation  Admission Date: 8/1/2023  Hospital Length of Stay: 0 days  Discharge Date and Time:  08/17/2023 12:28 PM  Attending Physician: Mariluz att. providers found   Discharging Provider: Sanam Dumont PA-C  Primary Care Provider: Srikanth Preston MD  [facility] Medicine Team: [facility] MED E Sanam Dumont PA-C  Primary Care Team: [facility] MED E    HPI:   Patient is a 68 y.o. female with medical history of depression, bipolar disorder, fibromyalgia, GERD, hypertension presenting with visual and auditory hallucinations for the past 3 days.  Patient states that 2 weeks ago she was started on Zyprexa for her depression and bipolar disorder.  Patient is unsure whether this is contributing to her recent hallucinations.  Patient states she has been hallucinating spiders, mice and men with knives. Patient reports she was diagnosed with some short of anemia two years ago but was never treated.  Her last blood work was at that time.  Her last colonoscopy was fiver years ago. She complaining of shortness of breath and wheezing with any exertion which have been going on for the past week.  States all her pain are chronic.  No blood in urine.  No increase back pain from back line.        In the ED she was found to have severe microcytic anemia with hh 4.1/17.3 plt of 94.  Denies bleeding.  Denies melena.  Reports severe dyspnea with exertion.        * No surgery found *      Hospital Course:   Elly Harris was admitted to hospital medicine for symptomatic anemia. Hgb 4.1 on admission, improved to 6.1 following one unit and again to 8.8 following a second unit. Hgb stable at 7.7 on repeat. Will follow up chronic iron deficiency anemia with her outpatient hematologist through [facility]. Patient hypertensive on admission, question of medication compliance. Restarted home meds  "with minimal improvement. Increased losartan-HCTZ to 100mg-12.5mg. Plan to dc with new order for BP cuff and instruction to complete BP log prior to PCP follow up in 1 week. Additionally, reports having visual hallucinations since starting zyprexa per opt psychiatry. Psych consulted and do not believe that her hallucinations are medication related, and recommend follow up with her outpatient psychiatrist through [facility]. Also while patient was in the ED, she got up to use to the restroom and tripped, hearing a "pop" in her foot. Xray showed an acute 4th metatarsal fracture. Ortho consulted, recommend weightbearing as tolerated with hard sole boot - provided. Therapy to assess functional status. Finally, patient reporting chronic worsening SOB. Echo with an EF of 55%, CVP of 8. CXR with nonspecific findings, CT chest consistent with pulmonary edema including interlobular septal thickening, fissural thickening and bilateral pleural effusions. Gave 40mg of Lasix with some improvement. Continue diuretic therapy with resumption of HCTZ at dc. Started on douneb and IV steroids. States she is establishing care with a pulmonologist at [facility] this month. Therapy eval completed - recommending SNF given acute injury and instability with ambulation. Patient discharged to SNF in stable condition, with ortho and PCP follow up through [facility]. Remainder of care through [facility] as previously stated.        Goals of Care Treatment Preferences:  Code Status: Full Code      Consults:   Consults (From admission, onward)        Status Ordering Provider     Inpatient consult to Orthopedics  Once        Provider:  (Not yet assigned)    Completed LINCOLN REYES     Inpatient consult to Psychiatry  Once        Provider:  (Not yet assigned)    Completed BORA ELLISON          Psychiatric  Bipolar 2 disorder  Restart home medications, check lamotrigine level  Hold Zyprexa on admit --> zyprexa resumed per psych recs   - psych does not believe VH are " due to zyprexa - recommend continuation of home meds and close psych clinic follow up       Psychosis  Could be side effect of Zyprexa or secondary to bipolar disorder now with psychosis futures  - consulted psych:    -As patient is not having symptoms consistent with her underlying psychiatric disorder, recommend workup/correction of her underlying medical issues per primary team (anemia, B12 deficiency) and continuing her outpatient psychiatric therapeutic regimen..    - follow up with outpatient psych      --> Hallucinations decreased in severity (now non-threatening) since patient received treatment for anemia. B12 deficiency is minimal, but pt started on oral supplementation.  No other organic source identified. Discussed with psychiatry who saw re-evaluated patient prior to discharge. Will follow up outpatient.     Pulmonary  SOB (shortness of breath)  Pulmonary hypertension  Pt presenting with SOB, could be 2/2 anemia or underlying cardiopulmonary disease     - one dose of IV steroids given   - wheezing controlled. dounebs prn   - CXR: There is prominence of the central pulmonary vascular.  There is bilateral pattern of interstitial, ground-glass and patchy alveolar infiltrates, right greater than left.  - echo:    Results for orders placed during the hospital encounter of 08/01/23    Echo    Interpretation Summary    Left Ventricle: The left ventricle is normal in size. Ventricular mass is normal. Normal wall thickness. Normal wall motion. There is normal systolic function. Ejection fraction by visual approximation is 55%. There is normal diastolic function.    Left Atrium: Left atrium is mildly dilated. The left atrium volume index is 37.9 mL/m2.    Right Ventricle: Normal right ventricular cavity size. Wall thickness is normal. Right ventricle wall motion  is normal. Systolic function is normal.    Right Atrium: Normal right atrial size.    Aortic Valve: The aortic valve is structurally normal. There  is normal leaflet mobility. There is no significant regurgitation.    Mitral Valve: The mitral valve is structurally normal. There is normal leaflet mobility. There is mild regurgitation.    Tricuspid Valve: The tricuspid valve is structurally normal. There is normal leaflet mobility. There is mild transvalvular regurgitation.    Pulmonic Valve: The pulmonic valve is structurally normal. There is normal leaflet mobility. There is no significant regurgitation.    Aorta: Aortic root is normal in size measuring 2.70 cm. Ascending aorta is normal.    Pulmonary Artery: There is pulmonary hypertension. The estimated pulmonary artery systolic pressure is 46 mmHg.    IVC/SVC: Intermediate venous pressure at 8 mmHg.    Pericardium: The pericardium is normal. There is no pericardial effusion.  - CT chest:    -Findings consistent with pulmonary edema including interlobular septal thickening, fissural thickening and bilateral pleural effusions.  - given 40mg of lasix   --> continue diuresis with HCTZ    --> fluid volumes not adequately managed with HCTZ 12.5. Increase dose to 25 mg daily  --> 8/6 patient dyspneic and satting 90% on room air at rest --> placed on 2L NC with resolution of hypoxia. Lungs with inspiratory crackles bibasilar. IV lasix 20 ordered. Weaned to room air, stable.         Cardiac/Vascular  Essential hypertension  Uncontrolled   Continue BB.    - pt hypertensive on home meds but unclear if compliant with ACEi; restart losartan-HCTZ    - Increased losartan-HCTZ to 100mg-12.5mg --> dose adjustment to 100-25  - add amlodipine 5 qd as BP not at goal   PCP follow up in 1 week   BP cuff ordered; education patient on keeping BP log to review at clinic f/u       Orthopedic  Closed displaced fracture of fourth metatarsal bone of right foot  - right foot with acute 4th metatarsal fracture  - ortho consulted:    -Recommend patient remain weight-bearing as tolerated in a hard-sole postop shoe with rocker bottom  "along with a multimodal pain regimen.  Daily physical therapy; allow patient to ambulate with normal shoe on left foot to avoid length-discrepancy when up and walking. We will have her follow-up in outpatient trauma clinic (patient will be contacted with scheduling information).  - PT/OT rec SNF       Final Active Diagnoses:    Diagnosis Date Noted POA    PRINCIPAL PROBLEM:  Symptomatic anemia [D64.9] 08/02/2023 Yes    Pulmonary hypertension [I27.20] 08/06/2023 Yes    SOB (shortness of breath) [R06.02] 08/03/2023 Yes    Psychosis [F29] 08/02/2023 Yes    Bipolar 2 disorder [F31.81] 08/02/2023 Yes    Essential hypertension [I10] 08/02/2023 Yes    Thrombocytopenia [D69.6] 08/02/2023 Yes    Closed displaced fracture of fourth metatarsal bone of right foot [S92.341A] 08/02/2023 Yes    Microcytic anemia [D50.9] 06/28/2018 Yes    GERD (gastroesophageal reflux disease) [K21.9] 03/01/2016 Yes    Hyperlipidemia [E78.5]  Yes    Depression [F32.A]  Yes    Fibromyalgia [M79.7]  Yes     Chronic      Problems Resolved During this Admission:       Discharged Condition: stable    Disposition: Home or Self Care    Follow Up:    Patient Instructions:      WALKER FOR HOME USE     Order Specific Question Answer Comments   Type of Walker: Adult (5'4"-6'6")    With wheels? Yes    Height: 5' 7" (1.702 m)    Weight: 78.5 kg (173 lb)    Length of need (1-99 months): 99    Does patient have medical equipment at home? rollator    Please check all that apply: Patient's condition impairs ambulation.    Please check all that apply: Patient is unable to safely ambulate without equipment.    Please check all that apply: Patient needs help to get in and out of chair.    Please check all that apply: Walker will be used for gait training.      Ambulatory referral/consult to Outpatient Case Management   Referral Priority: Routine Referral Type: Consultation   Referral Reason: Specialty Services Required   Number of Visits Requested: 1 "     Ambulatory referral/consult to Sleep Disorders   Standing Status: Future   Referral Priority: Routine Referral Type: Consultation   Requested Specialty: Sleep Medicine   Number of Visits Requested: 1     Ambulatory referral/consult to Internal Medicine   Standing Status: Future   Referral Priority: Urgent Referral Type: Consultation   Referral Reason: Specialty Services Required   Requested Specialty: Internal Medicine   Number of Visits Requested: 1     Ambulatory referral/consult to Physical/Occupational Therapy   Standing Status: Future   Referral Priority: Routine Referral Type: Physical Medicine   Referral Reason: Specialty Services Required   Number of Visits Requested: 1     Ambulatory referral/consult to Psychiatry   Standing Status: Future   Referral Priority: Urgent Referral Type: Psychiatric   Referral Reason: Specialty Services Required   Requested Specialty: Psychiatry   Number of Visits Requested: 1       Significant Diagnostic Studies: N/A    Pending Diagnostic Studies:     None         Medications:  Reconciled Home Medications:      Medication List      START taking these medications    blood-gluc,BP meter,adult cuff Shira  1 each by Misc.(Non-Drug; Combo Route) route every morning.     cyanocobalamin 250 MCG tablet  Commonly known as: VITAMIN B-12  Take 1 tablet (250 mcg total) by mouth once daily.     ferrous sulfate 325 (65 FE) MG EC tablet  Take 1 tablet (325 mg total) by mouth once daily.     losartan-hydrochlorothiazide 100-25 mg 100-25 mg per tablet  Commonly known as: HYZAAR  Take 1 tablet by mouth once daily.     polyethylene glycol 17 gram Pwpk  Commonly known as: GLYCOLAX  Take 17 g by mouth 2 (two) times daily as needed (constipation caused by iron supplement).        CHANGE how you take these medications    gabapentin 100 MG capsule  Commonly known as: NEURONTIN  Take 1 capsule (100 mg total) by mouth 2 (two) times daily.  What changed: how much to take        CONTINUE taking these  medications    acetaminophen 500 MG tablet  Commonly known as: TYLENOL  Take 1,000 mg by mouth 2 (two) times daily as needed for Pain.     ARIPiprazole 15 MG Tab  Commonly known as: ABILIFY  Take 15 mg by mouth once daily.     atorvastatin 20 MG tablet  Commonly known as: LIPITOR  Take 1 tablet by mouth once daily.     buPROPion 150 MG TB24 tablet  Commonly known as: WELLBUTRIN XL  Take 150 mg by mouth once daily.     cyclobenzaprine 10 MG tablet  Commonly known as: FLEXERIL  Take 1 tablet (10 mg total) by mouth 3 (three) times daily as needed.     * DULoxetine 60 MG capsule  Commonly known as: CYMBALTA  Take 1 capsule (60 mg total) by mouth once daily.     * DULoxetine 30 MG capsule  Commonly known as: CYMBALTA  Take 30 mg by mouth every evening. (60MG QAM & 30MG QPM)     esomeprazole 40 MG capsule  Commonly known as: NEXIUM  Take 1 capsule (40 mg total) by mouth once daily.     FLUoxetine 20 MG capsule  Take 20 mg by mouth once daily.     lamoTRIgine 200 MG tablet  Commonly known as: LAMICTAL  Take 200 mg by mouth 2 (two) times daily.     LORazepam 0.5 MG tablet  Commonly known as: ATIVAN  Take 1 tablet (0.5 mg total) by mouth daily as needed.     melatonin 5 mg  Commonly known as: MELATIN  Take 10 mg by mouth nightly as needed (sleep).     metoprolol succinate 100 MG 24 hr tablet  Commonly known as: TOPROL-XL  Take 1 tablet by mouth daily.     naproxen 500 MG tablet  Commonly known as: NAPROSYN  Take 500 mg by mouth 2 (two) times daily with meals.     OLANZapine 5 MG tablet  Commonly known as: ZyPREXA  Take 5 mg by mouth once daily.     sumatriptan 100 MG tablet  Commonly known as: IMITREX  TAKE ONE TABLET BY MOUTH AT ONSET, THEN ONE TABLET 2 HOURS LATER FOR EACH EPISODE     triamcinolone acetonide 0.1% 0.1 % cream  Commonly known as: KENALOG         * This list has 2 medication(s) that are the same as other medications prescribed for you. Read the directions carefully, and ask your doctor or other care provider  to review them with you.                Indwelling Lines/Drains at time of discharge:   Lines/Drains/Airways     None                 Time spent on the discharge of patient: 36 minutes         Sanam Dumont PA-C  Department of Hospital Medicine  [facility] - Observation 11H

## 2023-08-17 NOTE — ASSESSMENT & PLAN NOTE
Pulmonary hypertension  Pt presenting with SOB, could be 2/2 anemia or underlying cardiopulmonary disease     - one dose of IV steroids given   - wheezing controlled. dounebs prn   - CXR: There is prominence of the central pulmonary vascular.  There is bilateral pattern of interstitial, ground-glass and patchy alveolar infiltrates, right greater than left.  - echo:    Results for orders placed during the hospital encounter of 08/01/23    Echo    Interpretation Summary    Left Ventricle: The left ventricle is normal in size. Ventricular mass is normal. Normal wall thickness. Normal wall motion. There is normal systolic function. Ejection fraction by visual approximation is 55%. There is normal diastolic function.    Left Atrium: Left atrium is mildly dilated. The left atrium volume index is 37.9 mL/m2.    Right Ventricle: Normal right ventricular cavity size. Wall thickness is normal. Right ventricle wall motion  is normal. Systolic function is normal.    Right Atrium: Normal right atrial size.    Aortic Valve: The aortic valve is structurally normal. There is normal leaflet mobility. There is no significant regurgitation.    Mitral Valve: The mitral valve is structurally normal. There is normal leaflet mobility. There is mild regurgitation.    Tricuspid Valve: The tricuspid valve is structurally normal. There is normal leaflet mobility. There is mild transvalvular regurgitation.    Pulmonic Valve: The pulmonic valve is structurally normal. There is normal leaflet mobility. There is no significant regurgitation.    Aorta: Aortic root is normal in size measuring 2.70 cm. Ascending aorta is normal.    Pulmonary Artery: There is pulmonary hypertension. The estimated pulmonary artery systolic pressure is 46 mmHg.    IVC/SVC: Intermediate venous pressure at 8 mmHg.    Pericardium: The pericardium is normal. There is no pericardial effusion.  - CT chest:    -Findings consistent with pulmonary edema including  interlobular septal thickening, fissural thickening and bilateral pleural effusions.  - given 40mg of lasix   --> continue diuresis with HCTZ    --> fluid volumes not adequately managed with HCTZ 12.5. Increase dose to 25 mg daily  --> 8/6 patient dyspneic and satting 90% on room air at rest --> placed on 2L NC with resolution of hypoxia. Lungs with inspiratory crackles bibasilar. IV lasix 20 ordered. Weaned to room air, stable.

## 2023-08-17 NOTE — ASSESSMENT & PLAN NOTE
Could be side effect of Zyprexa or secondary to bipolar disorder now with psychosis futures  - consulted psych:    -As patient is not having symptoms consistent with her underlying psychiatric disorder, recommend workup/correction of her underlying medical issues per primary team (anemia, B12 deficiency) and continuing her outpatient psychiatric therapeutic regimen..    - follow up with outpatient psych      --> Hallucinations decreased in severity (now non-threatening) since patient received treatment for anemia. B12 deficiency is minimal, but pt started on oral supplementation.  No other organic source identified. Discussed with psychiatry who saw re-evaluated patient prior to discharge. Will follow up outpatient.

## 2023-08-18 ENCOUNTER — OFFICE VISIT (OUTPATIENT)
Dept: INTERNAL MEDICINE | Facility: CLINIC | Age: 68
End: 2023-08-18
Payer: MEDICARE

## 2023-08-18 ENCOUNTER — LAB VISIT (OUTPATIENT)
Dept: LAB | Facility: HOSPITAL | Age: 68
End: 2023-08-18
Attending: INTERNAL MEDICINE
Payer: MEDICARE

## 2023-08-18 VITALS
OXYGEN SATURATION: 95 % | WEIGHT: 175.25 LBS | DIASTOLIC BLOOD PRESSURE: 70 MMHG | SYSTOLIC BLOOD PRESSURE: 140 MMHG | HEIGHT: 67 IN | BODY MASS INDEX: 27.51 KG/M2 | HEART RATE: 72 BPM

## 2023-08-18 DIAGNOSIS — I27.20 PULMONARY HYPERTENSION: ICD-10-CM

## 2023-08-18 DIAGNOSIS — M53.3 TRAUMATIC COCCYDYNIA: ICD-10-CM

## 2023-08-18 DIAGNOSIS — E53.8 B12 DEFICIENCY: ICD-10-CM

## 2023-08-18 DIAGNOSIS — D50.9 IRON DEFICIENCY ANEMIA, UNSPECIFIED IRON DEFICIENCY ANEMIA TYPE: Primary | ICD-10-CM

## 2023-08-18 DIAGNOSIS — F31.9 BIPOLAR 1 DISORDER: ICD-10-CM

## 2023-08-18 DIAGNOSIS — R06.02 SOB (SHORTNESS OF BREATH): ICD-10-CM

## 2023-08-18 DIAGNOSIS — E78.5 HYPERLIPIDEMIA, UNSPECIFIED HYPERLIPIDEMIA TYPE: ICD-10-CM

## 2023-08-18 DIAGNOSIS — M81.0 OSTEOPOROSIS, UNSPECIFIED OSTEOPOROSIS TYPE, UNSPECIFIED PATHOLOGICAL FRACTURE PRESENCE: ICD-10-CM

## 2023-08-18 DIAGNOSIS — G47.33 OSA (OBSTRUCTIVE SLEEP APNEA): ICD-10-CM

## 2023-08-18 DIAGNOSIS — K21.9 GASTROESOPHAGEAL REFLUX DISEASE WITHOUT ESOPHAGITIS: ICD-10-CM

## 2023-08-18 DIAGNOSIS — R91.1 PULMONARY NODULE, RIGHT: ICD-10-CM

## 2023-08-18 DIAGNOSIS — I10 ESSENTIAL HYPERTENSION: ICD-10-CM

## 2023-08-18 DIAGNOSIS — M47.816 LUMBAR SPONDYLOSIS: ICD-10-CM

## 2023-08-18 DIAGNOSIS — S92.341A CLOSED DISPLACED FRACTURE OF FOURTH METATARSAL BONE OF RIGHT FOOT, INITIAL ENCOUNTER: ICD-10-CM

## 2023-08-18 DIAGNOSIS — D50.9 IRON DEFICIENCY ANEMIA, UNSPECIFIED IRON DEFICIENCY ANEMIA TYPE: ICD-10-CM

## 2023-08-18 DIAGNOSIS — N18.31 STAGE 3A CHRONIC KIDNEY DISEASE: ICD-10-CM

## 2023-08-18 DIAGNOSIS — R44.1 VISUAL HALLUCINATIONS: ICD-10-CM

## 2023-08-18 LAB
ANION GAP SERPL CALC-SCNC: 9 MMOL/L (ref 8–16)
ANISOCYTOSIS BLD QL SMEAR: ABNORMAL
BASOPHILS # BLD AUTO: 0.06 K/UL (ref 0–0.2)
BASOPHILS NFR BLD: 1.2 % (ref 0–1.9)
BUN SERPL-MCNC: 25 MG/DL (ref 8–23)
CALCIUM SERPL-MCNC: 9.6 MG/DL (ref 8.7–10.5)
CHLORIDE SERPL-SCNC: 104 MMOL/L (ref 95–110)
CO2 SERPL-SCNC: 25 MMOL/L (ref 23–29)
CREAT SERPL-MCNC: 1.3 MG/DL (ref 0.5–1.4)
DIFFERENTIAL METHOD: ABNORMAL
EOSINOPHIL # BLD AUTO: 0.2 K/UL (ref 0–0.5)
EOSINOPHIL NFR BLD: 4.6 % (ref 0–8)
ERYTHROCYTE [DISTWIDTH] IN BLOOD BY AUTOMATED COUNT: ABNORMAL % (ref 11.5–14.5)
EST. GFR  (NO RACE VARIABLE): 44.5 ML/MIN/1.73 M^2
GLUCOSE SERPL-MCNC: 82 MG/DL (ref 70–110)
HCT VFR BLD AUTO: 35 % (ref 37–48.5)
HGB BLD-MCNC: 9.7 G/DL (ref 12–16)
HYPOCHROMIA BLD QL SMEAR: ABNORMAL
IMM GRANULOCYTES # BLD AUTO: 0.01 K/UL (ref 0–0.04)
IMM GRANULOCYTES NFR BLD AUTO: 0.2 % (ref 0–0.5)
LYMPHOCYTES # BLD AUTO: 0.9 K/UL (ref 1–4.8)
LYMPHOCYTES NFR BLD: 17.9 % (ref 18–48)
MCH RBC QN AUTO: 21.6 PG (ref 27–31)
MCHC RBC AUTO-ENTMCNC: 27.7 G/DL (ref 32–36)
MCV RBC AUTO: 78 FL (ref 82–98)
MONOCYTES # BLD AUTO: 0.4 K/UL (ref 0.3–1)
MONOCYTES NFR BLD: 7.5 % (ref 4–15)
NEUTROPHILS # BLD AUTO: 3.6 K/UL (ref 1.8–7.7)
NEUTROPHILS NFR BLD: 68.6 % (ref 38–73)
NRBC BLD-RTO: 0 /100 WBC
PLATELET # BLD AUTO: 228 K/UL (ref 150–450)
PLATELET BLD QL SMEAR: ABNORMAL
PMV BLD AUTO: ABNORMAL FL (ref 9.2–12.9)
POIKILOCYTOSIS BLD QL SMEAR: SLIGHT
POLYCHROMASIA BLD QL SMEAR: ABNORMAL
POTASSIUM SERPL-SCNC: 4.1 MMOL/L (ref 3.5–5.1)
RBC # BLD AUTO: 4.49 M/UL (ref 4–5.4)
SODIUM SERPL-SCNC: 138 MMOL/L (ref 136–145)
TARGETS BLD QL SMEAR: ABNORMAL
WBC # BLD AUTO: 5.21 K/UL (ref 3.9–12.7)

## 2023-08-18 PROCEDURE — 3078F DIAST BP <80 MM HG: CPT | Mod: HCNC,CPTII,S$GLB, | Performed by: INTERNAL MEDICINE

## 2023-08-18 PROCEDURE — 99999 PR PBB SHADOW E&M-EST. PATIENT-LVL V: CPT | Mod: PBBFAC,HCNC,, | Performed by: INTERNAL MEDICINE

## 2023-08-18 PROCEDURE — 3078F PR MOST RECENT DIASTOLIC BLOOD PRESSURE < 80 MM HG: ICD-10-PCS | Mod: HCNC,CPTII,S$GLB, | Performed by: INTERNAL MEDICINE

## 2023-08-18 PROCEDURE — 3288F PR FALLS RISK ASSESSMENT DOCUMENTED: ICD-10-PCS | Mod: HCNC,CPTII,S$GLB, | Performed by: INTERNAL MEDICINE

## 2023-08-18 PROCEDURE — 80048 BASIC METABOLIC PNL TOTAL CA: CPT | Mod: HCNC | Performed by: INTERNAL MEDICINE

## 2023-08-18 PROCEDURE — 99999 PR PBB SHADOW E&M-EST. PATIENT-LVL V: ICD-10-PCS | Mod: PBBFAC,HCNC,, | Performed by: INTERNAL MEDICINE

## 2023-08-18 PROCEDURE — 1125F PR PAIN SEVERITY QUANTIFIED, PAIN PRESENT: ICD-10-PCS | Mod: HCNC,CPTII,S$GLB, | Performed by: INTERNAL MEDICINE

## 2023-08-18 PROCEDURE — 1100F PTFALLS ASSESS-DOCD GE2>/YR: CPT | Mod: HCNC,CPTII,S$GLB, | Performed by: INTERNAL MEDICINE

## 2023-08-18 PROCEDURE — 82607 VITAMIN B-12: CPT | Mod: HCNC | Performed by: INTERNAL MEDICINE

## 2023-08-18 PROCEDURE — 85025 COMPLETE CBC W/AUTO DIFF WBC: CPT | Mod: HCNC | Performed by: INTERNAL MEDICINE

## 2023-08-18 PROCEDURE — 3288F FALL RISK ASSESSMENT DOCD: CPT | Mod: HCNC,CPTII,S$GLB, | Performed by: INTERNAL MEDICINE

## 2023-08-18 PROCEDURE — 3008F PR BODY MASS INDEX (BMI) DOCUMENTED: ICD-10-PCS | Mod: HCNC,CPTII,S$GLB, | Performed by: INTERNAL MEDICINE

## 2023-08-18 PROCEDURE — 3077F SYST BP >= 140 MM HG: CPT | Mod: HCNC,CPTII,S$GLB, | Performed by: INTERNAL MEDICINE

## 2023-08-18 PROCEDURE — 99215 PR OFFICE/OUTPT VISIT, EST, LEVL V, 40-54 MIN: ICD-10-PCS | Mod: HCNC,S$GLB,, | Performed by: INTERNAL MEDICINE

## 2023-08-18 PROCEDURE — 1100F PR PT FALLS ASSESS DOC 2+ FALLS/FALL W/INJURY/YR: ICD-10-PCS | Mod: HCNC,CPTII,S$GLB, | Performed by: INTERNAL MEDICINE

## 2023-08-18 PROCEDURE — 3077F PR MOST RECENT SYSTOLIC BLOOD PRESSURE >= 140 MM HG: ICD-10-PCS | Mod: HCNC,CPTII,S$GLB, | Performed by: INTERNAL MEDICINE

## 2023-08-18 PROCEDURE — 99215 OFFICE O/P EST HI 40 MIN: CPT | Mod: HCNC,S$GLB,, | Performed by: INTERNAL MEDICINE

## 2023-08-18 PROCEDURE — 3008F BODY MASS INDEX DOCD: CPT | Mod: HCNC,CPTII,S$GLB, | Performed by: INTERNAL MEDICINE

## 2023-08-18 PROCEDURE — 36415 COLL VENOUS BLD VENIPUNCTURE: CPT | Mod: HCNC | Performed by: INTERNAL MEDICINE

## 2023-08-18 PROCEDURE — 1125F AMNT PAIN NOTED PAIN PRSNT: CPT | Mod: HCNC,CPTII,S$GLB, | Performed by: INTERNAL MEDICINE

## 2023-08-18 NOTE — PROGRESS NOTES
MEDICAL HISTORY:  Hypertension.  Hyperlipidemia.  Chronic kidney disease stage 3  Gastroesophageal reflux disease with history of dilatation.  Depression, anxiety.  Bipolar disorder  Fibromyalgia.  Lumbar spondylosis with lumbar spinal stenosis  Thoracic degenerative disc disease  Thoracic compression fractures  Restless legs syndrome.  Migraine headache disorder.  Herpes zoster infection.  Chronic iron deficiency anemia      SURGICAL HISTORY:  Complete hysterectomy.  Bilateral Carmichael neuroma neurectomies with hammertoe repair.  Tonsillectomy.  .  Right elbow surgery due to epicondylitis.  LASIK surgery in both eyes.  Osteotomies involving the mandibulomaxillary region of the face with chin   augmentation.  Right femur fracture with intramedullary neil placement.  Posttraumatic fractures involving her right ankle, left elbow and clavicle.     MEDICATIONS:  Abilify 15 mg daily  Metoprolol  mg daily  Acetaminophen 500 mg two tablets twice a day as needed.  Atorvastatin 20 mg daily.  Cyclobenzaprine 10 mg three times a day as needed., usually once a day  Ynmdrtofyh10 mg at night.  Nexium 40 mg daily.  Prozac 20 mg daily.  Lamictal 200 mg twice a day.  Ativan 0.5 mg one pill a day b.i.d. as needed   Imitrex as needed for migraine headaches.  Losartan -25 mg daily  Zyprexa 5 mg   Gabapentin 100 mg b.i.d.  Wellbutrin  mg  Melatonin   Naproxen 500 mg b.i.d.      [age] female  Follow-up visit   Hospital admit .    Admit for auditory visual hallucinations and weakness.  It was noted upon presentation hemoglobin was 4.1.  Evaluation confirmed both B12 deficiency and iron deficiency.  She received 3 units of blood in his symptoms improved.    During the hospital admit while walking she tripped and fractured the left 4th metatarsal.  She was put in a boot at the time but currently she is feels more comfortable wearing special shoes.    It was noted in the hospital that  she has snoring and apnea spells.  One point she was placed on CPAP and responded well to it she got a good night's sleep.  She plans on following up with the sleep specialist regarding this    She has been diagnosed with osteoporosis years ago    Medications that were added losartan HCT along with iron and B12 supplementation    During hospital stay CT of the chest showed a pulmonary nodule 9 mm in the was accentuated markings.  She had a 2D echo that was normal other than evidence for pulmonary hypertension with pulmonary artery systolic pressure 46        Last time seen was 2 years ago and in the interim it appears new medicines include Zyprexa, gabapentin, Wellbutrin, naproxen    In the interim she is developed right retinal hemorrhage.  Ultrasound suggested carotid artery disease.  She later had an MRI and CT angiogram which was normal with the exception of minimal plaque right carotid    She also has had epidural steroid injections and piriformis injections involving the vertebrae    She is also had 2 nasal fractures that required surgery due to falls      In the past she was evaluated fine deficiency anemia she is had a colonoscopy and upper endoscopy possibly about 5 years ago.  Colon polyps were noted      On review of symptoms she will have dyspnea on exertion at times.  There is no chest pain but she might feel a sharp twinge now and then.  No abdominal pain.  Occasionally will have indigestion heartburn.  Regular bowel function is stool is normal brown color.  No difficulty urinating.    Examination   Weight 175   Pulse 72   Blood pressure 130/72  Chest clear breath sounds  Heart regular rate rhythm  Abdominal exam soft nontender no hepatosplenomegaly abdominal masses   2+ carotid pulses no bruits 2+ pedal pulses  Extremities no edema      Impression  Iron deficiency anemia   B12 deficiency  Bipolar disorder  Hypertension   Hyperlipidemia  Chronic kidney disease stage IIIB  Pulmonary  hypertension  Pulmonary nodule  Carotid artery disease   Osteoporosis   Obstructive sleep apnea  GERD  Lumbar spondylosis   Thoracic spondylosis    Plan

## 2023-08-19 ENCOUNTER — PATIENT MESSAGE (OUTPATIENT)
Dept: INTERNAL MEDICINE | Facility: CLINIC | Age: 68
End: 2023-08-19
Payer: MEDICARE

## 2023-08-19 LAB — VIT B12 SERPL-MCNC: >1400 NG/L (ref 180–914)

## 2023-08-28 ENCOUNTER — DOCUMENTATION ONLY (OUTPATIENT)
Dept: REHABILITATION | Facility: HOSPITAL | Age: 68
End: 2023-08-28

## 2023-08-28 NOTE — PROGRESS NOTES
Pt did not attend her scheduled 8 AM PT evaluation today. No charges posted to account.    Abdelrahman Richardson, PT  8/28/2023

## 2023-08-29 ENCOUNTER — HOSPITAL ENCOUNTER (OUTPATIENT)
Dept: RADIOLOGY | Facility: HOSPITAL | Age: 68
Discharge: HOME | End: 2023-08-29
Attending: INTERNAL MEDICINE
Payer: MEDICARE

## 2023-08-29 DIAGNOSIS — M81.0 OSTEOPOROSIS, UNSPECIFIED OSTEOPOROSIS TYPE, UNSPECIFIED PATHOLOGICAL FRACTURE PRESENCE: ICD-10-CM

## 2023-08-29 PROCEDURE — 77080 DXA BONE DENSITY AXIAL: CPT | Mod: TC

## 2023-08-29 PROCEDURE — 77080 DXA BONE DENSITY AXIAL SKELETON 1 OR MORE SITES: ICD-10-PCS | Mod: 26,,, | Performed by: INTERNAL MEDICINE

## 2023-08-29 PROCEDURE — 77080 DXA BONE DENSITY AXIAL: CPT | Mod: 26,,, | Performed by: INTERNAL MEDICINE

## 2023-09-12 ENCOUNTER — OUTPATIENT CASE MANAGEMENT (OUTPATIENT)
Dept: ADMINISTRATIVE | Facility: OTHER | Age: 68
End: 2023-09-12
Payer: MEDICARE

## 2023-09-14 ENCOUNTER — OUTPATIENT CASE MANAGEMENT (OUTPATIENT)
Dept: ADMINISTRATIVE | Facility: OTHER | Age: 68
End: 2023-09-14
Payer: MEDICARE

## 2023-09-14 NOTE — LETTER
Elly Harris  [address] [place] [address]   [place] [address]    Dear Elly Harris,     Welcome to [facility]s Outpatient Care Management Program. We are here to assist patients with multiple long-term (chronic) conditions who often need more personalized healthcare.    It was a pleasure talking with you today. My name is Kellen Antonio RN. I look forward to working with you as your Care Manager. I will be contacting you by telephone routinely to help coordinate care and resolve issues.    My goal is to help you function at the healthiest and highest level possible. You can contact me directly at [phone].    As an [facility] patient with [employer], some of the services we provide, at no cost to you, include:     Development of an individualized care plan with a Registered Nurse   Connection with a   Assistance from a Community Health Worker  Connection with available resources and services    Coordinate communication among your care team members   Provide coaching and education   Help you understand your doctors treatment plan  Help you obtain information about your insurance coverage.     All services provided by [facility]s Outpatient Care Managers and other care team members are coordinated with and communicated to your primary care team.      As part of your enrollment, you will be receiving education materials and more information about these services in your [facility] account, by phone, or through the mail. If you do not wish to participate or receive information, you can Opt Out by contacting our office at [phone].      Sincerely,        Kellen Antonio RN  [facility]   Outpatient Care Management                     [facility]:  Kellen Antonio RN, [facility]- [facility] Nurse   Tel:[phone] Mon-Fri, 8 am- 4:30 pm    Maral Sanders LMSW- [facility] ,   TEL: [phone],  Mon-Fri, 8 am- 4:30 pm    [facility], 24/7 Nurse  "Help Line (non emergent)- TEL:  [phone]    [facility] Technical Issue Support Team--TEL:  [phone], Mon-Fri 9 am- 5 pm.    [email or web address] online patient portal    [facility] Financial Assistance TEL:  [phone]    Digital Medicine Program- TEL:  [phone]        [employer] Managed Care:    [employer] Nupur Life Line Benefit- TEL:  [phone]    [employer] First 24 Hour Nurse Line--TEL:  [phone]    [employer] Managed Care,  Over-the-Counter Benefit-- TEL:  [phone]  On line:  [employer] select "Over-the-Counter (OTC) items from the "Shop OTC & Supplies drop down at the top of the page    [employer] Managed Care, eahernandez Exercise- Sarah Gooden [facility]  On line [employer] Mobile Exercise and Health Education Resources        [email or web address]    [employer] Managed Care, "Mom's Meal", TEL: [phone]    [employer] Managed Care - Transportation Reservation-- TEL:  [phone]  Mon-Fri, 8 am-5 pm, 3 business days notice required                "

## 2023-09-15 NOTE — ASSESSMENT & PLAN NOTE
Accompanied by Mom Sandra    Parental Concerns None    [place]    Second Hand Smoke Exposure No    Pets Yes    Parental Hearing Concerns No    Parental Vision Concerns No    Dentist Yes    Cholesterol/Heart Risk Low Risk    TB Risk  Low Risk  Child Born Outside of [place] No  Contact with anyone with TB No  Contact with anyone with positive PPD No    Vision   Far     R 20/20 L 20/20  Near  R 20/20 L 20/20    Hearing Passed    Health Maintenance Due   Topic Date Due   • COVID-19 Vaccine (1) Never done   • HPV Vaccine (1 - 2-dose series) Never done   • Meningococcal Serogroup B Vaccine (2 of 2 - Risk Bexsero 2-dose series) 10/07/2022   • Depression Screening  05/12/2023   • Influenza Vaccine (1) 09/01/2023   • Annual Physical (ages 3-18)  09/09/2023       Patient is due for topics listed above, she wishes to proceed with Immunization(s) Meningococcal Serogroup B, but is not proceeding with Immunization(s) COVID-19, HPV and Influenza at this time.    Could be side effect of Zyprexa or secondary to bipolar disorder now with psychosis futures

## 2023-09-18 ENCOUNTER — OUTPATIENT CASE MANAGEMENT (OUTPATIENT)
Dept: ADMINISTRATIVE | Facility: OTHER | Age: 68
End: 2023-09-18
Payer: MEDICARE

## 2023-09-18 NOTE — LETTER
Elly Harris  [address]   [place] [address]      Dear Elly Harris,     I am writing from the Outpatient Complex Care Management Department at [facility].  I received a referral from Kellen Antonio RN to contact you regarding any needs you may have. I was unable to reach you by phone. Please contact me for assistance.     I can be reached at [phone] Monday thru Friday from 8:00am to 4:30pm.      Additionally, [facility] also has a program with a nurse available 24/7 to answer questions or provide medical advice.  [facility] can be reached at [phone].      Sincerely,        Maral Sanders LMSW  Outpatient Care Management

## 2023-09-18 NOTE — PROGRESS NOTES
Outpatient Care Management  Initial Patient Assessment    Patient: Elly Harris  MRN: [id number]  Date of Service: 09/14/2023  Completed by: Kellen Antonio RN  Referral Date: 08/03/2023  Program: High Risk  Status: Ongoing  Effective Dates: 9/14/2023 - present  Responsible Staff: Kellen Antonio, RN (Inactive)        Reason for Visit   Patient presents with    [facility] Enrollment Call    Initial Assessment       Brief Summary:  Elly Harris was referred to [facility] 9/3/2023 while [facility] for c/o visual and auditory hallucinations x 3 days-- seeing spiders, mice, men with knives. 2 weeks prior she was started on Zyprea but Elly can not say it the hallucinations are associated with the new rx. Received 3 units blood for HBG=4.1. New rxs for B12 & Iron deficiencies. New rx Losartan-HCTz 100 mg -25 mg once daily for HTN. In ED BP = 178/81. S/p PCP appt, Dr. Preston 8/18/2023-/70, HR 72, labs- Hbg 9.7, B12 >1400, Bun 25/Cr 1.3, eGFR 44.5.  Elly no longer has a home BP machine. Her wrist machine was not accurate. Elly reports adhering to all 3 new rxs. Zyprexa will be discontinued soon. Unable to afford Vraylar prescribed by her non [facility]iat[facility], Dr. Farzana Ta. Other non [facility] providers -Pain Mgmnt, Dr. Eduardo Wilcox ([facility] [place] Ophthalm, Dr. Gerardo aMne & Neuro- Dr. Jaswinder Rico[facility]   [facility] Chintan Connelly 10/12/2023.   Chief concerns stated by Elly are for food sources, asks about Meals on Wheels, financial assistance- worries that her money will run out. Has tried without success to find employment enough to provide her with some extra income. C/o pain to upper/lower back, neck, legs along with nerve pain--with h/o osteoporosis with pathological fxs, arthritis, lumbar spondylosis. Pain is 8/10 rating, stabbing, achey, muscle spasms.Tylenol 500 mg (2) tabs, Wellbutrin, Flexeril, Cymbalta, Gabapentin,napros. Patient is eligible for program based on 65.4% risk score.    Active problem list, medical, surgical and social history reviewed. Active comorbidities include HTN, Pulm HTN, HLD, symptomatic Anemia, Depression, Psychosis, Bipolar 2 disorder, Migraines, Fibromyalgia, GERD, CESIA, Osteoporosis, Closed displaced fx for 4th metatarsal bone right foot. Areas of need identified by patient include food sources, asks about Meals on Wheels, financial assistance- worries that her money will run out. Has tried without success to find employment enough to provide her with some extra income.  Referring to [facility]  To provide education on HTN and Osteoporosis.   Next steps: F/u call to patient in one week in and around 9/20/2023.     Disability Status  Is the patient alert and oriented (person, place, time, and situation)?: Alert and oriented x 4  Hearing Difficulty or Deaf: yes  Hearing Management: -- (Gets her dgt to repeat 3-4 x- hearing loss yes. 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 H/o falls. Off balance.)  Mobility Management: rollator  Dressing/Bathing Difficulty: no  Toileting : Independent (Takes shower --walk in)  Continence :  Continence - Not a problem  Difficulty Managing Errands Independently: yes  Errands Management: -- (With her limited vision she doesn't drive as much anymore. Her daughter doesn't drive and her brother is disabled.)  Equipment Currently Used at Home: grab bar; rollator (elevated toilet seat-- with arm rests.)  ADL Conclusion Statement: -- (Pt is independent with her ADLs but is affected by her degree of pain to multiple joints ie neck, upper/lower back, legs d/t arthritis, osteoporosis, compression fxs.)  Change in Functional Status Since Onset of Current Illness/Injury: yes        Spiritual Beliefs  Spiritual, Cultural Beliefs, Mu-ism Practices, Values that Affect Care: no (Yazidi)      Social History     Socioeconomic History    Marital status:     Number of children: 2   Occupational History     Employer: [employer]   Tobacco Use    Smoking status: Former     Current packs/day: 0.00     Types: Cigarettes     Quit date: 1979     Years since quittin.7    Smokeless tobacco: Former   Substance and Sexual Activity    Alcohol use: No    Drug use: Never     Social Determinants of Health     Financial Resource Strain: Medium Risk (10/17/2019)    Overall Financial Resource Strain (CARDIA)     Difficulty of Paying Living Expenses: Somewhat hard   Transportation Needs: No Transportation Needs (10/17/2019)    PRAPARE - Transportation     Lack of Transportation (Medical): No     Lack of Transportation (Non-Medical): No   Physical Activity: Inactive (10/17/2019)    Exercise Vital Sign     Days of Exercise per Week: 0 days     Minutes of Exercise per Session: 0 min   Stress: Stress Concern Present (10/17/2019)    [employer] [place] [employer] - Occupational Stress Questionnaire     Feeling of Stress : To some extent   Social Connections: Unknown (10/17/2019)    Social Connection and Isolation Panel [NHANES]     Frequency of Communication with Friends and Family: Three times a week      Frequency of Social Gatherings with Friends and Family: Once a week     Active Member of Clubs or Organizations: Yes     Attends Club or Organization Meetings: More than 4 times per year     Marital Status:        Roles and Relationships  Primary Source of Support/Comfort: child(tarik)  Name of Support/Comfort Primary Source: Tamica Rush  Primary Roles/Responsibilities: retired      Advance Directives (For Healthcare)  Advance Directive  (If Adv Dir status is received, view document under Adv Dir in header or Chart Review Media tab): Patient does not have Advance Directive, declines information. (Reports she has the necessary paperwork but not filled out. Her daughter is aware of her wishes pt states.)        Patient Reported Insurance  Verified current insurance plan:: [employer] Medicare Advantage  [employer] benefits discussed:: OTC Prescription Discounts            9/14/2023     9:53 AM 2/25/2021     1:28 PM   Depression Patient Health Questionnaire   Over the last two weeks how often have you been bothered by little interest or pleasure in doing things More than half the days Not at all   Over the last two weeks how often have you been bothered by feeling down, depressed or hopeless Not at all Not at all   PHQ-2 Total Score 2 0       Learning Assessment       09/14/2023 1228 [facility] (9/14/2023 - Present)   Created by Kellen Antonio RN -  (Nurse) Status: Complete                 PRIMARY LEARNER     Primary Learner Name:  Elly Harris MM - 09/14/2023 1228    Relationship:  Patient MM - 09/14/2023 1228    Does the primary learner have any barriers to learning?:  No Barriers MM - 09/14/2023 1228    What is the preferred language of the primary learner?:  English MM - 09/14/2023 1228    Is an  required?:  No MM - 09/14/2023 1228    How does the primary learner prefer to learn new concepts?:  Listening, Reading MM - 09/14/2023 1228    How often do you need to have someone  help you read instructions, pamphlets, or written material from your doctor or pharmacy?:  Never MM - 09/14/2023 1228        CO-LEARNER #1     No question answered        CO-LEARNER #2     No question answered        SPECIAL TOPICS     No question answered        ANSWERED BY:     No question answered        Edit Kellen Buenrostro, RN -  (Nurse)   09/14/2023 6181

## 2023-09-22 ENCOUNTER — HOSPITAL ENCOUNTER (OUTPATIENT)
Facility: HOSPITAL | Age: 68
Discharge: HOME | End: 2023-09-25
Attending: STUDENT IN AN ORGANIZED HEALTH CARE EDUCATION/TRAINING PROGRAM | Admitting: FAMILY MEDICINE
Payer: MEDICARE

## 2023-09-22 DIAGNOSIS — T14.90XA BLUNT TRAUMA: ICD-10-CM

## 2023-09-22 DIAGNOSIS — N17.9 ACUTE KIDNEY INJURY: Primary | ICD-10-CM

## 2023-09-22 DIAGNOSIS — S02.2XXA CLOSED FRACTURE OF NASAL BONE, INITIAL ENCOUNTER: ICD-10-CM

## 2023-09-22 DIAGNOSIS — Z00.8 MEDICAL CLEARANCE FOR PSYCHIATRIC ADMISSION: ICD-10-CM

## 2023-09-22 DIAGNOSIS — R07.9 CHEST PAIN: ICD-10-CM

## 2023-09-22 LAB
ALBUMIN SERPL BCP-MCNC: 3.9 G/DL (ref 3.5–5.2)
ALP SERPL-CCNC: 106 U/L (ref 55–135)
ALT SERPL W/O P-5'-P-CCNC: 18 U/L (ref 10–44)
AMPHET+METHAMPHET UR QL: NEGATIVE
ANION GAP SERPL CALC-SCNC: 10 MMOL/L (ref 8–16)
APAP SERPL-MCNC: <3 UG/ML (ref 10–20)
AST SERPL-CCNC: 26 U/L (ref 10–40)
BACTERIA #/AREA URNS AUTO: ABNORMAL /HPF
BARBITURATES UR QL SCN>200 NG/ML: NEGATIVE
BASOPHILS # BLD AUTO: 0.04 K/UL (ref 0–0.2)
BASOPHILS NFR BLD: 0.4 % (ref 0–1.9)
BENZODIAZ UR QL SCN>200 NG/ML: ABNORMAL
BILIRUB SERPL-MCNC: 0.6 MG/DL (ref 0.1–1)
BILIRUB UR QL STRIP: NEGATIVE
BUN SERPL-MCNC: 33 MG/DL (ref 8–23)
BZE UR QL SCN: NEGATIVE
CALCIUM SERPL-MCNC: 9.8 MG/DL (ref 8.7–10.5)
CANNABINOIDS UR QL SCN: NEGATIVE
CHLORIDE SERPL-SCNC: 108 MMOL/L (ref 95–110)
CLARITY UR REFRACT.AUTO: CLEAR
CO2 SERPL-SCNC: 20 MMOL/L (ref 23–29)
COLOR UR AUTO: YELLOW
CREAT SERPL-MCNC: 1.8 MG/DL (ref 0.5–1.4)
CREAT UR-MCNC: 232 MG/DL (ref 15–325)
DIFFERENTIAL METHOD: ABNORMAL
EOSINOPHIL # BLD AUTO: 0.2 K/UL (ref 0–0.5)
EOSINOPHIL NFR BLD: 1.8 % (ref 0–8)
ERYTHROCYTE [DISTWIDTH] IN BLOOD BY AUTOMATED COUNT: 28.6 % (ref 11.5–14.5)
EST. GFR  (NO RACE VARIABLE): 30.1 ML/MIN/1.73 M^2
ETHANOL SERPL-MCNC: <10 MG/DL
GLUCOSE SERPL-MCNC: 100 MG/DL (ref 70–110)
GLUCOSE UR QL STRIP: NEGATIVE
HCT VFR BLD AUTO: 31.3 % (ref 37–48.5)
HGB BLD-MCNC: 9.2 G/DL (ref 12–16)
HGB UR QL STRIP: NEGATIVE
HYALINE CASTS UR QL AUTO: 50 /LPF
IMM GRANULOCYTES # BLD AUTO: 0.04 K/UL (ref 0–0.04)
IMM GRANULOCYTES NFR BLD AUTO: 0.4 % (ref 0–0.5)
KETONES UR QL STRIP: NEGATIVE
LEUKOCYTE ESTERASE UR QL STRIP: ABNORMAL
LYMPHOCYTES # BLD AUTO: 1 K/UL (ref 1–4.8)
LYMPHOCYTES NFR BLD: 10 % (ref 18–48)
MCH RBC QN AUTO: 25.4 PG (ref 27–31)
MCHC RBC AUTO-ENTMCNC: 29.4 G/DL (ref 32–36)
MCV RBC AUTO: 87 FL (ref 82–98)
METHADONE UR QL SCN>300 NG/ML: NEGATIVE
MICROSCOPIC COMMENT: ABNORMAL
MONOCYTES # BLD AUTO: 0.9 K/UL (ref 0.3–1)
MONOCYTES NFR BLD: 8.5 % (ref 4–15)
NEUTROPHILS # BLD AUTO: 8 K/UL (ref 1.8–7.7)
NEUTROPHILS NFR BLD: 78.9 % (ref 38–73)
NITRITE UR QL STRIP: NEGATIVE
NRBC BLD-RTO: 0 /100 WBC
OPIATES UR QL SCN: NEGATIVE
PCP UR QL SCN>25 NG/ML: NEGATIVE
PH UR STRIP: 6 [PH] (ref 5–8)
PLATELET # BLD AUTO: 202 K/UL (ref 150–450)
PMV BLD AUTO: 10.4 FL (ref 9.2–12.9)
POTASSIUM SERPL-SCNC: 3.6 MMOL/L (ref 3.5–5.1)
PROT SERPL-MCNC: 6.7 G/DL (ref 6–8.4)
PROT UR QL STRIP: ABNORMAL
RBC # BLD AUTO: 3.62 M/UL (ref 4–5.4)
RBC #/AREA URNS AUTO: 3 /HPF (ref 0–4)
SALICYLATES SERPL-MCNC: <5 MG/DL (ref 15–30)
SODIUM SERPL-SCNC: 138 MMOL/L (ref 136–145)
SP GR UR STRIP: 1.03 (ref 1–1.03)
SQUAMOUS #/AREA URNS AUTO: 8 /HPF
TOXICOLOGY INFORMATION: ABNORMAL
TSH SERPL DL<=0.005 MIU/L-ACNC: 2.93 UIU/ML (ref 0.4–4)
URN SPEC COLLECT METH UR: ABNORMAL
WBC # BLD AUTO: 10.09 K/UL (ref 3.9–12.7)
WBC #/AREA URNS AUTO: 13 /HPF (ref 0–5)
WBC CLUMPS UR QL AUTO: ABNORMAL

## 2023-09-22 PROCEDURE — 85025 COMPLETE CBC W/AUTO DIFF WBC: CPT | Mod: HCNC | Performed by: STUDENT IN AN ORGANIZED HEALTH CARE EDUCATION/TRAINING PROGRAM

## 2023-09-22 PROCEDURE — 80143 DRUG ASSAY ACETAMINOPHEN: CPT | Mod: HCNC | Performed by: STUDENT IN AN ORGANIZED HEALTH CARE EDUCATION/TRAINING PROGRAM

## 2023-09-22 PROCEDURE — 84443 ASSAY THYROID STIM HORMONE: CPT | Mod: HCNC | Performed by: STUDENT IN AN ORGANIZED HEALTH CARE EDUCATION/TRAINING PROGRAM

## 2023-09-22 PROCEDURE — 99285 EMERGENCY DEPT VISIT HI MDM: CPT | Mod: 25,HCNC

## 2023-09-22 PROCEDURE — 25000003 PHARM REV CODE 250: Mod: HCNC | Performed by: EMERGENCY MEDICINE

## 2023-09-22 PROCEDURE — 93010 ELECTROCARDIOGRAM REPORT: CPT | Mod: HCNC,,, | Performed by: INTERNAL MEDICINE

## 2023-09-22 PROCEDURE — 63600175 PHARM REV CODE 636 W HCPCS: Mod: HCNC | Performed by: STUDENT IN AN ORGANIZED HEALTH CARE EDUCATION/TRAINING PROGRAM

## 2023-09-22 PROCEDURE — 25500020 PHARM REV CODE 255: Mod: HCNC | Performed by: STUDENT IN AN ORGANIZED HEALTH CARE EDUCATION/TRAINING PROGRAM

## 2023-09-22 PROCEDURE — 96365 THER/PROPH/DIAG IV INF INIT: CPT | Mod: HCNC

## 2023-09-22 PROCEDURE — 81001 URINALYSIS AUTO W/SCOPE: CPT | Mod: HCNC | Performed by: STUDENT IN AN ORGANIZED HEALTH CARE EDUCATION/TRAINING PROGRAM

## 2023-09-22 PROCEDURE — 87086 URINE CULTURE/COLONY COUNT: CPT | Mod: HCNC | Performed by: STUDENT IN AN ORGANIZED HEALTH CARE EDUCATION/TRAINING PROGRAM

## 2023-09-22 PROCEDURE — 82077 ASSAY SPEC XCP UR&BREATH IA: CPT | Mod: HCNC | Performed by: STUDENT IN AN ORGANIZED HEALTH CARE EDUCATION/TRAINING PROGRAM

## 2023-09-22 PROCEDURE — 99223 PR INITIAL HOSPITAL CARE,LEVL III: ICD-10-PCS | Mod: HCNC,,, | Performed by: PHYSICIAN ASSISTANT

## 2023-09-22 PROCEDURE — 93010 EKG 12-LEAD: ICD-10-PCS | Mod: HCNC,,, | Performed by: INTERNAL MEDICINE

## 2023-09-22 PROCEDURE — 93005 ELECTROCARDIOGRAM TRACING: CPT | Mod: HCNC

## 2023-09-22 PROCEDURE — 80179 DRUG ASSAY SALICYLATE: CPT | Mod: HCNC | Performed by: STUDENT IN AN ORGANIZED HEALTH CARE EDUCATION/TRAINING PROGRAM

## 2023-09-22 PROCEDURE — 63600175 PHARM REV CODE 636 W HCPCS: Mod: HCNC | Performed by: EMERGENCY MEDICINE

## 2023-09-22 PROCEDURE — 99223 1ST HOSP IP/OBS HIGH 75: CPT | Mod: HCNC,,, | Performed by: PHYSICIAN ASSISTANT

## 2023-09-22 PROCEDURE — 96361 HYDRATE IV INFUSION ADD-ON: CPT | Mod: HCNC

## 2023-09-22 PROCEDURE — 80307 DRUG TEST PRSMV CHEM ANLYZR: CPT | Mod: HCNC | Performed by: STUDENT IN AN ORGANIZED HEALTH CARE EDUCATION/TRAINING PROGRAM

## 2023-09-22 PROCEDURE — 80053 COMPREHEN METABOLIC PANEL: CPT | Mod: HCNC | Performed by: STUDENT IN AN ORGANIZED HEALTH CARE EDUCATION/TRAINING PROGRAM

## 2023-09-22 RX ADMIN — IOHEXOL 100 ML: 350 INJECTION, SOLUTION INTRAVENOUS at 08:09

## 2023-09-22 RX ADMIN — CEFTRIAXONE 1 G: 1 INJECTION, POWDER, FOR SOLUTION INTRAMUSCULAR; INTRAVENOUS at 10:09

## 2023-09-22 RX ADMIN — SODIUM CHLORIDE, POTASSIUM CHLORIDE, SODIUM LACTATE AND CALCIUM CHLORIDE 1000 ML: 600; 310; 30; 20 INJECTION, SOLUTION INTRAVENOUS at 07:09

## 2023-09-22 NOTE — ED PROVIDER NOTES
Source of History  patient and EMR    Chief Complaint    Hallucinations (Patient states seeing bugs crawling on her body and in her house, has seen multiple people (hallucinations) and states she speaks to them. Patient unkempt, fecal matter on clothing)      History of Present Illness    Elly Harris is a 68 y.o. female presenting with concerns for hallucinations.  She also has auditory hallucinations.  Has bipolar disorder.  She is supposed to be on aripiprazole, bupropion, duloxetine, lamotrigine, olanzapine  She has been out of 1 of her medications due to financial issues and them not authorizing it, she lost her sample prescription.  She reports auditory and visual hallucinations, responding to internal stimuli   No SI or HI  Multiple bruising from recent trauma, stating that fibromyalgia and moving with her walker she often falls.  Today, she slipped in the bathroom and fell.  She hit her head.  No LOC.  She has had multiple prior falls.  Her falls seem to be non syncopal in nature, stating that occasionally he was because she was transferring from walking with her walker to not, or when she slips on slippery floor.    Other history includes hypertension hyperlipidemia.  She also has chronic fibromyalgia was difficulty with ambulation so she is constantly using a walker.    Review of Systems    As per HPI and below:  Constitutional symptoms:  No chills, no sweats, no fever   Skin symptoms:  No rash    Eye symptoms:  No blurred vision  ENMT symptoms:  No sore throat    Respiratory symptoms:  No shortness of breath  Cardiovascular symptoms:  No chest pain   Gastrointestinal symptoms:  No abdominal pain, no nausea, no vomiting, no diarrhea  Genitourinary symptoms:  No dysuria  Musculoskeletal symptoms:  No back pain, knee bruising and pain following fall, bilateral shoulders with bruising   Neurologic symptoms:  No headache, no weakness  Psych symptoms: +hallucinatinos, no SI/HI  Endocrine symptoms:  None  except as in HPI     Past History    As per HPI and below:  Past Medical History:   Diagnosis Date    Anemia     Depression     Fibromyalgia     GERD (gastroesophageal reflux disease)     Herpes zoster infection of thoracic region 2014    left    Hyperlipidemia     Hypertension     Migraine headache     RLS (restless legs syndrome)        Past Surgical History:   Procedure Laterality Date    BILATERAL SALPINGOOPHORECTOMY       SECTION      CLOSED REDUCTION OF FRACTURE OF NASAL BONE Bilateral 2021    Procedure: CLOSED REDUCTION, FRACTURE, NASAL BONE;  Surgeon: JV Connelly MD;  [facility]: [facility];  Service: ENT;  Laterality: Bilateral;    CLOSED REDUCTION OF FRACTURE OF NASAL BONE Bilateral 2021    Procedure: CLOSED REDUCTION, FRACTURE, NASAL BONE;  Surgeon: JV Connelly MD;  Location: [facility] OR;  Service: ENT;  Laterality: Bilateral;    DEBRIDEMENT TENNIS ELBOW      FEMUR SURGERY  2013    FOOT NEUROMA SURGERY Bilateral     with hammertoe repair    HYSTERECTOMY      vaginal    LASIK      MANDIBLE FRACTURE SURGERY      TONSILLECTOMY      TOTAL VAGINAL HYSTERECTOMY         Social History     Socioeconomic History    Marital status:     Number of children: 2   Occupational History     Employer: [employer]   Tobacco Use    Smoking status: Former     Current packs/day: 0.00     Types: Cigarettes     Quit date: 1979     Years since quittin.7    Smokeless tobacco: Former   Substance and Sexual Activity    Alcohol use: No    Drug use: Never     Social Determinants of Health     Financial Resource Strain: Medium Risk (10/17/2019)    Overall Financial Resource Strain (CARDIA)     Difficulty of Paying Living Expenses: Somewhat hard   Transportation Needs: No Transportation Needs (10/17/2019)    PRAPARE - Transportation     Lack of Transportation (Medical): No     Lack of Transportation (Non-Medical): No   Physical Activity: Inactive (10/17/2019)    Exercise Vital  Sign     Days of Exercise per Week: 0 days     Minutes of Exercise per Session: 0 min   Stress: Stress Concern Present (10/17/2019)    [employer] [place] [employer] - Occupational Stress Questionnaire     Feeling of Stress : To some extent   Social Connections: Unknown (10/17/2019)    Social Connection and Isolation Panel [NHANES]     Frequency of Communication with Friends and Family: Three times a week     Frequency of Social Gatherings with Friends and Family: Once a week     Active Member of Clubs or Organizations: Yes     Attends Club or Organization Meetings: More than 4 times per year     Marital Status:        Family History   Problem Relation Age of Onset    Hyperlipidemia Mother     Alzheimer's disease Mother     Hypertension Father     Asbestos Father     Lymphoma Brother     COPD Brother     Heart disease Brother        Review of patient's allergies indicates:   Allergen Reactions    Doxycycline Rash and Hives       No current facility-administered medications on file prior to encounter.     Current Outpatient Medications on File Prior to Encounter   Medication Sig Dispense Refill    acetaminophen (TYLENOL) 500 MG tablet Take 1,000 mg by mouth 2 (two) times daily as needed for Pain.      ARIPiprazole (ABILIFY) 15 MG Tab Take 15 mg by mouth once daily.      atorvastatin (LIPITOR) 20 MG tablet Take 1 tablet by mouth once daily. 90 tablet 0    blood-gluc,BP meter,adult cuff Shira 1 each by Misc.(Non-Drug; Combo Route) route every morning. 1 each 0    buPROPion (WELLBUTRIN XL) 150 MG TB24 tablet Take 150 mg by mouth once daily.      cyanocobalamin (VITAMIN B-12) 250 MCG tablet Take 1 tablet (250 mcg total) by mouth once daily. 30 tablet 5    cyclobenzaprine (FLEXERIL) 10 MG tablet Take 1 tablet (10 mg total) by mouth 3 (three) times daily as needed. (Patient taking differently: Take 10 mg by mouth 3 (three) times daily as needed for Muscle spasms.) 90 tablet 3    DULoxetine (CYMBALTA) 30 MG  capsule Take 30 mg by mouth every evening. (60MG QAM & 30MG QPM)      DULoxetine (CYMBALTA) 60 MG capsule Take 1 capsule (60 mg total) by mouth once daily. (Patient taking differently: Take 60 mg by mouth every morning. (60MG QAM & 30MG QPM)) 90 capsule 1    esomeprazole (NEXIUM) 40 MG capsule Take 1 capsule (40 mg total) by mouth once daily. 30 capsule 5    ferrous sulfate 325 (65 FE) MG EC tablet Take 1 tablet (325 mg total) by mouth once daily. 30 tablet 11    FLUoxetine 20 MG capsule Take 20 mg by mouth once daily.      gabapentin (NEURONTIN) 100 MG capsule Take 1 capsule (100 mg total) by mouth 2 (two) times daily. 60 capsule 11    lamoTRIgine (LAMICTAL) 200 MG tablet Take 200 mg by mouth 2 (two) times daily.      lorazepam (ATIVAN) 0.5 MG tablet Take 1 tablet (0.5 mg total) by mouth daily as needed. (Patient taking differently: Take 0.5 mg by mouth daily as needed for Anxiety.) 30 tablet 1    losartan-hydrochlorothiazide 100-25 mg (HYZAAR) 100-25 mg per tablet Take 1 tablet by mouth once daily. 90 tablet 3    melatonin (MELATIN) 5 mg Take 10 mg by mouth nightly as needed (sleep).      metoprolol succinate (TOPROL-XL) 100 MG 24 hr tablet Take 1 tablet by mouth daily. 90 tablet 0    naproxen (NAPROSYN) 500 MG tablet Take 500 mg by mouth 2 (two) times daily with meals.      OLANZapine (ZYPREXA) 5 MG tablet Take 5 mg by mouth once daily.      polyethylene glycol (GLYCOLAX) 17 gram PwPk Take 17 g by mouth 2 (two) times daily as needed (constipation caused by iron supplement).  0    sumatriptan (IMITREX) 100 MG tablet TAKE ONE TABLET BY MOUTH AT ONSET, THEN ONE TABLET 2 HOURS LATER FOR EACH EPISODE 9 tablet 0    triamcinolone acetonide 0.1% (KENALOG) 0.1 % cream          Physical Exam    Reviewed nursing notes.  Vitals:    09/22/23 1616   BP: 107/61   BP Location: Right arm   Patient Position: Sitting   Pulse: 96   Resp: 16   Temp: 98.3 °F (36.8 °C)   TempSrc: Oral   SpO2: 95%   Weight: 77.1 kg (170 lb)   Height: 5'  "7" (1.702 m)     General:  Alert, no acute distress.    Skin:  Warm, dry, intact.  No rash.  Head:  Normocephalic, hematoma and abrasion to the forehead, hematoma to the right cheek.    Neck:  Supple.   HEENT:  Pupils are equal and round, appropriate for room, extraocular movements are intact.  Normal phonation.  Moist mucous membranes.  Cardiovascular:  Regular rate and rhythm, Normal peripheral perfusion, No edema.    Respiratory:  Lungs are clear to auscultation, respirations are non-labored, breath sounds are equal.    Gastrointestinal:  Soft, Nontender, Non distended.   Back:  Nontender.  Large ecchymosis to the right flank.  Musculoskeletal:  Normal range of motion observed.  Bruising to the bilateral shoulders.  Bruising to the right anterior forearm.  Bilateral knee bruising.  Neurological:  Alert and oriented to person, place, time, and situation.  No focal deficits observed.   Psychiatric:  Cooperative, appropriate mood & affect.   Not responding to internal stimuli, no SI, no HI.    Initial MDM and Data Review    68 y.o. female presenting for evaluation of concern for falls with multiple areas of bruising, increasing auditory and visual hallucinations secondary to bipolar disorder and being out of 1 of her medications although she is not taken that for several weeks.  Recent hospitalization for anemia.  Not hypoxic, not able to hence.  Afebrile.  Has multiple areas of bruising as noted in physical exam above.    Differential includes:  Blunt trauma, anemia, unstable bipolar disorder, medication nonadherence due to financial constraints, electrolyte disturbance, JESSICA    Work-up includes:  CBC, CMP, lipase, plain films of injured extremities, and CT head to rule out intracranial process, CT neck, CT max face again to rule out any traumatic injury following the multiple falls, and CT chest abdomen pelvis again for traumatic injury given the large area of ecchymosis on the right flank    Interventions include: "  P.r.n. analgesia if needed, IV fluids, Tdap    The patient has significant medical comorbidities that influence decision making for this acute process, such as:  Anemia, chronic debility with fibromyalgia, bipolar disorder, hypertension    I decided to obtain the patient's medical records and review relevant documentation from hospital records and clinic records.  Pertinent information is noted.  Admitted in August for severe microcytic anemia with thrombocytopenia.  She was given blood transfusions.  Psychiatry was consulted at the time.  She also had a pop in her foot while she was walking in the hospital and had a 4th metatarsal fracture.  She was continued on diuretic therapy and hydrochlorothiazide.  She was discharged to short-term nursing facility.  All of her care is through [facility].    Fracture deformity of multiple posterior left ribs, age indeterminate. From 8 /2023    CT lumbar 2022  1.   Chronic appearing anterior wedge compression deformities of T8 with near complete loss of anterior height, T12 with loss of less than one half of the anterior height, and L4 with loss of approximately one half of the anterior height, with mild dorsal displacement of fragments at all levels producing anterior sac deformity and mild thecal sac constriction.         Medications   Tdap (BOOSTRIX) vaccine injection 0.5 mL (has no administration in time range)   lactated ringers bolus 1,000 mL (1,000 mLs Intravenous New Bag 9/22/23 1937)   iohexoL (OMNIPAQUE 350) injection 100 mL (100 mLs Intravenous Given 9/22/23 2004)       Results and ED Course    Labs Reviewed   CBC W/ AUTO DIFFERENTIAL - Abnormal; Notable for the following components:       Result Value    RBC 3.62 (*)     Hemoglobin 9.2 (*)     Hematocrit 31.3 (*)     MCH 25.4 (*)     MCHC 29.4 (*)     RDW 28.6 (*)     Gran # (ANC) 8.0 (*)     Gran % 78.9 (*)     Lymph % 10.0 (*)     All other components within normal limits   COMPREHENSIVE METABOLIC PANEL - Abnormal;  Notable for the following components:    CO2 20 (*)     BUN 33 (*)     Creatinine 1.8 (*)     eGFR 30.1 (*)     All other components within normal limits   URINALYSIS, REFLEX TO URINE CULTURE - Abnormal; Notable for the following components:    Protein, UA Trace (*)     Leukocytes, UA 2+ (*)     All other components within normal limits    Narrative:     Specimen Source->Urine   DRUG SCREEN PANEL, URINE EMERGENCY - Abnormal; Notable for the following components:    Benzodiazepines Presumptive Positive (*)     All other components within normal limits    Narrative:     Specimen Source->Urine   ACETAMINOPHEN LEVEL - Abnormal; Notable for the following components:    Acetaminophen (Tylenol), Serum <3.0 (*)     All other components within normal limits   SALICYLATE LEVEL - Abnormal; Notable for the following components:    Salicylate Lvl <5.0 (*)     All other components within normal limits   URINALYSIS MICROSCOPIC - Abnormal; Notable for the following components:    WBC, UA 13 (*)     WBC Clumps, UA Occasional (*)     Hyaline Casts, UA 50 (*)     All other components within normal limits    Narrative:     Specimen Source->Urine   CULTURE, URINE   TSH   ALCOHOL,MEDICAL (ETHANOL)       Imaging Results              CT Chest Abdomen Pelvis With Contrast (xpd) (In process)  Result time 09/22/23 21:08:34                      CT Maxillofacial Without Contrast (Final result)  Result time 09/22/23 20:34:15      Final result by Rudy Rod MD (09/22/23 20:34:15)                   Impression:      1. Bilateral acute nasal fractures involving the ala and nasal bridge.  Minimal displacement.  Recommend surveillance.  2. Remote traumatic changes of the mandible.  3. This report was flagged in Epic as abnormal.      Electronically signed by: Rudy Rod  Date:    09/22/2023  Time:    20:34               Narrative:    EXAMINATION:  CT MAXILLOFACIAL WITHOUT CONTRAST    CLINICAL HISTORY:  Facial trauma,  blunt;    TECHNIQUE:  Low dose axial images, sagittal and coronal reformations were obtained through the face.  Contrast was not administered.    COMPARISON:  09/05/2021    FINDINGS:  Acute nasal fractures bilaterally involving the nasal bridge and ala.  Minimal displacement.    Zygomatic arches are intact.  The orbits are intact.  No acute fractures of the mandible.  Probable remote trauma of the mandible.    Paranasal sinuses appear intact.  No significant sinus disease mastoid air cell disease.    The globes are within normal limits.    No soft tissue mass or fluid collection is detected.                                       CT Head Without Contrast (Final result)  Result time 09/22/23 20:28:49      Final result by Rudy Rod MD (09/22/23 20:28:49)                   Impression:      No acute intracranial process.      Electronically signed by: Rudy Rod  Date:    09/22/2023  Time:    20:28               Narrative:    EXAMINATION:  CT HEAD WITHOUT CONTRAST    CLINICAL HISTORY:  Facial trauma, blunt;    TECHNIQUE:  Low dose axial CT images obtained throughout the head without intravenous contrast. Sagittal and coronal reconstructions were performed.    COMPARISON:  08/01/2023    FINDINGS:  Intracranial compartment:    Ventricles and sulci are normal in size for age without evidence of hydrocephalus. No extra-axial blood or fluid collections.    Moderate involutional changes.  Mild probable chronic microvascular ischemic changes in the periventricular white matter.  No parenchymal mass, hemorrhage, edema or major vascular distribution infarct.    Skull/extracranial contents (limited evaluation): No fracture. Mastoid air cells and paranasal sinuses are essentially clear.  Scalp contusion anteriorly.                                       CT Cervical Spine Without Contrast (Final result)  Result time 09/22/23 20:40:41      Final result by Rudy Rod MD (09/22/23 20:40:41)                   Impression:       1. No acute abnormality.  2. Multilevel chronic degenerative changes.      Electronically signed by: Rudy Rod  Date:    09/22/2023  Time:    20:40               Narrative:    EXAMINATION:  CT CERVICAL SPINE WITHOUT CONTRAST    CLINICAL HISTORY:  Neck trauma (Age >= 65y);    TECHNIQUE:  Low dose axial CT images through the cervical spine, with sagittal and coronal reformations.  Contrast was not administered.    COMPARISON:  None    FINDINGS:  No acute fractures of the cervical spine.  Alignment is satisfactory.    Mild to moderate disc space narrowing throughout the cervical spine.  Osseous demineralization.  Posterior elements are intact.  Multilevel minimal posterior disc osteophyte complex.    No significant central canal narrowing.  Mild multilevel foraminal narrowing.    Limited evaluation of the intraspinal contents demonstrates no hematoma or mass.Paraspinal soft tissues exhibit no acute abnormalities.                                       X-Ray Knee 3 View Bilateral (Final result)  Result time 09/22/23 20:43:22      Final result by Rudy Rod MD (09/22/23 20:43:22)                   Impression:      No acute radiographic abnormality bilaterally.      Electronically signed by: Rudy Rod  Date:    09/22/2023  Time:    20:43               Narrative:    EXAMINATION:  XR KNEE 3 VIEW BILATERAL    CLINICAL HISTORY:  Injury, unspecified, initial encounter    TECHNIQUE:  AP, lateral, and Merchant views of both knees were performed.    COMPARISON:  None    FINDINGS:  No acute fracture, subluxation or dislocation of the knees bilaterally.  Surgical fixation hardware noted in the distal right femur.  No significant joint effusion bilaterally.  No osseous destruction.  Joint spaces appear adequately maintained.                                       X-Ray Shoulder Trauma Bilateral (Final result)  Result time 09/22/23 20:43:38      Final result by Juan Melgar DO (09/22/23 20:43:38)                    Impression:      No acute fracture or dislocation of the bilateral shoulders.    Acute fractures of the right 7th and 8th ribs.      Electronically signed by: Juan Melgar  Date:    09/22/2023  Time:    20:43               Narrative:    EXAMINATION:  XR SHOULDER TRAUMA 3 VIEW BILATERAL    CLINICAL HISTORY:  Injury, unspecified, initial encounter    TECHNIQUE:  Three views of each shoulder were performed.    COMPARISON:  07/11/2017.    FINDINGS:  There is osteopenia.    Right shoulder: There is no acute fracture or dislocation.  Alignment is normal.  The humeral head is well seated in the glenoid.  There is mild joint space narrowing of the acromioclavicular and glenohumeral joints.    Left shoulder: There is no acute fracture or dislocation. Alignment is normal. The humeral head is well seated in the glenoid. There is mild joint space narrowing of the acromioclavicular and glenohumeral joints.    There are mildly displaced acute fractures of the right 7th and 8th ribs, posterolateral aspects.  There are several remote left-sided rib fractures.                                       X-Ray Forearm Right (Final result)  Result time 09/22/23 20:40:47      Final result by Juan Melgar DO (09/22/23 20:40:47)                   Impression:      Posterior positioning of the distal ulna with respect to the radius, which may be due to patient positioning. Ulnar dislocation not excluded.  Correlate with point tenderness.    No acute fracture.      Electronically signed by: Juan Melgar  Date:    09/22/2023  Time:    20:40               Narrative:    EXAMINATION:  XR FOREARM RIGHT    CLINICAL HISTORY:  Injury, unspecified, initial encounter    TECHNIQUE:  AP and lateral views of the right forearm were performed.    COMPARISON:  None    FINDINGS:  There is osteopenia.  There is no acute fracture.  There is a posterior positioning of the distal ulna with respect to the radius, which may be due to patient positioning.   Ulnar dislocation not excluded.                                      ED Course as of 09/22/23 2114   Fri Sep 22, 2023   1931 Acetaminophen (Tylenol), Serum(!): <3.0 [AC]   1931 Salicylate Lvl(!): <5.0 [AC]   1931 Sodium: 138 [AC]   1931 Potassium: 3.6 [AC]   1931 BUN(!): 33 [AC]   1931 Creatinine(!): 1.8  Slightly elevated [AC]   1931 Hemoglobin(!): 9.2  baseline [AC]   2010 WBC, UA(!): 13 [AC]   2010 Squam Epithel, UA: 8  Not a great sample no indication of infection [AC]   2035 CT Head Without Contrast  No acute intracranial process, specifically no ICH [AC]   2036 Pending remaining imaging reads, consider admission to [facility] due to slight JESSICA [AC]   2052  CT max face  1. Bilateral acute nasal fractures involving the ala and nasal bridge.  Minimal displacement.  Recommend surveillance.  2. Remote traumatic changes of the mandible.  3. This report was flagged in Epic as abnormal. [AC]   2053 X-Ray Forearm Right  Question positioning versus distal ulna dislocation, patient does not examined as if this is dislocated, will repeat plain film [AC]   2053 Will need outpatient ENT follow-up for nasal fractures.  Patient does not have any septal hematoma. [AC]   2112 I notified the patient of the fractures of the nasal bones.  She has had nasal bone fractures in the past.  She follows ENT.  She is likely stable for repeat CMP and then medical clearance, but we are still pending the remainder of her CT scans and final reads, as well as repeat plain film of fore arm.  She was notified of her findings up to this time and that we would need her to see psychiatry to discuss her medications  / hallucinations.  PEC filled out by me.  Signed out to Dr. Cruz pending the remainder of her work up and disposition. [AC]      ED Course User Index  [AC] Emanuel Schaeffer,            EKG interpreted by myself    EKG  Performed: 1917  Rate/Rhythm/Axis: 70 bpm, sinus rhythm, nml axis  QRS 94 ms  Qtc 421 ms  Impression:   Normal Sinus Rhythm.  EKG without evidence of Na channel blockade, K channel blockade, there is no prolonged QTc or digoxin effect.  There is no STEMI or signs of ischemia.  There is some movement artifact.      Relevant imaging interpreted by myself  No ICH  No fx of shoulders    Impression and Plan    68 y.o. female with findings of worsening bipolar disorder with hallucinations and grave disability, multiple falls (nonsyncopal) slight JESSICA based on the work up in the emergency department as above.    Important lab/imaging findings include: as above    All tests, treatment options and disposition options were discussed with the patient.  The decision was made to observe the patient in the ED while awaiting further work up.    The patient was signed out to oncoming attending in stable condition and all further questions/concerns by patient and/or family were addressed.               Final diagnoses:  [Z00.8] Medical clearance for psychiatric admission  [T14.90XA] Blunt trauma  [N17.9] Acute kidney injury (Primary)  [S02.2XXA] Closed fracture of nasal bone, initial encounter                 Emanuel Schaeffer, DO  09/22/23 1480

## 2023-09-23 PROBLEM — N17.9 ACUTE KIDNEY INJURY: Status: ACTIVE | Noted: 2023-09-23

## 2023-09-23 LAB
ANION GAP SERPL CALC-SCNC: 7 MMOL/L (ref 8–16)
ANISOCYTOSIS BLD QL SMEAR: ABNORMAL
BASO STIPL BLD QL SMEAR: ABNORMAL
BASOPHILS # BLD AUTO: 0.03 K/UL (ref 0–0.2)
BASOPHILS NFR BLD: 0.6 % (ref 0–1.9)
BUN SERPL-MCNC: 26 MG/DL (ref 8–23)
CALCIUM SERPL-MCNC: 9 MG/DL (ref 8.7–10.5)
CHLORIDE SERPL-SCNC: 107 MMOL/L (ref 95–110)
CO2 SERPL-SCNC: 24 MMOL/L (ref 23–29)
CREAT SERPL-MCNC: 1.5 MG/DL (ref 0.5–1.4)
DIFFERENTIAL METHOD: ABNORMAL
EOSINOPHIL # BLD AUTO: 0.2 K/UL (ref 0–0.5)
EOSINOPHIL NFR BLD: 4.5 % (ref 0–8)
ERYTHROCYTE [DISTWIDTH] IN BLOOD BY AUTOMATED COUNT: ABNORMAL % (ref 11.5–14.5)
EST. GFR  (NO RACE VARIABLE): 37.5 ML/MIN/1.73 M^2
GIANT PLATELETS BLD QL SMEAR: PRESENT
GLUCOSE SERPL-MCNC: 87 MG/DL (ref 70–110)
HCT VFR BLD AUTO: 26.5 % (ref 37–48.5)
HGB BLD-MCNC: 7.6 G/DL (ref 12–16)
HYPOCHROMIA BLD QL SMEAR: ABNORMAL
IMM GRANULOCYTES # BLD AUTO: 0.01 K/UL (ref 0–0.04)
IMM GRANULOCYTES NFR BLD AUTO: 0.2 % (ref 0–0.5)
LYMPHOCYTES # BLD AUTO: 1.2 K/UL (ref 1–4.8)
LYMPHOCYTES NFR BLD: 21.6 % (ref 18–48)
MCH RBC QN AUTO: 25.3 PG (ref 27–31)
MCHC RBC AUTO-ENTMCNC: 28.7 G/DL (ref 32–36)
MCV RBC AUTO: 88 FL (ref 82–98)
MONOCYTES # BLD AUTO: 0.6 K/UL (ref 0.3–1)
MONOCYTES NFR BLD: 11 % (ref 4–15)
NEUTROPHILS # BLD AUTO: 3.3 K/UL (ref 1.8–7.7)
NEUTROPHILS NFR BLD: 62.1 % (ref 38–73)
NRBC BLD-RTO: 0 /100 WBC
OVALOCYTES BLD QL SMEAR: ABNORMAL
PLATELET # BLD AUTO: 149 K/UL (ref 150–450)
PLATELET BLD QL SMEAR: ABNORMAL
PMV BLD AUTO: 10.9 FL (ref 9.2–12.9)
POIKILOCYTOSIS BLD QL SMEAR: SLIGHT
POLYCHROMASIA BLD QL SMEAR: ABNORMAL
POTASSIUM SERPL-SCNC: 3.4 MMOL/L (ref 3.5–5.1)
RBC # BLD AUTO: 3 M/UL (ref 4–5.4)
SODIUM SERPL-SCNC: 138 MMOL/L (ref 136–145)
WBC # BLD AUTO: 5.36 K/UL (ref 3.9–12.7)

## 2023-09-23 PROCEDURE — 85025 COMPLETE CBC W/AUTO DIFF WBC: CPT | Mod: HCNC | Performed by: PHYSICIAN ASSISTANT

## 2023-09-23 PROCEDURE — 96366 THER/PROPH/DIAG IV INF ADDON: CPT

## 2023-09-23 PROCEDURE — 25000003 PHARM REV CODE 250: Mod: HCNC | Performed by: PHYSICIAN ASSISTANT

## 2023-09-23 PROCEDURE — 80048 BASIC METABOLIC PNL TOTAL CA: CPT | Mod: HCNC | Performed by: PHYSICIAN ASSISTANT

## 2023-09-23 PROCEDURE — 63600175 PHARM REV CODE 636 W HCPCS: Mod: HCNC | Performed by: PHYSICIAN ASSISTANT

## 2023-09-23 PROCEDURE — 36415 COLL VENOUS BLD VENIPUNCTURE: CPT | Mod: HCNC | Performed by: PHYSICIAN ASSISTANT

## 2023-09-23 PROCEDURE — G0378 HOSPITAL OBSERVATION PER HR: HCPCS | Mod: HCNC

## 2023-09-23 PROCEDURE — 99233 PR SUBSEQUENT HOSPITAL CARE,LEVL III: ICD-10-PCS | Mod: HCNC,,, | Performed by: INTERNAL MEDICINE

## 2023-09-23 PROCEDURE — 99233 SBSQ HOSP IP/OBS HIGH 50: CPT | Mod: HCNC,,, | Performed by: INTERNAL MEDICINE

## 2023-09-23 RX ORDER — NALOXONE HCL 0.4 MG/ML
0.02 VIAL (ML) INJECTION
Status: DISCONTINUED | OUTPATIENT
Start: 2023-09-23 | End: 2023-09-25 | Stop reason: HOSPADM

## 2023-09-23 RX ORDER — TALC
6 POWDER (GRAM) TOPICAL NIGHTLY PRN
Status: DISCONTINUED | OUTPATIENT
Start: 2023-09-23 | End: 2023-09-25 | Stop reason: HOSPADM

## 2023-09-23 RX ORDER — METOPROLOL SUCCINATE 100 MG/1
100 TABLET, EXTENDED RELEASE ORAL DAILY
Status: DISCONTINUED | OUTPATIENT
Start: 2023-09-23 | End: 2023-09-25 | Stop reason: HOSPADM

## 2023-09-23 RX ORDER — ACETAMINOPHEN 500 MG
1000 TABLET ORAL EVERY 8 HOURS PRN
Status: DISCONTINUED | OUTPATIENT
Start: 2023-09-23 | End: 2023-09-25 | Stop reason: HOSPADM

## 2023-09-23 RX ORDER — MAG HYDROX/ALUMINUM HYD/SIMETH 200-200-20
30 SUSPENSION, ORAL (FINAL DOSE FORM) ORAL 4 TIMES DAILY PRN
Status: DISCONTINUED | OUTPATIENT
Start: 2023-09-23 | End: 2023-09-25 | Stop reason: HOSPADM

## 2023-09-23 RX ORDER — DULOXETIN HYDROCHLORIDE 60 MG/1
60 CAPSULE, DELAYED RELEASE ORAL EVERY MORNING
Status: DISCONTINUED | OUTPATIENT
Start: 2023-09-23 | End: 2023-09-25 | Stop reason: HOSPADM

## 2023-09-23 RX ORDER — PROCHLORPERAZINE EDISYLATE 5 MG/ML
5 INJECTION INTRAMUSCULAR; INTRAVENOUS EVERY 6 HOURS PRN
Status: DISCONTINUED | OUTPATIENT
Start: 2023-09-23 | End: 2023-09-25 | Stop reason: HOSPADM

## 2023-09-23 RX ORDER — ONDANSETRON 8 MG/1
8 TABLET, ORALLY DISINTEGRATING ORAL EVERY 8 HOURS PRN
Status: DISCONTINUED | OUTPATIENT
Start: 2023-09-23 | End: 2023-09-25 | Stop reason: HOSPADM

## 2023-09-23 RX ORDER — SODIUM CHLORIDE 0.9 % (FLUSH) 0.9 %
5 SYRINGE (ML) INJECTION
Status: DISCONTINUED | OUTPATIENT
Start: 2023-09-23 | End: 2023-09-25 | Stop reason: HOSPADM

## 2023-09-23 RX ORDER — ACETAMINOPHEN 325 MG/1
650 TABLET ORAL EVERY 4 HOURS PRN
Status: DISCONTINUED | OUTPATIENT
Start: 2023-09-23 | End: 2023-09-25 | Stop reason: HOSPADM

## 2023-09-23 RX ORDER — BISACODYL 10 MG
10 SUPPOSITORY, RECTAL RECTAL DAILY PRN
Status: DISCONTINUED | OUTPATIENT
Start: 2023-09-23 | End: 2023-09-25 | Stop reason: HOSPADM

## 2023-09-23 RX ORDER — LAMOTRIGINE 100 MG/1
200 TABLET ORAL 2 TIMES DAILY
Status: DISCONTINUED | OUTPATIENT
Start: 2023-09-23 | End: 2023-09-25 | Stop reason: HOSPADM

## 2023-09-23 RX ORDER — DULOXETIN HYDROCHLORIDE 30 MG/1
30 CAPSULE, DELAYED RELEASE ORAL NIGHTLY
Status: DISCONTINUED | OUTPATIENT
Start: 2023-09-23 | End: 2023-09-25 | Stop reason: HOSPADM

## 2023-09-23 RX ADMIN — ACETAMINOPHEN 1000 MG: 500 TABLET ORAL at 10:09

## 2023-09-23 RX ADMIN — LAMOTRIGINE 200 MG: 100 TABLET ORAL at 08:09

## 2023-09-23 RX ADMIN — CEFTRIAXONE 1 G: 1 INJECTION, POWDER, FOR SOLUTION INTRAMUSCULAR; INTRAVENOUS at 08:09

## 2023-09-23 RX ADMIN — DULOXETINE HYDROCHLORIDE 30 MG: 30 CAPSULE, DELAYED RELEASE ORAL at 08:09

## 2023-09-23 RX ADMIN — DULOXETINE HYDROCHLORIDE 60 MG: 60 CAPSULE, DELAYED RELEASE ORAL at 06:09

## 2023-09-23 RX ADMIN — ACETAMINOPHEN 1000 MG: 500 TABLET ORAL at 08:09

## 2023-09-23 RX ADMIN — ARIPIPRAZOLE 15 MG: 10 TABLET ORAL at 09:09

## 2023-09-23 RX ADMIN — Medication 6 MG: at 01:09

## 2023-09-23 RX ADMIN — METOPROLOL SUCCINATE 100 MG: 100 TABLET, EXTENDED RELEASE ORAL at 09:09

## 2023-09-23 RX ADMIN — DULOXETINE HYDROCHLORIDE 30 MG: 30 CAPSULE, DELAYED RELEASE ORAL at 01:09

## 2023-09-23 RX ADMIN — Medication 6 MG: at 08:09

## 2023-09-23 RX ADMIN — LAMOTRIGINE 200 MG: 100 TABLET ORAL at 09:09

## 2023-09-23 NOTE — SUBJECTIVE & OBJECTIVE
Interval History: NAEON. Pt is more calm , denies any complaints except for hallucination     Review of Systems   Constitutional:  Negative for appetite change.   Respiratory:  Negative for cough and shortness of breath.    Cardiovascular:  Negative for chest pain and leg swelling.   Gastrointestinal:  Negative for abdominal distention, abdominal pain, constipation and diarrhea.   Genitourinary:  Negative for difficulty urinating and dysuria.   Neurological:  Negative for dizziness and headaches.   Psychiatric/Behavioral:  Positive for hallucinations.      Objective:     Vital Signs (Most Recent):  Temp: 98 °F (36.7 °C) (09/23/23 1123)  Pulse: 73 (09/23/23 1123)  Resp: 16 (09/23/23 1123)  BP: 135/60 (09/23/23 1123)  SpO2: (!) 93 % (09/23/23 1123) Vital Signs (24h Range):  Temp:  [98 °F (36.7 °C)-98.3 °F (36.8 °C)] 98 °F (36.7 °C)  Pulse:  [70-96] 73  Resp:  [16] 16  SpO2:  [93 %-100 %] 93 %  BP: (107-149)/(60-65) 135/60     Weight: 77.1 kg (170 lb)  Body mass index is 26.63 kg/m².  No intake or output data in the 24 hours ending 09/23/23 1457      Physical Exam  Constitutional:       Appearance: Normal appearance.   HENT:      Head: Normocephalic and atraumatic.      Mouth/Throat:      Mouth: Mucous membranes are moist.   Cardiovascular:      Rate and Rhythm: Normal rate and regular rhythm.      Pulses: Normal pulses.      Heart sounds: Normal heart sounds.   Pulmonary:      Effort: Pulmonary effort is normal.      Breath sounds: Normal breath sounds. No rales.   Abdominal:      General: Abdomen is flat. Bowel sounds are normal. There is no distension.      Palpations: Abdomen is soft.      Tenderness: There is no abdominal tenderness.   Musculoskeletal:         General: Normal range of motion.      Cervical back: Normal range of motion and neck supple.   Skin:     General: Skin is warm and dry.   Neurological:      General: No focal deficit present.      Mental Status: She is alert and oriented to person, place,  and time.   Psychiatric:         Mood and Affect: Mood normal.             Significant Labs: All pertinent labs within the past 24 hours have been reviewed.    Significant Imaging: I have reviewed all pertinent imaging results/findings within the past 24 hours.

## 2023-09-23 NOTE — ASSESSMENT & PLAN NOTE
Patient with acute kidney injury/acute renal failure likely due to acute tubular necrosis caused by dehydration / nsaids JESSICA is currently worsening. Baseline creatinine 1.1 - Labs reviewed- Renal function/electrolytes with Estimated Creatinine Clearance: 31.6 mL/min (A) (based on SCr of 1.8 mg/dL (H)). according to latest data. Monitor urine output and serial BMP and adjust therapy as needed. Avoid nephrotoxins and renally dose meds for GFR listed above.

## 2023-09-23 NOTE — PROVIDER PROGRESS NOTES - EMERGENCY DEPT.
Patient was signed out to me by Dr. Schaeffer pending imaging. Briefly, this is a 69yo F on PEC for GD hx falls.    Data:  Results for orders placed or performed during the hospital encounter of 09/22/23   CBC auto differential   Result Value Ref Range    WBC 10.09 3.90 - 12.70 K/uL    RBC 3.62 (L) 4.00 - 5.40 M/uL    Hemoglobin 9.2 (L) 12.0 - 16.0 g/dL    Hematocrit 31.3 (L) 37.0 - 48.5 %    MCV 87 82 - 98 fL    MCH 25.4 (L) 27.0 - 31.0 pg    MCHC 29.4 (L) 32.0 - 36.0 g/dL    RDW 28.6 (H) 11.5 - 14.5 %    Platelets 202 150 - 450 K/uL    MPV 10.4 9.2 - 12.9 fL    Immature Granulocytes 0.4 0.0 - 0.5 %    Gran # (ANC) 8.0 (H) 1.8 - 7.7 K/uL    Immature Grans (Abs) 0.04 0.00 - 0.04 K/uL    Lymph # 1.0 1.0 - 4.8 K/uL    Mono # 0.9 0.3 - 1.0 K/uL    Eos # 0.2 0.0 - 0.5 K/uL    Baso # 0.04 0.00 - 0.20 K/uL    nRBC 0 0 /100 WBC    Gran % 78.9 (H) 38.0 - 73.0 %    Lymph % 10.0 (L) 18.0 - 48.0 %    Mono % 8.5 4.0 - 15.0 %    Eosinophil % 1.8 0.0 - 8.0 %    Basophil % 0.4 0.0 - 1.9 %    Differential Method Automated    Comprehensive metabolic panel   Result Value Ref Range    Sodium 138 136 - 145 mmol/L    Potassium 3.6 3.5 - 5.1 mmol/L    Chloride 108 95 - 110 mmol/L    CO2 20 (L) 23 - 29 mmol/L    Glucose 100 70 - 110 mg/dL    BUN 33 (H) 8 - 23 mg/dL    Creatinine 1.8 (H) 0.5 - 1.4 mg/dL    Calcium 9.8 8.7 - 10.5 mg/dL    Total Protein 6.7 6.0 - 8.4 g/dL    Albumin 3.9 3.5 - 5.2 g/dL    Total Bilirubin 0.6 0.1 - 1.0 mg/dL    Alkaline Phosphatase 106 55 - 135 U/L    AST 26 10 - 40 U/L    ALT 18 10 - 44 U/L    eGFR 30.1 (A) >60 mL/min/1.73 m^2    Anion Gap 10 8 - 16 mmol/L   TSH   Result Value Ref Range    TSH 2.927 0.400 - 4.000 uIU/mL   Urinalysis, Reflex to Urine Culture Urine, Clean Catch    Specimen: Urine   Result Value Ref Range    Specimen UA Urine, Clean Catch     Color, UA Yellow Yellow, Straw, Myrtle    Appearance, UA Clear Clear    pH, UA 6.0 5.0 - 8.0    Specific Gravity, UA 1.030 1.005 - 1.030    Protein, UA Trace  (A) Negative    Glucose, UA Negative Negative    Ketones, UA Negative Negative    Bilirubin (UA) Negative Negative    Occult Blood UA Negative Negative    Nitrite, UA Negative Negative    Leukocytes, UA 2+ (A) Negative   Drug screen panel, emergency   Result Value Ref Range    Benzodiazepines Presumptive Positive (A) Negative    Methadone metabolites Negative Negative    Cocaine (Metab.) Negative Negative    Opiate Scrn, Ur Negative Negative    Barbiturate Screen, Ur Negative Negative    Amphetamine Screen, Ur Negative Negative    THC Negative Negative    Phencyclidine Negative Negative    Creatinine, Urine 232.0 15.0 - 325.0 mg/dL    Toxicology Information SEE COMMENT    Ethanol   Result Value Ref Range    Alcohol, Serum <10 <10 mg/dL   Acetaminophen level   Result Value Ref Range    Acetaminophen (Tylenol), Serum <3.0 (L) 10.0 - 20.0 ug/mL   Salicylate level   Result Value Ref Range    Salicylate Lvl <5.0 (L) 15.0 - 30.0 mg/dL   Urinalysis Microscopic   Result Value Ref Range    RBC, UA 3 0 - 4 /hpf    WBC, UA 13 (H) 0 - 5 /hpf    WBC Clumps, UA Occasional (A) None-Rare    Bacteria Occasional None-Occ /hpf    Squam Epithel, UA 8 /hpf    Hyaline Casts, UA 50 (A) 0-1/lpf /lpf    Microscopic Comment SEE COMMENT       Imaging Results              X-Ray Wrist Complete Right (In process)                      X-Ray Forearm Right (In process)                       CT Chest Abdomen Pelvis With Contrast (xpd) (Final result)  Result time 09/22/23 22:26:23      Final result by Joseph Ga MD (09/22/23 22:26:23)                   Impression:      1. No acute intra-abdominopelvic abnormality.  2. Spiculated 9 mm solid pulmonary nodule in the left lower lobe, suspicious for malignancy.  Recommend biopsy for definitive evaluation.  (For a solid nodule >8 mm, Fleischner Society 2017 guidelines recommend considering CT, PET/CT or tissue sampling at 3 months. )  3. Compression fractures of T8, T12 and L4, with few mm  retropulsion at the T12 and L4 levels. The thoracic compression fractures appear stable since comparison CT 08/02/2023 and 03/17/2020.  No prior imaging of the lumbar spine for comparison to assess stability.  4. Acute fracture posterior right 8th rib with mild displacement.  No pneumothorax.  5. Cholelithiasis.  6. Additional findings as above.  This report was flagged in Epic as abnormal.    Electronically signed by resident: Chris Arechiga  Date:    09/22/2023  Time:    20:41    Electronically signed by: Joseph Ga MD  Date:    09/22/2023  Time:    22:26               Narrative:    EXAMINATION:  CT CHEST ABDOMEN PELVIS WITH CONTRAST (XPD)    CLINICAL HISTORY:  Polytrauma, blunt;    TECHNIQUE:  Low dose axial images, sagittal and coronal reformations were obtained from the thoracic inlet to the pubic symphysis following the IV administration of 100 mL of Omnipaque 350.  Oral contrast was not administered.    COMPARISON:  CT chest 08/02/2023.  CT thoracic spine 03/17/2020.    FINDINGS:  THORACIC SOFT TISSUES:  No axillary or subpectoral lymphadenopathy. The visualized thyroid gland is unremarkable.    HEART & MEDIASTINUM: Heart is normal size.  No pericardial effusion.  No mediastinal or hilar lymphadenopathy.  Central pulmonary arteries are patent.    PLEURA:  Interval resolution of small bilateral pleural effusions.  No pneumothorax.    LUNGS & AIRWAYS:  Airways are patent.    Bibasilar subsegmental atelectasis.  Stable thickening of the right minor fissure with adjacent platelike atelectasis.  Spiculated 9 x 9 mm solid nodule in the left lower lobe (axial series 6, image 304), suspicious for malignancy, similar in size and appearance compared to recent prior 08/02/2023.  No consolidation.    HEPATOBILIARY:  Scattered hypodensities measuring up to 4.2 cm in the liver with fluid-level attenuation, likely cysts, similar compared to CT chest 08/02/2023.  No biliary ductal dilatation. Cholelithiasis without  cholecystitis.    SPLEEN:  No splenomegaly.    PANCREAS:  No focal masses or ductal dilatation.    ADRENALS:  No adrenal nodules.    KIDNEYS/URETERS: Kidneys are normal in size and location with symmetric enhancement.  No hydronephrosis, stones or solid mass lesions. Ureters are unremarkable.    PELVIC ORGANS/BLADDER:  Bladder is moderately distended without focal wall thickening.  Hysterectomy.  No adnexal masses.    PERITONEUM / RETROPERITONEUM:  No free air. No free fluid.    LYMPH NODES:  No lymphadenopathy.    VESSELS:  Aorta is normal in caliber.  Portal vein is patent.  IVC is unremarkable.    GI TRACT: Esophagus is unremarkable.  Stomach is unremarkable.  Small bowel is normal in caliber without inflammation or obstruction.  Normal appendix.  No colonic distension or wall thickening.    BONES AND ABDOMINAL SOFT TISSUES:  Abdominal soft tissues are unremarkable.  Right hip arthroplasty.  Diffuse osseous demineralization.  Acute fracture posterior right 8th rib with mild displacement.  Stable remote fractures of multiple posterior right ribs.  Compression fractures of T8, T12 and L4, with a few mm retropulsion at the T12 and L4 levels.  The thoracic compression fractures stable since comparison CT 08/02/2023 and 03/17/2020.  Degenerative changes of the spine.  No aggressive osseous lesions.                                        CT Maxillofacial Without Contrast (Final result)  Result time 09/22/23 20:34:15      Final result by Rudy Rod MD (09/22/23 20:34:15)                   Impression:      1. Bilateral acute nasal fractures involving the ala and nasal bridge.  Minimal displacement.  Recommend surveillance.  2. Remote traumatic changes of the mandible.  3. This report was flagged in Epic as abnormal.      Electronically signed by: Rudy Rod  Date:    09/22/2023  Time:    20:34               Narrative:    EXAMINATION:  CT MAXILLOFACIAL WITHOUT CONTRAST    CLINICAL HISTORY:  Facial trauma,  blunt;    TECHNIQUE:  Low dose axial images, sagittal and coronal reformations were obtained through the face.  Contrast was not administered.    COMPARISON:  09/05/2021    FINDINGS:  Acute nasal fractures bilaterally involving the nasal bridge and ala.  Minimal displacement.    Zygomatic arches are intact.  The orbits are intact.  No acute fractures of the mandible.  Probable remote trauma of the mandible.    Paranasal sinuses appear intact.  No significant sinus disease mastoid air cell disease.    The globes are within normal limits.    No soft tissue mass or fluid collection is detected.                                       CT Head Without Contrast (Final result)  Result time 09/22/23 20:28:49      Final result by Rudy Rod MD (09/22/23 20:28:49)                   Impression:      No acute intracranial process.      Electronically signed by: Rudy Rod  Date:    09/22/2023  Time:    20:28               Narrative:    EXAMINATION:  CT HEAD WITHOUT CONTRAST    CLINICAL HISTORY:  Facial trauma, blunt;    TECHNIQUE:  Low dose axial CT images obtained throughout the head without intravenous contrast. Sagittal and coronal reconstructions were performed.    COMPARISON:  08/01/2023    FINDINGS:  Intracranial compartment:    Ventricles and sulci are normal in size for age without evidence of hydrocephalus. No extra-axial blood or fluid collections.    Moderate involutional changes.  Mild probable chronic microvascular ischemic changes in the periventricular white matter.  No parenchymal mass, hemorrhage, edema or major vascular distribution infarct.    Skull/extracranial contents (limited evaluation): No fracture. Mastoid air cells and paranasal sinuses are essentially clear.  Scalp contusion anteriorly.                                       CT Cervical Spine Without Contrast (Final result)  Result time 09/22/23 20:40:41      Final result by Rudy Rod MD (09/22/23 20:40:41)                   Impression:       1. No acute abnormality.  2. Multilevel chronic degenerative changes.      Electronically signed by: Rudy Rod  Date:    09/22/2023  Time:    20:40               Narrative:    EXAMINATION:  CT CERVICAL SPINE WITHOUT CONTRAST    CLINICAL HISTORY:  Neck trauma (Age >= 65y);    TECHNIQUE:  Low dose axial CT images through the cervical spine, with sagittal and coronal reformations.  Contrast was not administered.    COMPARISON:  None    FINDINGS:  No acute fractures of the cervical spine.  Alignment is satisfactory.    Mild to moderate disc space narrowing throughout the cervical spine.  Osseous demineralization.  Posterior elements are intact.  Multilevel minimal posterior disc osteophyte complex.    No significant central canal narrowing.  Mild multilevel foraminal narrowing.    Limited evaluation of the intraspinal contents demonstrates no hematoma or mass.Paraspinal soft tissues exhibit no acute abnormalities.                                       X-Ray Knee 3 View Bilateral (Final result)  Result time 09/22/23 20:43:22      Final result by Rudy Rod MD (09/22/23 20:43:22)                   Impression:      No acute radiographic abnormality bilaterally.      Electronically signed by: Rudy Rod  Date:    09/22/2023  Time:    20:43               Narrative:    EXAMINATION:  XR KNEE 3 VIEW BILATERAL    CLINICAL HISTORY:  Injury, unspecified, initial encounter    TECHNIQUE:  AP, lateral, and Merchant views of both knees were performed.    COMPARISON:  None    FINDINGS:  No acute fracture, subluxation or dislocation of the knees bilaterally.  Surgical fixation hardware noted in the distal right femur.  No significant joint effusion bilaterally.  No osseous destruction.  Joint spaces appear adequately maintained.                                       X-Ray Shoulder Trauma Bilateral (Final result)  Result time 09/22/23 20:43:38      Final result by Juan Melgar DO (09/22/23 20:43:38)                    Impression:      No acute fracture or dislocation of the bilateral shoulders.    Acute fractures of the right 7th and 8th ribs.      Electronically signed by: Juan Melgar  Date:    09/22/2023  Time:    20:43               Narrative:    EXAMINATION:  XR SHOULDER TRAUMA 3 VIEW BILATERAL    CLINICAL HISTORY:  Injury, unspecified, initial encounter    TECHNIQUE:  Three views of each shoulder were performed.    COMPARISON:  07/11/2017.    FINDINGS:  There is osteopenia.    Right shoulder: There is no acute fracture or dislocation.  Alignment is normal.  The humeral head is well seated in the glenoid.  There is mild joint space narrowing of the acromioclavicular and glenohumeral joints.    Left shoulder: There is no acute fracture or dislocation. Alignment is normal. The humeral head is well seated in the glenoid. There is mild joint space narrowing of the acromioclavicular and glenohumeral joints.    There are mildly displaced acute fractures of the right 7th and 8th ribs, posterolateral aspects.  There are several remote left-sided rib fractures.                                       X-Ray Forearm Right (Final result)  Result time 09/22/23 20:40:47      Final result by Juan Melgar DO (09/22/23 20:40:47)                   Impression:      Posterior positioning of the distal ulna with respect to the radius, which may be due to patient positioning. Ulnar dislocation not excluded.  Correlate with point tenderness.    No acute fracture.      Electronically signed by: Juan Melgar  Date:    09/22/2023  Time:    20:40               Narrative:    EXAMINATION:  XR FOREARM RIGHT    CLINICAL HISTORY:  Injury, unspecified, initial encounter    TECHNIQUE:  AP and lateral views of the right forearm were performed.    COMPARISON:  None    FINDINGS:  There is osteopenia.  There is no acute fracture.  There is a posterior positioning of the distal ulna with respect to the radius, which may be due to patient positioning.   Ulnar dislocation not excluded.                                       MDM:   68-year-old female signed out pending trauma imaging.  CT chest abdomen pelvis concerning for acute rib fracture.  She also has T8, T12, and L4 compression fractures.  She does have an JESSICA and mild UTI.  She also has a new lung mass concerning for possible neoplasm.  There are no med psych beds at [facility].  Plan on admission to hospital medicine on pec for further management.

## 2023-09-23 NOTE — ED TRIAGE NOTES
Patient presents to the ED today with reports of hallucinations. Patient states seeing bugs everywhere and people who are not there. Patient also endorses auditory hallucinations. Patient denies HI/SI. Patient states compliant with medication. Patient unkept.

## 2023-09-23 NOTE — CONSULTS
"CONSULTATION LIAISON PSYCHIATRY INITIAL EVALUATION    Patient Name: Elly Harris  MRN: [id number]  Patient Class: OP- Observation  Admission Date: 9/22/2023  Attending Physician: Juliocesar Cutler MD    HPI:   Elly Harris is a 68 y.o. female with past psychiatric history of Unspecified Bipolar Disorder & past pertinent medical history of osteoporosis, KEVIN, multiple prior falls presents to the ED/admitted to the hospital for Acute kidney injury. Initially presented for hallucinations of +VH of bugs crawling on body & in home, seeing people; pt notably unkempt w/ fecal matter on clothing in ED. Notable work-ups in ED include: spiculated 9mm solid pulm nodule in LLL suspicious for malignancy, compression fractures at T8/T12/L4 w/ acute fx @ 8th rib (R posterior), bilateral acute nasal fx w/ scalp contusion, UDS+ benzo (per , last 30 day supply of Ativan 0.5mg daily filled on 9/1/23)    Psychiatry consulted for "PECd, grave disability" (signed 9/22 @1813hrs)    Pt seen at bedside upon interview. Pt was pleasant, linear, and calm throughout interview. Pt also is a great historian able to recall details about HPI. Pt stated that over the past month she has been having +VH of seeing "animals on bookshelves" or "mother's & puppies" in her peripheral vision. Pt stated having +VH after running out of Vraylar (what pt believes caused her +VH) of unknown dose that she was taking ~1wk for better depression management; however pt concurrently filled her Ativan 0.5mg daily prescription for 30 days on 9/1/23 for anxiety w/ a previous refill on 7/17/23 at same dose & days per . Since the beginning of this month, pt stated taking 2-3x Ativans per day (implying previous non-daily use of monthly prescriptions that allowed more than rx'd use on tail-end). Over the past wk, pt stated that she fell 5x (twice face-first). Denied concurrent alcohol use. Pt also stated that she has had +AH for ~1 year now of music-specifically "Xmas " "Carols" & "60s' Rock-&-Roll." Pt denied SI/HI. Pt stated having serena for the last few days and last wk; unclear what pt means by serena at this time. Pt's outpatient psychiatrist is Dr Farzana Ta, who treats her for bipolar disorder (unclear what type due to limited outside chart review). Pt's outpatient psych meds are Cymbalta 60mg/30mg BID, Abilify 15mg daily, Lamictal 200mg BID, and Ativan 0.5mg daily PRN-which she has been compliant to all aforementioned medications.     Collateral with patient's permission:   Tamica Melendez (Daughter)   [phone] ([place])    Medical Review of Systems:  Pertinent items are noted in HPI.    Obtained via chart review & updated where appropriate    Psychiatric Review of Systems (is patient experiencing or having changes in):  sleep: no  appetite: no  weight: yes, increasing  energy/anergy: yes  interest/pleasure/anhedonia: no  somatic symptoms: no  libido: no  anxiety/panic: yes, anxiety w/ panic attacks  guilty/hopelessness: no  concentration: yes  Serena: pt stated yes this consult but previously denied last month when [facility] evaluated this pt  Psychosis: no  Trauma: yes  S.I.B.s/risky behavior: no    Past Psychiatric History:  Previous Medication Trials: unclear  Previous Psychiatric Hospitalizations:no   Previous Suicide Attempts: no  History of Violence: no  Outpatient Psychiatrist: yes  Family Psychiatric History: yes, daughter with bipolar I, brother with depression  Family History: Mother with Alzheimer's     Substance Use History (w/ emphasis on past 12 months):  Recreational Drugs:  no  Use of Alcohol:  annual drink  Tobacco Use: no; previously, quit in 1979  Rehab History:no     Social History:  Marital Status:   Children: 2  Employment Status/Info: retired, 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  Education:  Associates degree, nursing  Special Ed: no  Housing Status: home with brother and daughter  Access to gun: no  Psychosocial " "Stressors: family; daughter telling patient she is "kooky" for having hallucinations, patient thinks the daughter is "glad" to have a break from her  Functioning Relationships: good relationship with children, poor support system, parents and grandparents have passed, financially responsible for brother and daughter     Legal History:  Past Charges/Incarcerations: no  Pending charges: no    Mental Status Exam:  General Appearance: appears stated age, well developed and nourished, adequately groomed and appropriately dressed, in no acute distress  Behavior: normal; cooperative; reasonably friendly, pleasant, and polite; appropriate eye-contact; under good behavioral control  Involuntary Movements and Motor Activity: no abnormal involuntary movements noted; no tics, no tremors, no akathisia, no dystonia, no evidence of tardive dyskinesia; no psychomotor agitation or retardation  Gait and Station: unable to assess - patient lying down or seated  Speech and Language: intact; normal rate, rhythm, volume, tone, and pitch; conversational, spontaneous, and coherent; speaks and understands English proficiently and fluently; repeats words and phrases, no word finding difficulties are noted  Mood: "okay"  Affect: reactive, appropriate to situation and context, full-range  Thought Process and Associations: intact; linear, goal-directed, organized, and logical; no loosening of associations noted  Thought Content and Perceptions:: no suicidal ideation, no homicidal ideation, + auditory hallucinations, + visual hallucinations, no delusions  Sensorium and Orientation: intact; alert with clear sensorium; oriented fully to person, place, time and situation  Recent and Remote Memory: grossly intact, able to recall relevant and salient information from the recent and remote past  Attention and Concentration: grossly intact, attentive to the conversation and not readily distractible  Fund of Knowledge: grossly intact, used appropriate " vocabulary and demonstrated an awareness of current events, consistent with educational level achieved  Insight: intact, demonstrates awareness of illness and situation, expresses insight into oddities of her +AVH and aware they are not real  Judgment: intact, behavior is adequate/appropriate to the circumstances, compliant with health provider's recommendations and instructions    CAM ICU positive? no    ASSESSMENT & RECOMMENDATIONS   Unspecified Bipolar Disorder  Benzodiazepine use disorder    Continue/resume home meds:  Duloxetine 60 mg/30 mg AM/PM  Abilify 15 mg daily  Lamotrigine 200 mg BID  UDS+ benzo. Prescribed Ativan 0.5mg daily PRN for anxiety. Since the beginning of this month, pt stated taking 2-3x Ativans per day, implying previous non-daily use of monthly prescriptions that allowed more than rx'd use on tail-end.  Provide education/encouragement to wean-off benzo use.  PRN Zyprexa 5mg PO/IM for verbally non-redirectable agitation.  9/22/23 EKG nsr w/ QTc 421ms    DELIRIUM  DELIRIUM BEHAVIOR MANAGEMENT  PLEASE utilize CHEMICAL restraints with PRN meds first for agitation. Minimize use of PHYSICAL restraints OR have periods of being out of physical restraints if possible.  Keep window shades open and room lit during day and room dim at night in order to promote normal sleep-wake cycles  Encourage family at bedside. [place] patient often to situation, location, date.  Continue to Limit or Discontinue use of Narcotics, Benzos and Anti-cholinergic medications as they may worsen delirium.  Continue medical workup for causative etiology of Delirium.     OTHER PERTINENT DIAGNOSIS    RISK ASSESSMENT  NEEDS PEC because patient is in imminent danger of hurting self or others and is gravely disabled. & NEEDS 1:1 sitter    FOLLOW UP  Will follow up while in house    DISPOSITION - once medically cleared:   Defer to medical team    Please contact ON CALL psychiatry service (24/7) for any acute issues that may  arise.    Dr. Lucas Gilltete   Psychiatry  [facility]  2023 5:33 AM        --------------------------------------------------------------------------------------------------------------------------------------------------------------------------------------------------------------------------------------    CONTINUED HISTORY & OBJECTIVE clinical data & findings reviewed and noted for above decision making    Past Medical/Surgical History:   Past Medical History:   Diagnosis Date    Anemia     Depression     Fibromyalgia     GERD (gastroesophageal reflux disease)     Herpes zoster infection of thoracic region 2014    left    Hyperlipidemia     Hypertension     Migraine headache     RLS (restless legs syndrome)      Past Surgical History:   Procedure Laterality Date    BILATERAL SALPINGOOPHORECTOMY       SECTION      CLOSED REDUCTION OF FRACTURE OF NASAL BONE Bilateral 2021    Procedure: CLOSED REDUCTION, FRACTURE, NASAL BONE;  Surgeon: JV Connelly MD;  Location: [facility] OR;  Service: ENT;  Laterality: Bilateral;    CLOSED REDUCTION OF FRACTURE OF NASAL BONE Bilateral 2021    Procedure: CLOSED REDUCTION, FRACTURE, NASAL BONE;  Surgeon: JV Connelly MD;  Location: [facility] OR;  Service: ENT;  Laterality: Bilateral;    DEBRIDEMENT TENNIS ELBOW      FEMUR SURGERY  2013    FOOT NEUROMA SURGERY Bilateral     with hammertoe repair    HYSTERECTOMY      vaginal    LASIK      MANDIBLE FRACTURE SURGERY      TONSILLECTOMY      TOTAL VAGINAL HYSTERECTOMY         Current Medications:   Scheduled Meds:    ARIPiprazole  15 mg Oral Daily    cefTRIAXone (ROCEPHIN) IVPB  1 g Intravenous Q24H    DULoxetine  30 mg Oral QHS    DULoxetine  60 mg Oral QAM    lamoTRIgine  200 mg Oral BID    metoprolol succinate  100 mg Oral Daily     PRN Meds: acetaminophen, acetaminophen, aluminum-magnesium hydroxide-simethicone, bisacodyL, melatonin, naloxone, ondansetron, prochlorperazine,  sodium chloride 0.9%, DIPH,PERTUSS(ACELL),TET VACCINE (ADULT)(BOOSTRIX,ADACEL)    Allergies:   Review of patient's allergies indicates:   Allergen Reactions    Doxycycline Rash and Hives       Vitals  Vitals:    09/23/23 1501   BP: (!) 113/57   Pulse: 70   Resp: 16   Temp: 98 °F (36.7 °C)       Labs/Imaging/Studies:  Recent Results (from the past 24 hour(s))   Urinalysis, Reflex to Urine Culture Urine, Clean Catch    Collection Time: 09/22/23  7:22 PM    Specimen: Urine   Result Value Ref Range    Specimen UA Urine, Clean Catch     Color, UA Yellow Yellow, Straw, Myrtle    Appearance, UA Clear Clear    pH, UA 6.0 5.0 - 8.0    Specific Gravity, UA 1.030 1.005 - 1.030    Protein, UA Trace (A) Negative    Glucose, UA Negative Negative    Ketones, UA Negative Negative    Bilirubin (UA) Negative Negative    Occult Blood UA Negative Negative    Nitrite, UA Negative Negative    Leukocytes, UA 2+ (A) Negative   Drug screen panel, emergency    Collection Time: 09/22/23  7:22 PM   Result Value Ref Range    Benzodiazepines Presumptive Positive (A) Negative    Methadone metabolites Negative Negative    Cocaine (Metab.) Negative Negative    Opiate Scrn, Ur Negative Negative    Barbiturate Screen, Ur Negative Negative    Amphetamine Screen, Ur Negative Negative    THC Negative Negative    Phencyclidine Negative Negative    Creatinine, Urine 232.0 15.0 - 325.0 mg/dL    Toxicology Information SEE COMMENT    Urinalysis Microscopic    Collection Time: 09/22/23  7:22 PM   Result Value Ref Range    RBC, UA 3 0 - 4 /hpf    WBC, UA 13 (H) 0 - 5 /hpf    WBC Clumps, UA Occasional (A) None-Rare    Bacteria Occasional None-Occ /hpf    Squam Epithel, UA 8 /hpf    Hyaline Casts, UA 50 (A) 0-1/lpf /lpf    Microscopic Comment SEE COMMENT    Basic Metabolic Panel (BMP)    Collection Time: 09/23/23  3:13 AM   Result Value Ref Range    Sodium 138 136 - 145 mmol/L    Potassium 3.4 (L) 3.5 - 5.1 mmol/L    Chloride 107 95 - 110 mmol/L    CO2 24 23 - 29  mmol/L    Glucose 87 70 - 110 mg/dL    BUN 26 (H) 8 - 23 mg/dL    Creatinine 1.5 (H) 0.5 - 1.4 mg/dL    Calcium 9.0 8.7 - 10.5 mg/dL    Anion Gap 7 (L) 8 - 16 mmol/L    eGFR 37.5 (A) >60 mL/min/1.73 m^2   CBC with Automated Differential    Collection Time: 09/23/23  3:13 AM   Result Value Ref Range    WBC 5.36 3.90 - 12.70 K/uL    RBC 3.00 (L) 4.00 - 5.40 M/uL    Hemoglobin 7.6 (L) 12.0 - 16.0 g/dL    Hematocrit 26.5 (L) 37.0 - 48.5 %    MCV 88 82 - 98 fL    MCH 25.3 (L) 27.0 - 31.0 pg    MCHC 28.7 (L) 32.0 - 36.0 g/dL    RDW SEE COMMENT 11.5 - 14.5 %    Platelets 149 (L) 150 - 450 K/uL    MPV 10.9 9.2 - 12.9 fL    Immature Granulocytes 0.2 0.0 - 0.5 %    Gran # (ANC) 3.3 1.8 - 7.7 K/uL    Immature Grans (Abs) 0.01 0.00 - 0.04 K/uL    Lymph # 1.2 1.0 - 4.8 K/uL    Mono # 0.6 0.3 - 1.0 K/uL    Eos # 0.2 0.0 - 0.5 K/uL    Baso # 0.03 0.00 - 0.20 K/uL    nRBC 0 0 /100 WBC    Gran % 62.1 38.0 - 73.0 %    Lymph % 21.6 18.0 - 48.0 %    Mono % 11.0 4.0 - 15.0 %    Eosinophil % 4.5 0.0 - 8.0 %    Basophil % 0.6 0.0 - 1.9 %    Platelet Estimate Decreased (A)     Aniso Moderate     Poik Slight     Poly Occasional     Hypo Occasional     Ovalocytes Occasional     Basophilic Stippling Occasional     Large/Giant Platelets Present     Differential Method Automated      Imaging Results              X-Ray Wrist Complete Right (Final result)  Result time 09/22/23 23:52:51      Final result by Rudy Rod MD (09/22/23 23:52:51)                   Impression:      No acute radiographic abnormality.      Electronically signed by: Rudy Rod  Date:    09/22/2023  Time:    23:52               Narrative:    EXAMINATION:  XR WRIST COMPLETE 3 VIEWS RIGHT    CLINICAL HISTORY:  Injury, unspecified, initial encounter    TECHNIQUE:  PA, lateral, and oblique views of the right wrist were performed.    COMPARISON:  None    FINDINGS:  No acute fracture, subluxation or dislocation.  No mass or foreign body.  Mild degenerative changes.                                        X-Ray Forearm Right (Final result)  Result time 09/22/23 23:51:18      Final result by Rudy Rod MD (09/22/23 23:51:18)                   Impression:      No acute radiographic abnormality.      Electronically signed by: Rudy Rod  Date:    09/22/2023  Time:    23:51               Narrative:    EXAMINATION:  XR FOREARM RIGHT    CLINICAL HISTORY:  Injury, unspecified, initial encounter    TECHNIQUE:  AP and lateral views of the right forearm were performed.    COMPARISON:  09/22/2023    FINDINGS:  No acute fracture, subluxation or dislocation.  No mass or foreign body.    No significant change.                                        CT Chest Abdomen Pelvis With Contrast (xpd) (Final result)  Result time 09/22/23 22:26:23      Final result by Joseph Ga MD (09/22/23 22:26:23)                   Impression:      1. No acute intra-abdominopelvic abnormality.  2. Spiculated 9 mm solid pulmonary nodule in the left lower lobe, suspicious for malignancy.  Recommend biopsy for definitive evaluation.  (For a solid nodule >8 mm, Fleischner Society 2017 guidelines recommend considering CT, PET/CT or tissue sampling at 3 months. )  3. Compression fractures of T8, T12 and L4, with few mm retropulsion at the T12 and L4 levels. The thoracic compression fractures appear stable since comparison CT 08/02/2023 and 03/17/2020.  No prior imaging of the lumbar spine for comparison to assess stability.  4. Acute fracture posterior right 8th rib with mild displacement.  No pneumothorax.  5. Cholelithiasis.  6. Additional findings as above.  This report was flagged in Epic as abnormal.    Electronically signed by resident: Chris Arechiga  Date:    09/22/2023  Time:    20:41    Electronically signed by: Joseph Ga MD  Date:    09/22/2023  Time:    22:26               Narrative:    EXAMINATION:  CT CHEST ABDOMEN PELVIS WITH CONTRAST (XPD)    CLINICAL HISTORY:  Polytrauma,  blunt;    TECHNIQUE:  Low dose axial images, sagittal and coronal reformations were obtained from the thoracic inlet to the pubic symphysis following the IV administration of 100 mL of Omnipaque 350.  Oral contrast was not administered.    COMPARISON:  CT chest 08/02/2023.  CT thoracic spine 03/17/2020.    FINDINGS:  THORACIC SOFT TISSUES:  No axillary or subpectoral lymphadenopathy. The visualized thyroid gland is unremarkable.    HEART & MEDIASTINUM: Heart is normal size.  No pericardial effusion.  No mediastinal or hilar lymphadenopathy.  Central pulmonary arteries are patent.    PLEURA:  Interval resolution of small bilateral pleural effusions.  No pneumothorax.    LUNGS & AIRWAYS:  Airways are patent.    Bibasilar subsegmental atelectasis.  Stable thickening of the right minor fissure with adjacent platelike atelectasis.  Spiculated 9 x 9 mm solid nodule in the left lower lobe (axial series 6, image 304), suspicious for malignancy, similar in size and appearance compared to recent prior 08/02/2023.  No consolidation.    HEPATOBILIARY:  Scattered hypodensities measuring up to 4.2 cm in the liver with fluid-level attenuation, likely cysts, similar compared to CT chest 08/02/2023.  No biliary ductal dilatation. Cholelithiasis without cholecystitis.    SPLEEN:  No splenomegaly.    PANCREAS:  No focal masses or ductal dilatation.    ADRENALS:  No adrenal nodules.    KIDNEYS/URETERS: Kidneys are normal in size and location with symmetric enhancement.  No hydronephrosis, stones or solid mass lesions. Ureters are unremarkable.    PELVIC ORGANS/BLADDER:  Bladder is moderately distended without focal wall thickening.  Hysterectomy.  No adnexal masses.    PERITONEUM / RETROPERITONEUM:  No free air. No free fluid.    LYMPH NODES:  No lymphadenopathy.    VESSELS:  Aorta is normal in caliber.  Portal vein is patent.  IVC is unremarkable.    GI TRACT: Esophagus is unremarkable.  Stomach is unremarkable.  Small bowel is normal  in caliber without inflammation or obstruction.  Normal appendix.  No colonic distension or wall thickening.    BONES AND ABDOMINAL SOFT TISSUES:  Abdominal soft tissues are unremarkable.  Right hip arthroplasty.  Diffuse osseous demineralization.  Acute fracture posterior right 8th rib with mild displacement.  Stable remote fractures of multiple posterior right ribs.  Compression fractures of T8, T12 and L4, with a few mm retropulsion at the T12 and L4 levels.  The thoracic compression fractures stable since comparison CT 08/02/2023 and 03/17/2020.  Degenerative changes of the spine.  No aggressive osseous lesions.                                        CT Maxillofacial Without Contrast (Final result)  Result time 09/22/23 20:34:15      Final result by Rudy Rod MD (09/22/23 20:34:15)                   Impression:      1. Bilateral acute nasal fractures involving the ala and nasal bridge.  Minimal displacement.  Recommend surveillance.  2. Remote traumatic changes of the mandible.  3. This report was flagged in Epic as abnormal.      Electronically signed by: Rudy Rod  Date:    09/22/2023  Time:    20:34               Narrative:    EXAMINATION:  CT MAXILLOFACIAL WITHOUT CONTRAST    CLINICAL HISTORY:  Facial trauma, blunt;    TECHNIQUE:  Low dose axial images, sagittal and coronal reformations were obtained through the face.  Contrast was not administered.    COMPARISON:  09/05/2021    FINDINGS:  Acute nasal fractures bilaterally involving the nasal bridge and ala.  Minimal displacement.    Zygomatic arches are intact.  The orbits are intact.  No acute fractures of the mandible.  Probable remote trauma of the mandible.    Paranasal sinuses appear intact.  No significant sinus disease mastoid air cell disease.    The globes are within normal limits.    No soft tissue mass or fluid collection is detected.                                       CT Head Without Contrast (Final result)  Result time 09/22/23  20:28:49      Final result by Rudy Rod MD (09/22/23 20:28:49)                   Impression:      No acute intracranial process.      Electronically signed by: Rudy Rod  Date:    09/22/2023  Time:    20:28               Narrative:    EXAMINATION:  CT HEAD WITHOUT CONTRAST    CLINICAL HISTORY:  Facial trauma, blunt;    TECHNIQUE:  Low dose axial CT images obtained throughout the head without intravenous contrast. Sagittal and coronal reconstructions were performed.    COMPARISON:  08/01/2023    FINDINGS:  Intracranial compartment:    Ventricles and sulci are normal in size for age without evidence of hydrocephalus. No extra-axial blood or fluid collections.    Moderate involutional changes.  Mild probable chronic microvascular ischemic changes in the periventricular white matter.  No parenchymal mass, hemorrhage, edema or major vascular distribution infarct.    Skull/extracranial contents (limited evaluation): No fracture. Mastoid air cells and paranasal sinuses are essentially clear.  Scalp contusion anteriorly.                                       CT Cervical Spine Without Contrast (Final result)  Result time 09/22/23 20:40:41      Final result by Rudy Rod MD (09/22/23 20:40:41)                   Impression:      1. No acute abnormality.  2. Multilevel chronic degenerative changes.      Electronically signed by: Rudy Rod  Date:    09/22/2023  Time:    20:40               Narrative:    EXAMINATION:  CT CERVICAL SPINE WITHOUT CONTRAST    CLINICAL HISTORY:  Neck trauma (Age >= 65y);    TECHNIQUE:  Low dose axial CT images through the cervical spine, with sagittal and coronal reformations.  Contrast was not administered.    COMPARISON:  None    FINDINGS:  No acute fractures of the cervical spine.  Alignment is satisfactory.    Mild to moderate disc space narrowing throughout the cervical spine.  Osseous demineralization.  Posterior elements are intact.  Multilevel minimal posterior disc  osteophyte complex.    No significant central canal narrowing.  Mild multilevel foraminal narrowing.    Limited evaluation of the intraspinal contents demonstrates no hematoma or mass.Paraspinal soft tissues exhibit no acute abnormalities.                                       X-Ray Knee 3 View Bilateral (Final result)  Result time 09/22/23 20:43:22      Final result by Rudy Rod MD (09/22/23 20:43:22)                   Impression:      No acute radiographic abnormality bilaterally.      Electronically signed by: Rudy Rod  Date:    09/22/2023  Time:    20:43               Narrative:    EXAMINATION:  XR KNEE 3 VIEW BILATERAL    CLINICAL HISTORY:  Injury, unspecified, initial encounter    TECHNIQUE:  AP, lateral, and Merchant views of both knees were performed.    COMPARISON:  None    FINDINGS:  No acute fracture, subluxation or dislocation of the knees bilaterally.  Surgical fixation hardware noted in the distal right femur.  No significant joint effusion bilaterally.  No osseous destruction.  Joint spaces appear adequately maintained.                                       X-Ray Shoulder Trauma Bilateral (Final result)  Result time 09/22/23 20:43:38      Final result by Juan Melgar DO (09/22/23 20:43:38)                   Impression:      No acute fracture or dislocation of the bilateral shoulders.    Acute fractures of the right 7th and 8th ribs.      Electronically signed by: Juan Melgar  Date:    09/22/2023  Time:    20:43               Narrative:    EXAMINATION:  XR SHOULDER TRAUMA 3 VIEW BILATERAL    CLINICAL HISTORY:  Injury, unspecified, initial encounter    TECHNIQUE:  Three views of each shoulder were performed.    COMPARISON:  07/11/2017.    FINDINGS:  There is osteopenia.    Right shoulder: There is no acute fracture or dislocation.  Alignment is normal.  The humeral head is well seated in the glenoid.  There is mild joint space narrowing of the acromioclavicular and glenohumeral  joints.    Left shoulder: There is no acute fracture or dislocation. Alignment is normal. The humeral head is well seated in the glenoid. There is mild joint space narrowing of the acromioclavicular and glenohumeral joints.    There are mildly displaced acute fractures of the right 7th and 8th ribs, posterolateral aspects.  There are several remote left-sided rib fractures.                                       X-Ray Forearm Right (Final result)  Result time 09/22/23 20:40:47      Final result by Juan Melgar DO (09/22/23 20:40:47)                   Impression:      Posterior positioning of the distal ulna with respect to the radius, which may be due to patient positioning. Ulnar dislocation not excluded.  Correlate with point tenderness.    No acute fracture.      Electronically signed by: Juan Melgar  Date:    09/22/2023  Time:    20:40               Narrative:    EXAMINATION:  XR FOREARM RIGHT    CLINICAL HISTORY:  Injury, unspecified, initial encounter    TECHNIQUE:  AP and lateral views of the right forearm were performed.    COMPARISON:  None    FINDINGS:  There is osteopenia.  There is no acute fracture.  There is a posterior positioning of the distal ulna with respect to the radius, which may be due to patient positioning.  Ulnar dislocation not excluded.

## 2023-09-23 NOTE — ED NOTES
Assumed care of patient at this time. Patient remains in paper scrubs resting on stretcher, with 1:1 observation. Patient is calm and cooperative.

## 2023-09-23 NOTE — SUBJECTIVE & OBJECTIVE
Past Medical History:   Diagnosis Date    Anemia     Depression     Fibromyalgia     GERD (gastroesophageal reflux disease)     Herpes zoster infection of thoracic region 2014    left    Hyperlipidemia     Hypertension     Migraine headache     RLS (restless legs syndrome)        Past Surgical History:   Procedure Laterality Date    BILATERAL SALPINGOOPHORECTOMY       SECTION      CLOSED REDUCTION OF FRACTURE OF NASAL BONE Bilateral 2021    Procedure: CLOSED REDUCTION, FRACTURE, NASAL BONE;  Surgeon: JV Connelly MD;  Location: [facility] OR;  Service: ENT;  Laterality: Bilateral;    CLOSED REDUCTION OF FRACTURE OF NASAL BONE Bilateral 2021    Procedure: CLOSED REDUCTION, FRACTURE, NASAL BONE;  Surgeon: JV Connelly MD;  Location: [facility] OR;  Service: ENT;  Laterality: Bilateral;    DEBRIDEMENT TENNIS ELBOW      FEMUR SURGERY  2013    FOOT NEUROMA SURGERY Bilateral     with hammertoe repair    HYSTERECTOMY      vaginal    LASIK      MANDIBLE FRACTURE SURGERY      TONSILLECTOMY      TOTAL VAGINAL HYSTERECTOMY         Review of patient's allergies indicates:   Allergen Reactions    Doxycycline Rash and Hives       No current facility-administered medications on file prior to encounter.     Current Outpatient Medications on File Prior to Encounter   Medication Sig    acetaminophen (TYLENOL) 500 MG tablet Take 1,000 mg by mouth 2 (two) times daily as needed for Pain.    ARIPiprazole (ABILIFY) 15 MG Tab Take 15 mg by mouth once daily.    atorvastatin (LIPITOR) 20 MG tablet Take 1 tablet by mouth once daily.    blood-gluc,BP meter,adult cuff Shira 1 each by Misc.(Non-Drug; Combo Route) route every morning.    buPROPion (WELLBUTRIN XL) 150 MG TB24 tablet Take 150 mg by mouth once daily.    cyanocobalamin (VITAMIN B-12) 250 MCG tablet Take 1 tablet (250 mcg total) by mouth once daily.    cyclobenzaprine (FLEXERIL) 10 MG tablet Take 1 tablet (10 mg total) by mouth 3 (three) times daily as  needed. (Patient taking differently: Take 10 mg by mouth 3 (three) times daily as needed for Muscle spasms.)    DULoxetine (CYMBALTA) 30 MG capsule Take 30 mg by mouth every evening. (60MG QAM & 30MG QPM)    DULoxetine (CYMBALTA) 60 MG capsule Take 1 capsule (60 mg total) by mouth once daily. (Patient taking differently: Take 60 mg by mouth every morning. (60MG QAM & 30MG QPM))    esomeprazole (NEXIUM) 40 MG capsule Take 1 capsule (40 mg total) by mouth once daily.    ferrous sulfate 325 (65 FE) MG EC tablet Take 1 tablet (325 mg total) by mouth once daily.    FLUoxetine 20 MG capsule Take 20 mg by mouth once daily.    gabapentin (NEURONTIN) 100 MG capsule Take 1 capsule (100 mg total) by mouth 2 (two) times daily.    lamoTRIgine (LAMICTAL) 200 MG tablet Take 200 mg by mouth 2 (two) times daily.    lorazepam (ATIVAN) 0.5 MG tablet Take 1 tablet (0.5 mg total) by mouth daily as needed. (Patient taking differently: Take 0.5 mg by mouth daily as needed for Anxiety.)    losartan-hydrochlorothiazide 100-25 mg (HYZAAR) 100-25 mg per tablet Take 1 tablet by mouth once daily.    melatonin (MELATIN) 5 mg Take 10 mg by mouth nightly as needed (sleep).    metoprolol succinate (TOPROL-XL) 100 MG 24 hr tablet Take 1 tablet by mouth daily.    naproxen (NAPROSYN) 500 MG tablet Take 500 mg by mouth 2 (two) times daily with meals.    OLANZapine (ZYPREXA) 5 MG tablet Take 5 mg by mouth once daily.    polyethylene glycol (GLYCOLAX) 17 gram PwPk Take 17 g by mouth 2 (two) times daily as needed (constipation caused by iron supplement).    sumatriptan (IMITREX) 100 MG tablet TAKE ONE TABLET BY MOUTH AT ONSET, THEN ONE TABLET 2 HOURS LATER FOR EACH EPISODE    triamcinolone acetonide 0.1% (KENALOG) 0.1 % cream      Family History       Problem Relation (Age of Onset)    Alzheimer's disease Mother    Asbestos Father    COPD Brother    Heart disease Brother    Hyperlipidemia Mother    Hypertension Father    Lymphoma Brother          Tobacco  Use    Smoking status: Former     Current packs/day: 0.00     Types: Cigarettes     Quit date: 1979     Years since quittin.7    Smokeless tobacco: Former   Substance and Sexual Activity    Alcohol use: No    Drug use: Never    Sexual activity: Not on file     Review of Systems   Constitutional:  Negative for activity change, chills and fever.   HENT:  Negative for trouble swallowing.    Eyes:  Negative for photophobia and visual disturbance.   Respiratory:  Negative for cough, chest tightness and shortness of breath.    Cardiovascular:  Negative for chest pain, palpitations and leg swelling.   Gastrointestinal:  Negative for abdominal pain, constipation, diarrhea, nausea and vomiting.   Genitourinary:  Negative for dysuria, frequency and hematuria.   Musculoskeletal:  Negative for back pain, gait problem and neck pain.   Skin:  Negative for rash and wound.   Neurological:  Negative for dizziness, syncope, speech difficulty and light-headedness.   Psychiatric/Behavioral:  Positive for behavioral problems and hallucinations. Negative for agitation and confusion. The patient is not nervous/anxious.      Objective:     Vital Signs (Most Recent):  Temp: 98 °F (36.7 °C) (23)  Pulse: 72 (23)  Resp: 16 (23)  BP: (!) 145/61 (23)  SpO2: 100 % (23) Vital Signs (24h Range):  Temp:  [98 °F (36.7 °C)-98.3 °F (36.8 °C)] 98 °F (36.7 °C)  Pulse:  [72-96] 72  Resp:  [16] 16  SpO2:  [95 %-100 %] 100 %  BP: (107-145)/(61) 145/61     Weight: 77.1 kg (170 lb)  Body mass index is 26.63 kg/m².     Physical Exam  Vitals and nursing note reviewed.   Constitutional:       General: She is not in acute distress.     Appearance: She is well-developed.   HENT:      Head: Normocephalic and atraumatic.      Mouth/Throat:      Pharynx: No oropharyngeal exudate.   Eyes:      Conjunctiva/sclera: Conjunctivae normal.      Pupils: Pupils are equal, round, and reactive to light.    Cardiovascular:      Rate and Rhythm: Normal rate and regular rhythm.      Heart sounds: Normal heart sounds.   Pulmonary:      Effort: Pulmonary effort is normal. No respiratory distress.      Breath sounds: Normal breath sounds. No wheezing.   Abdominal:      General: Bowel sounds are normal. There is no distension.      Palpations: Abdomen is soft.      Tenderness: There is no abdominal tenderness.   Musculoskeletal:         General: No tenderness. Normal range of motion.      Cervical back: Normal range of motion and neck supple.   Lymphadenopathy:      Cervical: No cervical adenopathy.   Skin:     General: Skin is warm and dry.      Capillary Refill: Capillary refill takes less than 2 seconds.      Findings: No rash.   Neurological:      Mental Status: She is alert and oriented to person, place, and time.      Cranial Nerves: No cranial nerve deficit.      Sensory: No sensory deficit.      Coordination: Coordination normal.   Psychiatric:         Behavior: Behavior normal.         Thought Content: Thought content normal.         Judgment: Judgment normal.              CRANIAL NERVES     CN III, IV, VI   Pupils are equal, round, and reactive to light.       Significant Labs: All pertinent labs within the past 24 hours have been reviewed.    Significant Imaging: I have reviewed all pertinent imaging results/findings within the past 24 hours.

## 2023-09-23 NOTE — HPI
Patient is a 69yo female with a PMHx of bipolar disorder being admitted to observation for hallucinations.  She also has auditory hallucinations. She is supposed to be on aripiprazole, bupropion, duloxetine, lamotrigine, olanzapine. She has been out of 1 of her medications due to financial issues and them not authorizing it, she lost her sample prescription. She reports auditory and visual hallucinations, responding to internal stimuli. No SI or HI. Multiple bruising from recent trauma, stating that fibromyalgia and moving with her walker she often falls. Today, she slipped in the bathroom and fell.  She hit her head. No LOC. She has had multiple prior falls. Her falls seem to be non syncopal in nature, stating that occasionally he was because she was transferring from walking with her walker to not, or when she slips on slippery floor. She also has chronic fibromyalgia was difficulty with ambulation so she is constantly using a walker.    In the ED, vitals stable. Intake labs remarkable for Cr 1.8, UA with hyaline casts and 13 WBCs. CT chest with spiculated 9 mm solid pulmonary nodule in the left lower lobe, suspicious for malignancy, Compression fractures of T8, T12 and L4, with few mm retropulsion at the T12 and L4 levels, Acute fracture posterior right 8th rib with mild displacement. CT max with bilateral acute nasal fractures involving the ala and nasal bridge. She was PECd in the ED for grave disability and admitted for psych placement and medical clearance.

## 2023-09-23 NOTE — PROGRESS NOTES
[facility]  Progress Note    Patient Name: Elly Harris  MRN: [id number]  Patient Class: OP- Observation   Admission Date: 9/22/2023  Length of Stay: 0 days  Attending Physician: Juliocesar Cutler MD  Primary Care Provider: Srikanth Preston MD        Subjective:     Principal Problem:Acute kidney injury        HPI:  Patient is a 67yo female with a PMHx of bipolar disorder being admitted to observation for hallucinations.  She also has auditory hallucinations. She is supposed to be on aripiprazole, bupropion, duloxetine, lamotrigine, olanzapine. She has been out of 1 of her medications due to financial issues and them not authorizing it, she lost her sample prescription. She reports auditory and visual hallucinations, responding to internal stimuli. No SI or HI. Multiple bruising from recent trauma, stating that fibromyalgia and moving with her walker she often falls. Today, she slipped in the bathroom and fell.  She hit her head. No LOC. She has had multiple prior falls. Her falls seem to be non syncopal in nature, stating that occasionally he was because she was transferring from walking with her walker to not, or when she slips on slippery floor. She also has chronic fibromyalgia was difficulty with ambulation so she is constantly using a walker.    In the ED, vitals stable. Intake labs remarkable for Cr 1.8, UA with hyaline casts and 13 WBCs. CT chest with spiculated 9 mm solid pulmonary nodule in the left lower lobe, suspicious for malignancy, Compression fractures of T8, T12 and L4, with few mm retropulsion at the T12 and L4 levels, Acute fracture posterior right 8th rib with mild displacement. CT max with bilateral acute nasal fractures involving the ala and nasal bridge. She was PECd in the ED for grave disability and admitted for psych placement and medical clearance.      Overview/Hospital Course:  No notes on file    Interval History: NAEON. Pt is more calm , denies any  complaints except for hallucination     Review of Systems   Constitutional:  Negative for appetite change.   Respiratory:  Negative for cough and shortness of breath.    Cardiovascular:  Negative for chest pain and leg swelling.   Gastrointestinal:  Negative for abdominal distention, abdominal pain, constipation and diarrhea.   Genitourinary:  Negative for difficulty urinating and dysuria.   Neurological:  Negative for dizziness and headaches.   Psychiatric/Behavioral:  Positive for hallucinations.      Objective:     Vital Signs (Most Recent):  Temp: 98 °F (36.7 °C) (09/23/23 1123)  Pulse: 73 (09/23/23 1123)  Resp: 16 (09/23/23 1123)  BP: 135/60 (09/23/23 1123)  SpO2: (!) 93 % (09/23/23 1123) Vital Signs (24h Range):  Temp:  [98 °F (36.7 °C)-98.3 °F (36.8 °C)] 98 °F (36.7 °C)  Pulse:  [70-96] 73  Resp:  [16] 16  SpO2:  [93 %-100 %] 93 %  BP: (107-149)/(60-65) 135/60     Weight: 77.1 kg (170 lb)  Body mass index is 26.63 kg/m².  No intake or output data in the 24 hours ending 09/23/23 1457      Physical Exam  Constitutional:       Appearance: Normal appearance.   HENT:      Head: Normocephalic and atraumatic.      Mouth/Throat:      Mouth: Mucous membranes are moist.   Cardiovascular:      Rate and Rhythm: Normal rate and regular rhythm.      Pulses: Normal pulses.      Heart sounds: Normal heart sounds.   Pulmonary:      Effort: Pulmonary effort is normal.      Breath sounds: Normal breath sounds. No rales.   Abdominal:      General: Abdomen is flat. Bowel sounds are normal. There is no distension.      Palpations: Abdomen is soft.      Tenderness: There is no abdominal tenderness.   Musculoskeletal:         General: Normal range of motion.      Cervical back: Normal range of motion and neck supple.   Skin:     General: Skin is warm and dry.   Neurological:      General: No focal deficit present.      Mental Status: She is alert and oriented to person, place, and time.   Psychiatric:         Mood and Affect: Mood  normal.             Significant Labs: All pertinent labs within the past 24 hours have been reviewed.    Significant Imaging: I have reviewed all pertinent imaging results/findings within the past 24 hours.      Assessment/Plan:      * Acute kidney injury  Patient with acute kidney injury/acute renal failure likely due to acute tubular necrosis caused by dehydration / nsaids JESSICA is currently worsening. Baseline creatinine 1.1 - Labs reviewed- Renal function/electrolytes with Estimated Creatinine Clearance: 37.9 mL/min (A) (based on SCr of 1.5 mg/dL (H)). according to latest data. Monitor urine output and serial BMP and adjust therapy as needed. Avoid nephrotoxins and renally dose meds for GFR listed above. Improving     Essential hypertension  - metoprolol 100mg daily    Bipolar 2 disorder        Psychosis  Bipolar 2  - psych consulted for recs  - resume home meds  - PECd, will need psych placement      VTE Risk Mitigation (From admission, onward)         Ordered     IP VTE LOW RISK PATIENT  Once         09/23/23 0014     Place sequential compression device  Until discontinued         09/23/23 0014                Discharge Planning   FARAZ: 9/25/2023     Code Status: Full Code   Is the patient medically ready for discharge?:     Reason for patient still in hospital (select all that apply): Treatment                     Juliocesar Cutler MD  Department of Hospital Medicine   [facility]

## 2023-09-23 NOTE — PROGRESS NOTES
Pt has been AAO X3. She stated she sees drawings on the wall like stick people and some have sharp teeth and look menacing  to her.I reported this to the med team via secure chat.. Sitter remains at the bedside pt has been calm cooperative with no outbursts.took meds when offered, meals and fluid intake are good. Attempted to call pts daughter twice, left a message that pt wanted to speak to her but she has not called back yet.

## 2023-09-23 NOTE — PLAN OF CARE
Problem: Adult Inpatient Plan of Care  Goal: Plan of Care Review  Outcome: Ongoing, Progressing  Goal: Patient-Specific Goal (Individualized)  Outcome: Ongoing, Progressing  Goal: Absence of Hospital-Acquired Illness or Injury  Outcome: Ongoing, Progressing  Goal: Optimal Comfort and Wellbeing  Outcome: Ongoing, Progressing  Goal: Readiness for Transition of Care  Outcome: Ongoing, Progressing     Problem: Infection  Goal: Absence of Infection Signs and Symptoms  Outcome: Ongoing, Progressing     Problem: Fluid and Electrolyte Imbalance (Acute Kidney Injury/Impairment)  Goal: Fluid and Electrolyte Balance  Outcome: Ongoing, Progressing     Problem: Oral Intake Inadequate (Acute Kidney Injury/Impairment)  Goal: Optimal Nutrition Intake  Outcome: Ongoing, Progressing     Problem: Renal Function Impairment (Acute Kidney Injury/Impairment)  Goal: Effective Renal Function  Outcome: Ongoing, Progressing     Problem: Skin Injury Risk Increased  Goal: Skin Health and Integrity  Outcome: Ongoing, Progressing     Problem: Fall Injury Risk  Goal: Absence of Fall and Fall-Related Injury  Outcome: Ongoing, Progressing     Problem: Behavior Regulation Impairment (Psychotic Signs/Symptoms)  Goal: Improved Behavioral Control (Psychotic Signs/Symptoms)  Outcome: Ongoing, Progressing     Problem: Cognitive Impairment (Psychotic Signs/Symptoms)  Goal: Optimal Cognitive Function (Psychotic Signs/Symptoms)  Outcome: Ongoing, Progressing     Problem: Decreased Participation and Engagement (Psychotic Signs/Symptoms)  Goal: Increased Participation and Engagement (Psychotic Signs/Symptoms)  Outcome: Ongoing, Progressing     Problem: Mood Impairment (Psychotic Signs/Symptoms)  Goal: Improved Mood Symptoms (Psychotic Signs/Symptoms)  Outcome: Ongoing, Progressing     Problem: Psychomotor Impairment (Psychotic Signs/Symptoms)  Goal: Improved Psychomotor Symptoms (Psychotic Signs/Symptoms)  Outcome: Ongoing, Progressing     Problem: Sensory  Perception Impairment (Psychotic Signs/Symptoms)  Goal: Decreased Sensory Symptoms (Psychotic Signs/Symptoms)  Outcome: Ongoing, Progressing     Problem: Sleep Disturbance (Psychotic Signs/Symptoms)  Goal: Improved Sleep (Psychotic Signs/Symptoms)  Outcome: Ongoing, Progressing     Problem: Social, Occupational or Functional Impairment (Psychotic Signs/Symptoms)  Goal: Enhanced Social, Occupational or Functional Skills (Psychotic Signs/Symptoms)  Outcome: Ongoing, Progressing

## 2023-09-23 NOTE — H&P
[facility] Medicine  History & Physical    Patient Name: Elly Harris  MRN: [id number]  Patient Class: OP- Observation  Admission Date: 9/22/2023  Attending Physician: Jorge Phillips MD   Primary Care Provider: Srikanth Preston MD         Patient information was obtained from patient, past medical records and ER records.     Subjective:     Principal Problem:Acute kidney injury    Chief Complaint:   Chief Complaint   Patient presents with    Hallucinations     Patient states seeing bugs crawling on her body and in her house, has seen multiple people (hallucinations) and states she speaks to them. Patient unkempt, fecal matter on clothing        HPI: Patient is a 69yo female with a PMHx of bipolar disorder being admitted to observation for hallucinations.  She also has auditory hallucinations. She is supposed to be on aripiprazole, bupropion, duloxetine, lamotrigine, olanzapine. She has been out of 1 of her medications due to financial issues and them not authorizing it, she lost her sample prescription. She reports auditory and visual hallucinations, responding to internal stimuli. No SI or HI. Multiple bruising from recent trauma, stating that fibromyalgia and moving with her walker she often falls. Today, she slipped in the bathroom and fell.  She hit her head. No LOC. She has had multiple prior falls. Her falls seem to be non syncopal in nature, stating that occasionally he was because she was transferring from walking with her walker to not, or when she slips on slippery floor. She also has chronic fibromyalgia was difficulty with ambulation so she is constantly using a walker.    In the ED, vitals stable. Intake labs remarkable for Cr 1.8, UA with hyaline casts and 13 WBCs. CT chest with spiculated 9 mm solid pulmonary nodule in the left lower lobe, suspicious for malignancy, Compression fractures of T8, T12 and L4, with few mm retropulsion at the T12 and L4 levels, Acute fracture  posterior right 8th rib with mild displacement. CT max with bilateral acute nasal fractures involving the ala and nasal bridge. She was PECd in the ED for grave disability and admitted for psych placement and medical clearance.      Past Medical History:   Diagnosis Date    Anemia     Depression     Fibromyalgia     GERD (gastroesophageal reflux disease)     Herpes zoster infection of thoracic region 2014    left    Hyperlipidemia     Hypertension     Migraine headache     RLS (restless legs syndrome)        Past Surgical History:   Procedure Laterality Date    BILATERAL SALPINGOOPHORECTOMY       SECTION      CLOSED REDUCTION OF FRACTURE OF NASAL BONE Bilateral 2021    Procedure: CLOSED REDUCTION, FRACTURE, NASAL BONE;  Surgeon: JV Connelly MD;  Location: [facility] OR;  Service: ENT;  Laterality: Bilateral;    CLOSED REDUCTION OF FRACTURE OF NASAL BONE Bilateral 2021    Procedure: CLOSED REDUCTION, FRACTURE, NASAL BONE;  Surgeon: JV Connelly MD;  Location: [facility] OR;  Service: ENT;  Laterality: Bilateral;    DEBRIDEMENT TENNIS ELBOW      FEMUR SURGERY  2013    FOOT NEUROMA SURGERY Bilateral     with hammertoe repair    HYSTERECTOMY      vaginal    LASIK      MANDIBLE FRACTURE SURGERY      TONSILLECTOMY      TOTAL VAGINAL HYSTERECTOMY         Review of patient's allergies indicates:   Allergen Reactions    Doxycycline Rash and Hives       No current facility-administered medications on file prior to encounter.     Current Outpatient Medications on File Prior to Encounter   Medication Sig    acetaminophen (TYLENOL) 500 MG tablet Take 1,000 mg by mouth 2 (two) times daily as needed for Pain.    ARIPiprazole (ABILIFY) 15 MG Tab Take 15 mg by mouth once daily.    atorvastatin (LIPITOR) 20 MG tablet Take 1 tablet by mouth once daily.    blood-gluc,BP meter,adult cuff Shira 1 each by Misc.(Non-Drug; Combo Route) route every morning.    buPROPion (WELLBUTRIN XL)  150 MG TB24 tablet Take 150 mg by mouth once daily.    cyanocobalamin (VITAMIN B-12) 250 MCG tablet Take 1 tablet (250 mcg total) by mouth once daily.    cyclobenzaprine (FLEXERIL) 10 MG tablet Take 1 tablet (10 mg total) by mouth 3 (three) times daily as needed. (Patient taking differently: Take 10 mg by mouth 3 (three) times daily as needed for Muscle spasms.)    DULoxetine (CYMBALTA) 30 MG capsule Take 30 mg by mouth every evening. (60MG QAM & 30MG QPM)    DULoxetine (CYMBALTA) 60 MG capsule Take 1 capsule (60 mg total) by mouth once daily. (Patient taking differently: Take 60 mg by mouth every morning. (60MG QAM & 30MG QPM))    esomeprazole (NEXIUM) 40 MG capsule Take 1 capsule (40 mg total) by mouth once daily.    ferrous sulfate 325 (65 FE) MG EC tablet Take 1 tablet (325 mg total) by mouth once daily.    FLUoxetine 20 MG capsule Take 20 mg by mouth once daily.    gabapentin (NEURONTIN) 100 MG capsule Take 1 capsule (100 mg total) by mouth 2 (two) times daily.    lamoTRIgine (LAMICTAL) 200 MG tablet Take 200 mg by mouth 2 (two) times daily.    lorazepam (ATIVAN) 0.5 MG tablet Take 1 tablet (0.5 mg total) by mouth daily as needed. (Patient taking differently: Take 0.5 mg by mouth daily as needed for Anxiety.)    losartan-hydrochlorothiazide 100-25 mg (HYZAAR) 100-25 mg per tablet Take 1 tablet by mouth once daily.    melatonin (MELATIN) 5 mg Take 10 mg by mouth nightly as needed (sleep).    metoprolol succinate (TOPROL-XL) 100 MG 24 hr tablet Take 1 tablet by mouth daily.    naproxen (NAPROSYN) 500 MG tablet Take 500 mg by mouth 2 (two) times daily with meals.    OLANZapine (ZYPREXA) 5 MG tablet Take 5 mg by mouth once daily.    polyethylene glycol (GLYCOLAX) 17 gram PwPk Take 17 g by mouth 2 (two) times daily as needed (constipation caused by iron supplement).    sumatriptan (IMITREX) 100 MG tablet TAKE ONE TABLET BY MOUTH AT ONSET, THEN ONE TABLET 2 HOURS LATER FOR EACH EPISODE     triamcinolone acetonide 0.1% (KENALOG) 0.1 % cream      Family History       Problem Relation (Age of Onset)    Alzheimer's disease Mother    Asbestos Father    COPD Brother    Heart disease Brother    Hyperlipidemia Mother    Hypertension Father    Lymphoma Brother          Tobacco Use    Smoking status: Former     Current packs/day: 0.00     Types: Cigarettes     Quit date: 1979     Years since quittin.7    Smokeless tobacco: Former   Substance and Sexual Activity    Alcohol use: No    Drug use: Never    Sexual activity: Not on file     Review of Systems   Constitutional:  Negative for activity change, chills and fever.   HENT:  Negative for trouble swallowing.    Eyes:  Negative for photophobia and visual disturbance.   Respiratory:  Negative for cough, chest tightness and shortness of breath.    Cardiovascular:  Negative for chest pain, palpitations and leg swelling.   Gastrointestinal:  Negative for abdominal pain, constipation, diarrhea, nausea and vomiting.   Genitourinary:  Negative for dysuria, frequency and hematuria.   Musculoskeletal:  Negative for back pain, gait problem and neck pain.   Skin:  Negative for rash and wound.   Neurological:  Negative for dizziness, syncope, speech difficulty and light-headedness.   Psychiatric/Behavioral:  Positive for behavioral problems and hallucinations. Negative for agitation and confusion. The patient is not nervous/anxious.      Objective:     Vital Signs (Most Recent):  Temp: 98 °F (36.7 °C) (23)  Pulse: 72 (23)  Resp: 16 (23)  BP: (!) 145/61 (23)  SpO2: 100 % (23) Vital Signs (24h Range):  Temp:  [98 °F (36.7 °C)-98.3 °F (36.8 °C)] 98 °F (36.7 °C)  Pulse:  [72-96] 72  Resp:  [16] 16  SpO2:  [95 %-100 %] 100 %  BP: (107-145)/(61) 145/61     Weight: 77.1 kg (170 lb)  Body mass index is 26.63 kg/m².     Physical Exam  Vitals and nursing note reviewed.   Constitutional:       General: She is not in  acute distress.     Appearance: She is well-developed.   HENT:      Head: Normocephalic and atraumatic.      Mouth/Throat:      Pharynx: No oropharyngeal exudate.   Eyes:      Conjunctiva/sclera: Conjunctivae normal.      Pupils: Pupils are equal, round, and reactive to light.   Cardiovascular:      Rate and Rhythm: Normal rate and regular rhythm.      Heart sounds: Normal heart sounds.   Pulmonary:      Effort: Pulmonary effort is normal. No respiratory distress.      Breath sounds: Normal breath sounds. No wheezing.   Abdominal:      General: Bowel sounds are normal. There is no distension.      Palpations: Abdomen is soft.      Tenderness: There is no abdominal tenderness.   Musculoskeletal:         General: No tenderness. Normal range of motion.      Cervical back: Normal range of motion and neck supple.   Lymphadenopathy:      Cervical: No cervical adenopathy.   Skin:     General: Skin is warm and dry.      Capillary Refill: Capillary refill takes less than 2 seconds.      Findings: No rash.   Neurological:      Mental Status: She is alert and oriented to person, place, and time.      Cranial Nerves: No cranial nerve deficit.      Sensory: No sensory deficit.      Coordination: Coordination normal.   Psychiatric:         Behavior: Behavior normal.         Thought Content: Thought content normal.         Judgment: Judgment normal.              CRANIAL NERVES     CN III, IV, VI   Pupils are equal, round, and reactive to light.       Significant Labs: All pertinent labs within the past 24 hours have been reviewed.    Significant Imaging: I have reviewed all pertinent imaging results/findings within the past 24 hours.    Assessment/Plan:     * Acute kidney injury  Patient with acute kidney injury/acute renal failure likely due to acute tubular necrosis caused by dehydration / nsaids JESSICA is currently worsening. Baseline creatinine 1.1 - Labs reviewed- Renal function/electrolytes with Estimated Creatinine Clearance:  31.6 mL/min (A) (based on SCr of 1.8 mg/dL (H)). according to latest data. Monitor urine output and serial BMP and adjust therapy as needed. Avoid nephrotoxins and renally dose meds for GFR listed above.    Essential hypertension  - metoprolol 100mg daily    Psychosis  Bipolar 2  - psych consulted for recs  - resume home meds  - PECd, will need psych placement      VTE Risk Mitigation (From admission, onward)         Ordered     IP VTE LOW RISK PATIENT  Once         09/23/23 0014     Place sequential compression device  Until discontinued         09/23/23 0014                     On 09/23/2023, patient should be placed in hospital observation services under my care in collaboration with Abel Santos.      Vinnie Chapa PA-C  Department of Hospital Medicine  [facility] - Med Surg

## 2023-09-23 NOTE — ASSESSMENT & PLAN NOTE
Patient with acute kidney injury/acute renal failure likely due to acute tubular necrosis caused by dehydration / nsaids JESSICA is currently worsening. Baseline creatinine 1.1 - Labs reviewed- Renal function/electrolytes with Estimated Creatinine Clearance: 37.9 mL/min (A) (based on SCr of 1.5 mg/dL (H)). according to latest data. Monitor urine output and serial BMP and adjust therapy as needed. Avoid nephrotoxins and renally dose meds for GFR listed above. Improving

## 2023-09-23 NOTE — NURSING
Nurses Note -- 4 Eyes      9/23/2023   1:42 AM      Skin assessed during: Admit      [] No Altered Skin Integrity Present    []Prevention Measures Documented      [x] Yes- Altered Skin Integrity Present or Discovered   [x] LDA Added if Not in Epic (Describe Wound)   [x] New Altered Skin Integrity was Present on Admit and Documented in LDA   [x] Wound Image Taken    Wound Care Consulted? No    Attending Nurse:  Venus Nunez RN/Staff Member:   Michelle GUAMAN

## 2023-09-24 LAB
ANION GAP SERPL CALC-SCNC: 7 MMOL/L (ref 8–16)
ANISOCYTOSIS BLD QL SMEAR: SLIGHT
BACTERIA UR CULT: NORMAL
BASOPHILS # BLD AUTO: 0.02 K/UL (ref 0–0.2)
BASOPHILS NFR BLD: 0.5 % (ref 0–1.9)
BUN SERPL-MCNC: 21 MG/DL (ref 8–23)
CALCIUM SERPL-MCNC: 8.9 MG/DL (ref 8.7–10.5)
CHLORIDE SERPL-SCNC: 108 MMOL/L (ref 95–110)
CO2 SERPL-SCNC: 26 MMOL/L (ref 23–29)
CREAT SERPL-MCNC: 1.4 MG/DL (ref 0.5–1.4)
DIFFERENTIAL METHOD: ABNORMAL
EOSINOPHIL # BLD AUTO: 0.2 K/UL (ref 0–0.5)
EOSINOPHIL NFR BLD: 5.6 % (ref 0–8)
ERYTHROCYTE [DISTWIDTH] IN BLOOD BY AUTOMATED COUNT: ABNORMAL % (ref 11.5–14.5)
EST. GFR  (NO RACE VARIABLE): 40.7 ML/MIN/1.73 M^2
GLUCOSE SERPL-MCNC: 94 MG/DL (ref 70–110)
HCT VFR BLD AUTO: 26.7 % (ref 37–48.5)
HGB BLD-MCNC: 8.1 G/DL (ref 12–16)
IMM GRANULOCYTES # BLD AUTO: 0.01 K/UL (ref 0–0.04)
IMM GRANULOCYTES NFR BLD AUTO: 0.2 % (ref 0–0.5)
LYMPHOCYTES # BLD AUTO: 1 K/UL (ref 1–4.8)
LYMPHOCYTES NFR BLD: 23.1 % (ref 18–48)
MCH RBC QN AUTO: 25.2 PG (ref 27–31)
MCHC RBC AUTO-ENTMCNC: 30.3 G/DL (ref 32–36)
MCV RBC AUTO: 83 FL (ref 82–98)
MONOCYTES # BLD AUTO: 0.4 K/UL (ref 0.3–1)
MONOCYTES NFR BLD: 10.5 % (ref 4–15)
NEUTROPHILS # BLD AUTO: 2.5 K/UL (ref 1.8–7.7)
NEUTROPHILS NFR BLD: 60.1 % (ref 38–73)
NRBC BLD-RTO: 0 /100 WBC
OVALOCYTES BLD QL SMEAR: ABNORMAL
PLATELET # BLD AUTO: 158 K/UL (ref 150–450)
PLATELET BLD QL SMEAR: ABNORMAL
PMV BLD AUTO: 10.7 FL (ref 9.2–12.9)
POIKILOCYTOSIS BLD QL SMEAR: SLIGHT
POTASSIUM SERPL-SCNC: 4 MMOL/L (ref 3.5–5.1)
RBC # BLD AUTO: 3.21 M/UL (ref 4–5.4)
SODIUM SERPL-SCNC: 141 MMOL/L (ref 136–145)
WBC # BLD AUTO: 4.11 K/UL (ref 3.9–12.7)

## 2023-09-24 PROCEDURE — 85025 COMPLETE CBC W/AUTO DIFF WBC: CPT | Mod: HCNC | Performed by: PHYSICIAN ASSISTANT

## 2023-09-24 PROCEDURE — 99233 PR SUBSEQUENT HOSPITAL CARE,LEVL III: ICD-10-PCS | Mod: HCNC,,, | Performed by: INTERNAL MEDICINE

## 2023-09-24 PROCEDURE — 80048 BASIC METABOLIC PNL TOTAL CA: CPT | Mod: HCNC | Performed by: PHYSICIAN ASSISTANT

## 2023-09-24 PROCEDURE — 25000003 PHARM REV CODE 250: Mod: HCNC | Performed by: PHYSICIAN ASSISTANT

## 2023-09-24 PROCEDURE — 36415 COLL VENOUS BLD VENIPUNCTURE: CPT | Mod: HCNC | Performed by: PHYSICIAN ASSISTANT

## 2023-09-24 PROCEDURE — 99214 OFFICE O/P EST MOD 30 MIN: CPT | Mod: HCNC,GC,, | Performed by: INTERNAL MEDICINE

## 2023-09-24 PROCEDURE — 25000003 PHARM REV CODE 250: Mod: HCNC | Performed by: INTERNAL MEDICINE

## 2023-09-24 PROCEDURE — G0378 HOSPITAL OBSERVATION PER HR: HCPCS | Mod: HCNC

## 2023-09-24 PROCEDURE — 99214 PR OFFICE/OUTPT VISIT, EST, LEVL IV, 30-39 MIN: ICD-10-PCS | Mod: HCNC,GC,, | Performed by: INTERNAL MEDICINE

## 2023-09-24 PROCEDURE — 63600175 PHARM REV CODE 636 W HCPCS: Mod: HCNC | Performed by: PHYSICIAN ASSISTANT

## 2023-09-24 PROCEDURE — 96366 THER/PROPH/DIAG IV INF ADDON: CPT

## 2023-09-24 PROCEDURE — 99233 SBSQ HOSP IP/OBS HIGH 50: CPT | Mod: HCNC,,, | Performed by: INTERNAL MEDICINE

## 2023-09-24 RX ORDER — OXYCODONE AND ACETAMINOPHEN 5; 325 MG/1; MG/1
1 TABLET ORAL EVERY 6 HOURS PRN
Status: DISCONTINUED | OUTPATIENT
Start: 2023-09-24 | End: 2023-09-24

## 2023-09-24 RX ORDER — HYDROCODONE BITARTRATE AND ACETAMINOPHEN 7.5; 325 MG/1; MG/1
1 TABLET ORAL EVERY 8 HOURS PRN
Status: DISCONTINUED | OUTPATIENT
Start: 2023-09-24 | End: 2023-09-25 | Stop reason: HOSPADM

## 2023-09-24 RX ADMIN — CEFTRIAXONE 1 G: 1 INJECTION, POWDER, FOR SOLUTION INTRAMUSCULAR; INTRAVENOUS at 08:09

## 2023-09-24 RX ADMIN — LAMOTRIGINE 200 MG: 100 TABLET ORAL at 09:09

## 2023-09-24 RX ADMIN — ACETAMINOPHEN 1000 MG: 500 TABLET ORAL at 09:09

## 2023-09-24 RX ADMIN — Medication 6 MG: at 08:09

## 2023-09-24 RX ADMIN — ACETAMINOPHEN 650 MG: 325 TABLET ORAL at 04:09

## 2023-09-24 RX ADMIN — ARIPIPRAZOLE 15 MG: 10 TABLET ORAL at 09:09

## 2023-09-24 RX ADMIN — HYDROCODONE BITARTRATE AND ACETAMINOPHEN 1 TABLET: 7.5; 325 TABLET ORAL at 09:09

## 2023-09-24 RX ADMIN — LAMOTRIGINE 200 MG: 100 TABLET ORAL at 08:09

## 2023-09-24 RX ADMIN — DULOXETINE HYDROCHLORIDE 60 MG: 60 CAPSULE, DELAYED RELEASE ORAL at 06:09

## 2023-09-24 RX ADMIN — DULOXETINE HYDROCHLORIDE 30 MG: 30 CAPSULE, DELAYED RELEASE ORAL at 08:09

## 2023-09-24 RX ADMIN — HYDROCODONE BITARTRATE AND ACETAMINOPHEN 1 TABLET: 7.5; 325 TABLET ORAL at 05:09

## 2023-09-24 RX ADMIN — METOPROLOL SUCCINATE 100 MG: 100 TABLET, EXTENDED RELEASE ORAL at 09:09

## 2023-09-24 NOTE — HOSPITAL COURSE
Pt was admitted for hallucination and JESSICA. Her kidney function is back to normal. Seen by psych , her meds have been adjusted. Hallucinations have resolved and mood stable. Psych has recommending rescinding PEC. Patient is cleared for discharge home. Follow up with psychiatry outpatient.

## 2023-09-24 NOTE — PROGRESS NOTES
"CONSULTATION-[facility]IAISON PSYCHIATRY PROGRESS NOTE    Patient Name: Elly Harris  MRN: [id number]  Patient Class: OP- Observation  Admission Date: 9/22/2023  Attending Physician: Juliocesar Cutler MD      SUBJECTIVE:   Elly Harris is a 68 y.o. female with past psychiatric history of Unspecified Bipolar Disorder & past pertinent medical history of osteoporosis, KEVIN, multiple prior falls presents to the ED/admitted to the hospital for Acute kidney injury. Initially presented for hallucinations of +VH of bugs crawling on body & in home, seeing people; pt notably unkempt w/ fecal matter on clothing in ED. Notable work-ups in ED include: spiculated 9mm solid pulm nodule in LLL suspicious for malignancy, compression fractures at T8/T12/L4 w/ acute fx @ 8th rib (R posterior), bilateral acute nasal fx w/ scalp contusion, UDS+ benzo (per , last 30 day supply of Ativan 0.5mg daily filled on 9/1/23)     Psychiatry consulted for "PECd, grave disability" (signed 9/22 @1813hrs)    Patient with no acute events overnight. Did not require any behavioral PRNs and adherent to scheduled psych meds. On today's psych eval, alert and oriented x3, calm and cooperative. She continues to report baseline auditory hallucinations as well as visual hallucinations of "graffiti on the wall" that she confirms is not distressing or bothersome to her. She otherwise has no other objective concerns of psychosis or fransico, other than unclear story of "people being in her house" which she states multiple times, "may have actually been a dream." She denies feeling concerned that these people may hurt her. She denies SI/HI or paranoia or confusion. Reports sleeping and eating well. She reports intent to continue staying in hospital until her medical issues have been completely treated. Otherwise no further complaints.      OBJECTIVE:    Mental Status Exam:  General Appearance: appears stated age, well developed and nourished, adequately groomed and " "appropriately dressed, in no acute distress  Behavior: normal; cooperative; reasonably friendly, pleasant, and polite; appropriate eye-contact; under good behavioral control  Involuntary Movements and Motor Activity: no abnormal involuntary movements noted; no tics, no tremors, no akathisia, no dystonia, no evidence of tardive dyskinesia; no psychomotor agitation or retardation  Gait and Station: unable to assess - patient lying down or seated  Speech and Language: intact; normal rate, rhythm, volume, tone, and pitch; conversational, spontaneous, and coherent; speaks and understands English proficiently and fluently; repeats words and phrases, no word finding difficulties are noted  Mood: "ok"  Affect: normal, euthymic, reactive, full-range, mood-congruent, appropriate to situation and context  Thought Process and Associations:  patient linear and organized throughout interview. Only with mild disorganized thought/questionable delusions when referring to episode "which she think might have been a dream.'  Thought Content and Perceptions:: no suicidal ideation, no homicidal ideation, positive auditory hallucinations unchanged from baseline (hearing music), positive visual hallucinations of "graffiti on wall."   Sensorium and Orientation: intact; alert with clear sensorium; oriented fully to person, place, time and situation  Recent and Remote Memory: grossly intact  Attention and Concentration: grossly intact  Fund of Knowledge: grossly intact  Insight: adequate  Judgment: adequate    CAM ICU positive? no      ASSESSMENT & RECOMMENDATIONS   Unspecified Bipolar Disorder  Benzodiazepine use disorder     Continue home meds:  Duloxetine 60 mg/30 mg AM/PM  Abilify 15 mg daily  Lamotrigine 200 mg BID  UDS+ benzo. Prescribed Ativan 0.5mg daily PRN for anxiety. Since the beginning of this month, pt stated taking 2-3x Ativans per day, implying previous non-daily use of monthly prescriptions that allowed more than rx'd use on " tail-end.  Provide education/encouragement to wean-off benzo use and recommended Vistaril for anxiety during hospital stay and home  PRN Zyprexa 5mg PO/IM for verbally non-redirectable agitation.  9/22/23 EKG nsr w/ QTc 421ms     DELIRIUM  DELIRIUM BEHAVIOR MANAGEMENT  PLEASE utilize CHEMICAL restraints with PRN meds first for agitation. Minimize use of PHYSICAL restraints OR have periods of being out of physical restraints if possible.  Keep window shades open and room lit during day and room dim at night in order to promote normal sleep-wake cycles  Encourage family at bedside. [place] patient often to situation, location, date.  Continue to Limit or Discontinue use of Narcotics, Benzos and Anti-cholinergic medications as they may worsen delirium.  Continue medical workup for causative etiology of Delirium.      OTHER PERTINENT DIAGNOSIS     RISK ASSESSMENT  RECOMMEND RESCIND PEC because patient is not in imminent danger of hurting self or others and is no longer gravely disabled. She has voiced intent to stay in hospital until completion of medical treatment.      FOLLOW UP  Will follow up while in house     DISPOSITION - once medically cleared:   Defer to medical team    Please contact ON CALL psychiatry service (24/7) for any acute issues that may arise.      Russell Trevino MD  [facility], PGY-II      --------------------------------------------------------------------------------------------------------------------------------------------------------------------------------------------------------------------------------------    CONTINUED OBJECTIVE clinical data & findings reviewed and noted for above decision-making    Current Medications:   Scheduled Meds:    ARIPiprazole  15 mg Oral Daily    cefTRIAXone (ROCEPHIN) IVPB  1 g Intravenous Q24H    DULoxetine  30 mg Oral QHS    DULoxetine  60 mg Oral QAM    lamoTRIgine  200 mg Oral BID    metoprolol succinate  100 mg Oral Daily     PRN Meds:  acetaminophen, acetaminophen, aluminum-magnesium hydroxide-simethicone, bisacodyL, HYDROcodone-acetaminophen, melatonin, naloxone, ondansetron, prochlorperazine, sodium chloride 0.9%, DIPH,PERTUSS(ACELL),TET VACCINE (ADULT)(BOOSTRIX,ADACEL)    Allergies:   Review of patient's allergies indicates:   Allergen Reactions    Doxycycline Rash and Hives       Vitals  Vitals:    09/24/23 1125   BP: (!) 117/59   Pulse: 76   Resp: 18   Temp: 98.1 °F (36.7 °C)       Labs/Imaging/Studies:  Recent Results (from the past 24 hour(s))   Basic Metabolic Panel (BMP)    Collection Time: 09/24/23  4:37 AM   Result Value Ref Range    Sodium 141 136 - 145 mmol/L    Potassium 4.0 3.5 - 5.1 mmol/L    Chloride 108 95 - 110 mmol/L    CO2 26 23 - 29 mmol/L    Glucose 94 70 - 110 mg/dL    BUN 21 8 - 23 mg/dL    Creatinine 1.4 0.5 - 1.4 mg/dL    Calcium 8.9 8.7 - 10.5 mg/dL    Anion Gap 7 (L) 8 - 16 mmol/L    eGFR 40.7 (A) >60 mL/min/1.73 m^2   CBC with Automated Differential    Collection Time: 09/24/23  4:37 AM   Result Value Ref Range    WBC 4.11 3.90 - 12.70 K/uL    RBC 3.21 (L) 4.00 - 5.40 M/uL    Hemoglobin 8.1 (L) 12.0 - 16.0 g/dL    Hematocrit 26.7 (L) 37.0 - 48.5 %    MCV 83 82 - 98 fL    MCH 25.2 (L) 27.0 - 31.0 pg    MCHC 30.3 (L) 32.0 - 36.0 g/dL    RDW SEE COMMENT 11.5 - 14.5 %    Platelets 158 150 - 450 K/uL    MPV 10.7 9.2 - 12.9 fL    Immature Granulocytes 0.2 0.0 - 0.5 %    Gran # (ANC) 2.5 1.8 - 7.7 K/uL    Immature Grans (Abs) 0.01 0.00 - 0.04 K/uL    Lymph # 1.0 1.0 - 4.8 K/uL    Mono # 0.4 0.3 - 1.0 K/uL    Eos # 0.2 0.0 - 0.5 K/uL    Baso # 0.02 0.00 - 0.20 K/uL    nRBC 0 0 /100 WBC    Gran % 60.1 38.0 - 73.0 %    Lymph % 23.1 18.0 - 48.0 %    Mono % 10.5 4.0 - 15.0 %    Eosinophil % 5.6 0.0 - 8.0 %    Basophil % 0.5 0.0 - 1.9 %    Platelet Estimate Appears normal     Aniso Slight     Poik Slight     Ovalocytes Occasional     Differential Method Automated      Imaging Results              X-Ray Wrist Complete Right (Final  result)  Result time 09/22/23 23:52:51      Final result by Rudy Rdo MD (09/22/23 23:52:51)                   Impression:      No acute radiographic abnormality.      Electronically signed by: Rudy Rod  Date:    09/22/2023  Time:    23:52               Narrative:    EXAMINATION:  XR WRIST COMPLETE 3 VIEWS RIGHT    CLINICAL HISTORY:  Injury, unspecified, initial encounter    TECHNIQUE:  PA, lateral, and oblique views of the right wrist were performed.    COMPARISON:  None    FINDINGS:  No acute fracture, subluxation or dislocation.  No mass or foreign body.  Mild degenerative changes.                                       X-Ray Forearm Right (Final result)  Result time 09/22/23 23:51:18      Final result by Rudy Rod MD (09/22/23 23:51:18)                   Impression:      No acute radiographic abnormality.      Electronically signed by: Rudy Rod  Date:    09/22/2023  Time:    23:51               Narrative:    EXAMINATION:  XR FOREARM RIGHT    CLINICAL HISTORY:  Injury, unspecified, initial encounter    TECHNIQUE:  AP and lateral views of the right forearm were performed.    COMPARISON:  09/22/2023    FINDINGS:  No acute fracture, subluxation or dislocation.  No mass or foreign body.    No significant change.                                        CT Chest Abdomen Pelvis With Contrast (xpd) (Final result)  Result time 09/22/23 22:26:23      Final result by Joseph Ga MD (09/22/23 22:26:23)                   Impression:      1. No acute intra-abdominopelvic abnormality.  2. Spiculated 9 mm solid pulmonary nodule in the left lower lobe, suspicious for malignancy.  Recommend biopsy for definitive evaluation.  (For a solid nodule >8 mm, Fleischner Society 2017 guidelines recommend considering CT, PET/CT or tissue sampling at 3 months. )  3. Compression fractures of T8, T12 and L4, with few mm retropulsion at the T12 and L4 levels. The thoracic compression fractures appear stable since  comparison CT 08/02/2023 and 03/17/2020.  No prior imaging of the lumbar spine for comparison to assess stability.  4. Acute fracture posterior right 8th rib with mild displacement.  No pneumothorax.  5. Cholelithiasis.  6. Additional findings as above.  This report was flagged in Epic as abnormal.    Electronically signed by resident: Chris Arechiga  Date:    09/22/2023  Time:    20:41    Electronically signed by: Joseph Ga MD  Date:    09/22/2023  Time:    22:26               Narrative:    EXAMINATION:  CT CHEST ABDOMEN PELVIS WITH CONTRAST (XPD)    CLINICAL HISTORY:  Polytrauma, blunt;    TECHNIQUE:  Low dose axial images, sagittal and coronal reformations were obtained from the thoracic inlet to the pubic symphysis following the IV administration of 100 mL of Omnipaque 350.  Oral contrast was not administered.    COMPARISON:  CT chest 08/02/2023.  CT thoracic spine 03/17/2020.    FINDINGS:  THORACIC SOFT TISSUES:  No axillary or subpectoral lymphadenopathy. The visualized thyroid gland is unremarkable.    HEART & MEDIASTINUM: Heart is normal size.  No pericardial effusion.  No mediastinal or hilar lymphadenopathy.  Central pulmonary arteries are patent.    PLEURA:  Interval resolution of small bilateral pleural effusions.  No pneumothorax.    LUNGS & AIRWAYS:  Airways are patent.    Bibasilar subsegmental atelectasis.  Stable thickening of the right minor fissure with adjacent platelike atelectasis.  Spiculated 9 x 9 mm solid nodule in the left lower lobe (axial series 6, image 304), suspicious for malignancy, similar in size and appearance compared to recent prior 08/02/2023.  No consolidation.    HEPATOBILIARY:  Scattered hypodensities measuring up to 4.2 cm in the liver with fluid-level attenuation, likely cysts, similar compared to CT chest 08/02/2023.  No biliary ductal dilatation. Cholelithiasis without cholecystitis.    SPLEEN:  No splenomegaly.    PANCREAS:  No focal masses or ductal  dilatation.    ADRENALS:  No adrenal nodules.    KIDNEYS/URETERS: Kidneys are normal in size and location with symmetric enhancement.  No hydronephrosis, stones or solid mass lesions. Ureters are unremarkable.    PELVIC ORGANS/BLADDER:  Bladder is moderately distended without focal wall thickening.  Hysterectomy.  No adnexal masses.    PERITONEUM / RETROPERITONEUM:  No free air. No free fluid.    LYMPH NODES:  No lymphadenopathy.    VESSELS:  Aorta is normal in caliber.  Portal vein is patent.  IVC is unremarkable.    GI TRACT: Esophagus is unremarkable.  Stomach is unremarkable.  Small bowel is normal in caliber without inflammation or obstruction.  Normal appendix.  No colonic distension or wall thickening.    BONES AND ABDOMINAL SOFT TISSUES:  Abdominal soft tissues are unremarkable.  Right hip arthroplasty.  Diffuse osseous demineralization.  Acute fracture posterior right 8th rib with mild displacement.  Stable remote fractures of multiple posterior right ribs.  Compression fractures of T8, T12 and L4, with a few mm retropulsion at the T12 and L4 levels.  The thoracic compression fractures stable since comparison CT 08/02/2023 and 03/17/2020.  Degenerative changes of the spine.  No aggressive osseous lesions.                                        CT Maxillofacial Without Contrast (Final result)  Result time 09/22/23 20:34:15      Final result by Rudy Rod MD (09/22/23 20:34:15)                   Impression:      1. Bilateral acute nasal fractures involving the ala and nasal bridge.  Minimal displacement.  Recommend surveillance.  2. Remote traumatic changes of the mandible.  3. This report was flagged in Epic as abnormal.      Electronically signed by: Rudy Rod  Date:    09/22/2023  Time:    20:34               Narrative:    EXAMINATION:  CT MAXILLOFACIAL WITHOUT CONTRAST    CLINICAL HISTORY:  Facial trauma, blunt;    TECHNIQUE:  Low dose axial images, sagittal and coronal reformations were  obtained through the face.  Contrast was not administered.    COMPARISON:  09/05/2021    FINDINGS:  Acute nasal fractures bilaterally involving the nasal bridge and ala.  Minimal displacement.    Zygomatic arches are intact.  The orbits are intact.  No acute fractures of the mandible.  Probable remote trauma of the mandible.    Paranasal sinuses appear intact.  No significant sinus disease mastoid air cell disease.    The globes are within normal limits.    No soft tissue mass or fluid collection is detected.                                       CT Head Without Contrast (Final result)  Result time 09/22/23 20:28:49      Final result by Rudy Rod MD (09/22/23 20:28:49)                   Impression:      No acute intracranial process.      Electronically signed by: Rudy Rod  Date:    09/22/2023  Time:    20:28               Narrative:    EXAMINATION:  CT HEAD WITHOUT CONTRAST    CLINICAL HISTORY:  Facial trauma, blunt;    TECHNIQUE:  Low dose axial CT images obtained throughout the head without intravenous contrast. Sagittal and coronal reconstructions were performed.    COMPARISON:  08/01/2023    FINDINGS:  Intracranial compartment:    Ventricles and sulci are normal in size for age without evidence of hydrocephalus. No extra-axial blood or fluid collections.    Moderate involutional changes.  Mild probable chronic microvascular ischemic changes in the periventricular white matter.  No parenchymal mass, hemorrhage, edema or major vascular distribution infarct.    Skull/extracranial contents (limited evaluation): No fracture. Mastoid air cells and paranasal sinuses are essentially clear.  Scalp contusion anteriorly.                                       CT Cervical Spine Without Contrast (Final result)  Result time 09/22/23 20:40:41      Final result by Rudy Rod MD (09/22/23 20:40:41)                   Impression:      1. No acute abnormality.  2. Multilevel chronic degenerative  changes.      Electronically signed by: Rudy Rod  Date:    09/22/2023  Time:    20:40               Narrative:    EXAMINATION:  CT CERVICAL SPINE WITHOUT CONTRAST    CLINICAL HISTORY:  Neck trauma (Age >= 65y);    TECHNIQUE:  Low dose axial CT images through the cervical spine, with sagittal and coronal reformations.  Contrast was not administered.    COMPARISON:  None    FINDINGS:  No acute fractures of the cervical spine.  Alignment is satisfactory.    Mild to moderate disc space narrowing throughout the cervical spine.  Osseous demineralization.  Posterior elements are intact.  Multilevel minimal posterior disc osteophyte complex.    No significant central canal narrowing.  Mild multilevel foraminal narrowing.    Limited evaluation of the intraspinal contents demonstrates no hematoma or mass.Paraspinal soft tissues exhibit no acute abnormalities.                                       X-Ray Knee 3 View Bilateral (Final result)  Result time 09/22/23 20:43:22      Final result by Rudy Rod MD (09/22/23 20:43:22)                   Impression:      No acute radiographic abnormality bilaterally.      Electronically signed by: Rudy Rod  Date:    09/22/2023  Time:    20:43               Narrative:    EXAMINATION:  XR KNEE 3 VIEW BILATERAL    CLINICAL HISTORY:  Injury, unspecified, initial encounter    TECHNIQUE:  AP, lateral, and Merchant views of both knees were performed.    COMPARISON:  None    FINDINGS:  No acute fracture, subluxation or dislocation of the knees bilaterally.  Surgical fixation hardware noted in the distal right femur.  No significant joint effusion bilaterally.  No osseous destruction.  Joint spaces appear adequately maintained.                                       X-Ray Shoulder Trauma Bilateral (Final result)  Result time 09/22/23 20:43:38      Final result by Juan Melgar DO (09/22/23 20:43:38)                   Impression:      No acute fracture or dislocation of the  bilateral shoulders.    Acute fractures of the right 7th and 8th ribs.      Electronically signed by: Juan Melgar  Date:    09/22/2023  Time:    20:43               Narrative:    EXAMINATION:  XR SHOULDER TRAUMA 3 VIEW BILATERAL    CLINICAL HISTORY:  Injury, unspecified, initial encounter    TECHNIQUE:  Three views of each shoulder were performed.    COMPARISON:  07/11/2017.    FINDINGS:  There is osteopenia.    Right shoulder: There is no acute fracture or dislocation.  Alignment is normal.  The humeral head is well seated in the glenoid.  There is mild joint space narrowing of the acromioclavicular and glenohumeral joints.    Left shoulder: There is no acute fracture or dislocation. Alignment is normal. The humeral head is well seated in the glenoid. There is mild joint space narrowing of the acromioclavicular and glenohumeral joints.    There are mildly displaced acute fractures of the right 7th and 8th ribs, posterolateral aspects.  There are several remote left-sided rib fractures.                                       X-Ray Forearm Right (Final result)  Result time 09/22/23 20:40:47      Final result by Juan Melgar DO (09/22/23 20:40:47)                   Impression:      Posterior positioning of the distal ulna with respect to the radius, which may be due to patient positioning. Ulnar dislocation not excluded.  Correlate with point tenderness.    No acute fracture.      Electronically signed by: Juan Melgar  Date:    09/22/2023  Time:    20:40               Narrative:    EXAMINATION:  XR FOREARM RIGHT    CLINICAL HISTORY:  Injury, unspecified, initial encounter    TECHNIQUE:  AP and lateral views of the right forearm were performed.    COMPARISON:  None    FINDINGS:  There is osteopenia.  There is no acute fracture.  There is a posterior positioning of the distal ulna with respect to the radius, which may be due to patient positioning.  Ulnar dislocation not excluded.

## 2023-09-24 NOTE — SUBJECTIVE & OBJECTIVE
Interval History: NAEON. Pt is more calm , denies any complaints . Appreciate psych input     Review of Systems   Constitutional:  Negative for appetite change.   Respiratory:  Negative for cough and shortness of breath.    Cardiovascular:  Negative for chest pain and leg swelling.   Gastrointestinal:  Negative for abdominal distention, abdominal pain, constipation and diarrhea.   Genitourinary:  Negative for difficulty urinating and dysuria.   Neurological:  Negative for dizziness and headaches.   Psychiatric/Behavioral:  Positive for hallucinations.      Objective:     Vital Signs (Most Recent):  Temp: 98.1 °F (36.7 °C) (09/24/23 1125)  Pulse: 76 (09/24/23 1125)  Resp: 18 (09/24/23 1125)  BP: (!) 117/59 (09/24/23 1125)  SpO2: 99 % (09/24/23 1125) Vital Signs (24h Range):  Temp:  [98 °F (36.7 °C)-98.8 °F (37.1 °C)] 98.1 °F (36.7 °C)  Pulse:  [70-76] 76  Resp:  [16-18] 18  SpO2:  [95 %-99 %] 99 %  BP: (113-127)/(57-63) 117/59     Weight: 77.1 kg (170 lb)  Body mass index is 26.63 kg/m².    Intake/Output Summary (Last 24 hours) at 9/24/2023 1300  Last data filed at 9/24/2023 1035  Gross per 24 hour   Intake 800 ml   Output 1000 ml   Net -200 ml         Physical Exam  Constitutional:       Appearance: Normal appearance.   HENT:      Head: Normocephalic and atraumatic.      Mouth/Throat:      Mouth: Mucous membranes are moist.   Cardiovascular:      Rate and Rhythm: Normal rate and regular rhythm.      Pulses: Normal pulses.      Heart sounds: Normal heart sounds.   Pulmonary:      Effort: Pulmonary effort is normal.      Breath sounds: Normal breath sounds. No rales.   Abdominal:      General: Abdomen is flat. Bowel sounds are normal. There is no distension.      Palpations: Abdomen is soft.      Tenderness: There is no abdominal tenderness.   Musculoskeletal:         General: Normal range of motion.      Cervical back: Normal range of motion and neck supple.   Skin:     General: Skin is warm and dry.   Neurological:       General: No focal deficit present.      Mental Status: She is alert and oriented to person, place, and time.   Psychiatric:         Mood and Affect: Mood normal.             Significant Labs: All pertinent labs within the past 24 hours have been reviewed.    Significant Imaging: I have reviewed all pertinent imaging results/findings within the past 24 hours.

## 2023-09-24 NOTE — PLAN OF CARE
[facility] - Med Surg  Initial Discharge Assessment       Primary Care Provider: Srikanth Preston MD    Admission Diagnosis: Chest pain [R07.9]  Acute kidney injury [N17.9]  Blunt trauma [T14.90XA]  Medical clearance for psychiatric admission [Z00.8]  Closed fracture of nasal bone, initial encounter [S02.2XXA]    Admission Date: 9/22/2023  Expected Discharge Date: 9/25/2023    Transition of Care Barriers: Social    Payor: [employer] MANAGED MEDICARE / Plan: [employer] MEDICARE HMO / Product Type: Capitation /     Extended Emergency Contact Information  Primary Emergency Contact: Tamica Melendez  Address: [address] [place] [address]  Mobile Phone: [phone]  Relation: Daughter    Discharge Plan A: Home with family, Home Health  Discharge Plan B: Skilled Nursing Facility      [facility] [place], [place] - [address]  [address]  [facility] [address]  [facility] [address]  Phone: [phone] Fax: [phone]    [facility] - [place], [place] - [address]  [address]  [facility] [address]  Phone: [phone] Fax: [phone]    [id number] [address] AT [facility] & [address] [address]  Phone: [phone] Fax: [phone]    [facility]  [address] [facility] [address]      [facility]  [address]  [facility] [address]  Phone: [phone] Fax: [phone]      Initial Assessment (most recent)       Adult Discharge Assessment - 09/24/23 1109          Discharge Assessment    Assessment Type Discharge Planning Assessment     Confirmed/corrected address, phone number and insurance Yes     Confirmed Demographics Correct on Facesheet     Source of Information family     Does patient/caregiver understand observation status Yes     Communicated  FARAZ with patient/caregiver Yes     Reason For Admission Chest Pain     People in Home child(tarik), adult     Facility Arrived From: Home     Do you expect to return to your current living situation? Yes     Do you have help at home or someone to help you manage your care at home? Yes     Who are your caregiver(s) and their phone number(s)? [facility] Tamica Melendez      Prior to hospitilization cognitive status: Unable to Assess     Current cognitive status: Unable to Assess     Home Accessibility wheelchair accessible     Home Layout Able to live on 1st floor     Equipment Currently Used at Home CPAP     Readmission within 30 days? No     Patient currently being followed by outpatient case management? No     Do you currently have service(s) that help you manage your care at home? No     Do you take prescription medications? Yes     Do you have prescription coverage? Yes     Do you have any problems affording any of your prescribed medications? No     Is the patient taking medications as prescribed? yes     Who is going to help you get home at discharge? Hospital     How do you get to doctors appointments? car, drives self;public transportation     Are you on dialysis? No     Do you take coumadin? No     DME Needed Upon Discharge  other (see comments)   TBD    Discharge Plan discussed with: Adult children     Transition of Care Barriers Social     Discharge Plan A Home with family;Home Health     Discharge Plan B Skilled Nursing Facility        Physical Activity    On average, how many days per week do you engage in moderate to strenuous exercise (like a brisk walk)? 1 day     On average, how many minutes do you engage in exercise at this level? 20 min        Financial Resource Strain    How hard is it for you to pay for the very basics like food, housing, medical care, and heating? Not very hard        Housing Stability    In the last 12 months, was there a time when you were not able to pay the mortgage or rent  on time? No     In the last 12 months, how many places have you lived? 1     In the last 12 months, was there a time when you did not have a steady place to sleep or slept in a shelter (including now)? No        Transportation Needs    In the past 12 months, has lack of transportation kept you from medical appointments or from getting medications? No     In the past 12 months, has lack of transportation kept you from meetings, work, or from getting things needed for daily living? No        Food Insecurity    Within the past 12 months, you worried that your food would run out before you got the money to buy more. Sometimes true     Within the past 12 months, the food you bought just didn't last and you didn't have money to get more. Sometimes true        Stress    Do you feel stress - tense, restless, nervous, or anxious, or unable to sleep at night because your mind is troubled all the time - these days? To some extent        Social Connections    In a typical week, how many times do you talk on the phone with family, friends, or neighbors? Once a week     How often do you get together with friends or relatives? Once a week     How often do you attend [employer] or [employer] services? Never     Do you belong to any clubs or organizations such as [employer] groups, unions, fraternal or athletic groups, or school groups? No     How often do you attend meetings of the clubs or organizations you belong to? Never     Are you , , , , never , or living with a partner?         Alcohol Use    Q1: How often do you have a drink containing alcohol? Never     Q2: How many drinks containing alcohol do you have on a typical day when you are drinking? Patient does not drink     Q3: How often do you have six or more drinks on one occasion? Never        OTHER    Name(s) of People in Home Tamica beach

## 2023-09-24 NOTE — ASSESSMENT & PLAN NOTE
Patient with acute kidney injury/acute renal failure likely due to acute tubular necrosis caused by dehydration / nsaids JESSICA is currently worsening. Baseline creatinine 1.1 - Labs reviewed- Renal function/electrolytes with Estimated Creatinine Clearance: 40.6 mL/min (based on SCr of 1.4 mg/dL). according to latest data. Monitor urine output and serial BMP and adjust therapy as needed. Avoid nephrotoxins and renally dose meds for GFR listed above. Improving

## 2023-09-24 NOTE — PLAN OF CARE
Sw placed call to [facility] at  to complete dc assessment, Sw left  to have [facility] to have her return Sw's call and left Sw's contact information. Sw to follow.

## 2023-09-24 NOTE — PLAN OF CARE
Problem: Adult Inpatient Plan of Care  Goal: Plan of Care Review  Outcome: Ongoing, Not Progressing  Goal: Patient-Specific Goal (Individualized)  Outcome: Ongoing, Not Progressing  Goal: Absence of Hospital-Acquired Illness or Injury  Outcome: Ongoing, Not Progressing  Goal: Optimal Comfort and Wellbeing  Outcome: Ongoing, Not Progressing  Goal: Readiness for Transition of Care  Outcome: Ongoing, Not Progressing     Problem: Infection  Goal: Absence of Infection Signs and Symptoms  Outcome: Ongoing, Not Progressing     Problem: Fluid and Electrolyte Imbalance (Acute Kidney Injury/Impairment)  Goal: Fluid and Electrolyte Balance  Outcome: Ongoing, Not Progressing     Problem: Oral Intake Inadequate (Acute Kidney Injury/Impairment)  Goal: Optimal Nutrition Intake  Outcome: Ongoing, Not Progressing     Problem: Renal Function Impairment (Acute Kidney Injury/Impairment)  Goal: Effective Renal Function  Outcome: Ongoing, Not Progressing     Problem: Skin Injury Risk Increased  Goal: Skin Health and Integrity  Outcome: Ongoing, Not Progressing     Problem: Fall Injury Risk  Goal: Absence of Fall and Fall-Related Injury  Outcome: Ongoing, Not Progressing     Problem: Behavior Regulation Impairment (Psychotic Signs/Symptoms)  Goal: Improved Behavioral Control (Psychotic Signs/Symptoms)  Outcome: Ongoing, Not Progressing     Problem: Cognitive Impairment (Psychotic Signs/Symptoms)  Goal: Optimal Cognitive Function (Psychotic Signs/Symptoms)  Outcome: Ongoing, Not Progressing     Problem: Decreased Participation and Engagement (Psychotic Signs/Symptoms)  Goal: Increased Participation and Engagement (Psychotic Signs/Symptoms)  Outcome: Ongoing, Not Progressing     Problem: Mood Impairment (Psychotic Signs/Symptoms)  Goal: Improved Mood Symptoms (Psychotic Signs/Symptoms)  Outcome: Ongoing, Not Progressing     Problem: Psychomotor Impairment (Psychotic Signs/Symptoms)  Goal: Improved Psychomotor Symptoms (Psychotic  Signs/Symptoms)  Outcome: Ongoing, Not Progressing     Problem: Sensory Perception Impairment (Psychotic Signs/Symptoms)  Goal: Decreased Sensory Symptoms (Psychotic Signs/Symptoms)  Outcome: Ongoing, Not Progressing     Problem: Sleep Disturbance (Psychotic Signs/Symptoms)  Goal: Improved Sleep (Psychotic Signs/Symptoms)  Outcome: Ongoing, Not Progressing     Problem: Social, Occupational or Functional Impairment (Psychotic Signs/Symptoms)  Goal: Enhanced Social, Occupational or Functional Skills (Psychotic Signs/Symptoms)  Outcome: Ongoing, Not Progressing

## 2023-09-24 NOTE — PROGRESS NOTES
[facility]  Progress Note    Patient Name: Elly Harris  MRN: [id number]  Patient Class: OP- Observation   Admission Date: 9/22/2023  Length of Stay: 0 days  Attending Physician: Juliocesar Cutler MD  Primary Care Provider: Srikanth Preston MD        Subjective:     Principal Problem:Acute kidney injury        HPI:  Patient is a 69yo female with a PMHx of bipolar disorder being admitted to observation for hallucinations.  She also has auditory hallucinations. She is supposed to be on aripiprazole, bupropion, duloxetine, lamotrigine, olanzapine. She has been out of 1 of her medications due to financial issues and them not authorizing it, she lost her sample prescription. She reports auditory and visual hallucinations, responding to internal stimuli. No SI or HI. Multiple bruising from recent trauma, stating that fibromyalgia and moving with her walker she often falls. Today, she slipped in the bathroom and fell.  She hit her head. No LOC. She has had multiple prior falls. Her falls seem to be non syncopal in nature, stating that occasionally he was because she was transferring from walking with her walker to not, or when she slips on slippery floor. She also has chronic fibromyalgia was difficulty with ambulation so she is constantly using a walker.    In the ED, vitals stable. Intake labs remarkable for Cr 1.8, UA with hyaline casts and 13 WBCs. CT chest with spiculated 9 mm solid pulmonary nodule in the left lower lobe, suspicious for malignancy, Compression fractures of T8, T12 and L4, with few mm retropulsion at the T12 and L4 levels, Acute fracture posterior right 8th rib with mild displacement. CT max with bilateral acute nasal fractures involving the ala and nasal bridge. She was PECd in the ED for grave disability and admitted for psych placement and medical clearance.      Overview/Hospital Course:  Pt was admitted for hallucination and JESSICA. Her kidney function is back to normal.  Seen by psych , her meds adjusted . Feeling better       Interval History: NAEON. Pt is more calm , denies any complaints . Appreciate psych input     Review of Systems   Constitutional:  Negative for appetite change.   Respiratory:  Negative for cough and shortness of breath.    Cardiovascular:  Negative for chest pain and leg swelling.   Gastrointestinal:  Negative for abdominal distention, abdominal pain, constipation and diarrhea.   Genitourinary:  Negative for difficulty urinating and dysuria.   Neurological:  Negative for dizziness and headaches.   Psychiatric/Behavioral:  Positive for hallucinations.      Objective:     Vital Signs (Most Recent):  Temp: 98.1 °F (36.7 °C) (09/24/23 1125)  Pulse: 76 (09/24/23 1125)  Resp: 18 (09/24/23 1125)  BP: (!) 117/59 (09/24/23 1125)  SpO2: 99 % (09/24/23 1125) Vital Signs (24h Range):  Temp:  [98 °F (36.7 °C)-98.8 °F (37.1 °C)] 98.1 °F (36.7 °C)  Pulse:  [70-76] 76  Resp:  [16-18] 18  SpO2:  [95 %-99 %] 99 %  BP: (113-127)/(57-63) 117/59     Weight: 77.1 kg (170 lb)  Body mass index is 26.63 kg/m².    Intake/Output Summary (Last 24 hours) at 9/24/2023 1300  Last data filed at 9/24/2023 1035  Gross per 24 hour   Intake 800 ml   Output 1000 ml   Net -200 ml         Physical Exam  Constitutional:       Appearance: Normal appearance.   HENT:      Head: Normocephalic and atraumatic.      Mouth/Throat:      Mouth: Mucous membranes are moist.   Cardiovascular:      Rate and Rhythm: Normal rate and regular rhythm.      Pulses: Normal pulses.      Heart sounds: Normal heart sounds.   Pulmonary:      Effort: Pulmonary effort is normal.      Breath sounds: Normal breath sounds. No rales.   Abdominal:      General: Abdomen is flat. Bowel sounds are normal. There is no distension.      Palpations: Abdomen is soft.      Tenderness: There is no abdominal tenderness.   Musculoskeletal:         General: Normal range of motion.      Cervical back: Normal range of motion and neck supple.    Skin:     General: Skin is warm and dry.   Neurological:      General: No focal deficit present.      Mental Status: She is alert and oriented to person, place, and time.   Psychiatric:         Mood and Affect: Mood normal.             Significant Labs: All pertinent labs within the past 24 hours have been reviewed.    Significant Imaging: I have reviewed all pertinent imaging results/findings within the past 24 hours.      Assessment/Plan:      * Acute kidney injury  Patient with acute kidney injury/acute renal failure likely due to acute tubular necrosis caused by dehydration / nsaids JESSICA is currently worsening. Baseline creatinine 1.1 - Labs reviewed- Renal function/electrolytes with Estimated Creatinine Clearance: 40.6 mL/min (based on SCr of 1.4 mg/dL). according to latest data. Monitor urine output and serial BMP and adjust therapy as needed. Avoid nephrotoxins and renally dose meds for GFR listed above. Improving     Essential hypertension  - metoprolol 100mg daily    Bipolar 2 disorder        Psychosis  Bipolar 2  - psych consulted for recs  - meds adjusted        VTE Risk Mitigation (From admission, onward)         Ordered     IP VTE LOW RISK PATIENT  Once         09/23/23 0014     Place sequential compression device  Until discontinued         09/23/23 0014                Discharge Planning   FARAZ: 9/25/2023     Code Status: Full Code   Is the patient medically ready for discharge?:     Reason for patient still in hospital (select all that apply): Treatment and Pending disposition  Discharge Plan A: Home with family, Home Health                  Juliocesar Cutler MD  Department of Hospital Medicine   [facility] S[facility]

## 2023-09-25 VITALS
OXYGEN SATURATION: 94 % | WEIGHT: 170 LBS | HEART RATE: 68 BPM | TEMPERATURE: 98 F | HEIGHT: 67 IN | SYSTOLIC BLOOD PRESSURE: 134 MMHG | RESPIRATION RATE: 18 BRPM | BODY MASS INDEX: 26.68 KG/M2 | DIASTOLIC BLOOD PRESSURE: 60 MMHG

## 2023-09-25 PROBLEM — N30.00 ACUTE CYSTITIS WITHOUT HEMATURIA: Status: ACTIVE | Noted: 2023-09-25

## 2023-09-25 LAB
ANION GAP SERPL CALC-SCNC: 5 MMOL/L (ref 8–16)
BASOPHILS # BLD AUTO: 0.03 K/UL (ref 0–0.2)
BASOPHILS NFR BLD: 0.7 % (ref 0–1.9)
BUN SERPL-MCNC: 27 MG/DL (ref 8–23)
CALCIUM SERPL-MCNC: 8.9 MG/DL (ref 8.7–10.5)
CHLORIDE SERPL-SCNC: 106 MMOL/L (ref 95–110)
CO2 SERPL-SCNC: 28 MMOL/L (ref 23–29)
CREAT SERPL-MCNC: 1.4 MG/DL (ref 0.5–1.4)
DIFFERENTIAL METHOD: ABNORMAL
EOSINOPHIL # BLD AUTO: 0.2 K/UL (ref 0–0.5)
EOSINOPHIL NFR BLD: 4.8 % (ref 0–8)
ERYTHROCYTE [DISTWIDTH] IN BLOOD BY AUTOMATED COUNT: ABNORMAL % (ref 11.5–14.5)
EST. GFR  (NO RACE VARIABLE): 40.7 ML/MIN/1.73 M^2
GLUCOSE SERPL-MCNC: 89 MG/DL (ref 70–110)
HCT VFR BLD AUTO: 29.7 % (ref 37–48.5)
HGB BLD-MCNC: 8.6 G/DL (ref 12–16)
IMM GRANULOCYTES # BLD AUTO: 0.02 K/UL (ref 0–0.04)
IMM GRANULOCYTES NFR BLD AUTO: 0.4 % (ref 0–0.5)
LYMPHOCYTES # BLD AUTO: 1 K/UL (ref 1–4.8)
LYMPHOCYTES NFR BLD: 21.4 % (ref 18–48)
MCH RBC QN AUTO: 25.7 PG (ref 27–31)
MCHC RBC AUTO-ENTMCNC: 29 G/DL (ref 32–36)
MCV RBC AUTO: 89 FL (ref 82–98)
MONOCYTES # BLD AUTO: 0.5 K/UL (ref 0.3–1)
MONOCYTES NFR BLD: 10.3 % (ref 4–15)
NEUTROPHILS # BLD AUTO: 2.9 K/UL (ref 1.8–7.7)
NEUTROPHILS NFR BLD: 62.4 % (ref 38–73)
NRBC BLD-RTO: 0 /100 WBC
PLATELET # BLD AUTO: 157 K/UL (ref 150–450)
PMV BLD AUTO: 11 FL (ref 9.2–12.9)
POTASSIUM SERPL-SCNC: 4.2 MMOL/L (ref 3.5–5.1)
RBC # BLD AUTO: 3.34 M/UL (ref 4–5.4)
SODIUM SERPL-SCNC: 139 MMOL/L (ref 136–145)
WBC # BLD AUTO: 4.58 K/UL (ref 3.9–12.7)

## 2023-09-25 PROCEDURE — 85025 COMPLETE CBC W/AUTO DIFF WBC: CPT | Mod: HCNC | Performed by: PHYSICIAN ASSISTANT

## 2023-09-25 PROCEDURE — 25000003 PHARM REV CODE 250: Mod: HCNC | Performed by: PHYSICIAN ASSISTANT

## 2023-09-25 PROCEDURE — G0378 HOSPITAL OBSERVATION PER HR: HCPCS | Mod: HCNC

## 2023-09-25 PROCEDURE — 25000003 PHARM REV CODE 250: Mod: HCNC | Performed by: INTERNAL MEDICINE

## 2023-09-25 PROCEDURE — 99214 OFFICE O/P EST MOD 30 MIN: CPT | Mod: HCNC,,,

## 2023-09-25 PROCEDURE — 99214 PR OFFICE/OUTPT VISIT, EST, LEVL IV, 30-39 MIN: ICD-10-PCS | Mod: HCNC,,,

## 2023-09-25 PROCEDURE — 36415 COLL VENOUS BLD VENIPUNCTURE: CPT | Mod: HCNC | Performed by: PHYSICIAN ASSISTANT

## 2023-09-25 PROCEDURE — 80048 BASIC METABOLIC PNL TOTAL CA: CPT | Mod: HCNC | Performed by: PHYSICIAN ASSISTANT

## 2023-09-25 RX ORDER — DULOXETIN HYDROCHLORIDE 60 MG/1
60 CAPSULE, DELAYED RELEASE ORAL DAILY
Qty: 30 CAPSULE | Refills: 0 | Status: SHIPPED | OUTPATIENT
Start: 2023-09-25

## 2023-09-25 RX ORDER — ARIPIPRAZOLE 15 MG/1
15 TABLET ORAL DAILY
Qty: 30 TABLET | Refills: 0 | Status: SHIPPED | OUTPATIENT
Start: 2023-09-25

## 2023-09-25 RX ORDER — METOPROLOL SUCCINATE 100 MG/1
TABLET, EXTENDED RELEASE ORAL
Qty: 90 TABLET | Refills: 0 | OUTPATIENT
Start: 2023-09-25

## 2023-09-25 RX ORDER — CEFPODOXIME PROXETIL 100 MG/1
100 TABLET, FILM COATED ORAL 2 TIMES DAILY
Qty: 4 TABLET | Refills: 0 | Status: SHIPPED | OUTPATIENT
Start: 2023-09-26 | End: 2023-09-25 | Stop reason: SDUPTHER

## 2023-09-25 RX ORDER — DULOXETIN HYDROCHLORIDE 30 MG/1
30 CAPSULE, DELAYED RELEASE ORAL NIGHTLY
Qty: 30 CAPSULE | Refills: 0 | Status: SHIPPED | OUTPATIENT
Start: 2023-09-25

## 2023-09-25 RX ORDER — METOPROLOL SUCCINATE 100 MG/1
100 TABLET, EXTENDED RELEASE ORAL DAILY
Qty: 30 TABLET | Refills: 0 | Status: SHIPPED | OUTPATIENT
Start: 2023-09-25 | End: 2023-09-25 | Stop reason: SDUPTHER

## 2023-09-25 RX ORDER — CEFPODOXIME PROXETIL 100 MG/1
100 TABLET, FILM COATED ORAL 2 TIMES DAILY
Qty: 4 TABLET | Refills: 0 | Status: SHIPPED | OUTPATIENT
Start: 2023-09-26 | End: 2023-09-28

## 2023-09-25 RX ORDER — DULOXETIN HYDROCHLORIDE 60 MG/1
60 CAPSULE, DELAYED RELEASE ORAL DAILY
Qty: 30 CAPSULE | Refills: 0 | Status: SHIPPED | OUTPATIENT
Start: 2023-09-25 | End: 2023-09-25 | Stop reason: SDUPTHER

## 2023-09-25 RX ORDER — LAMOTRIGINE 200 MG/1
200 TABLET ORAL 2 TIMES DAILY
Qty: 60 TABLET | Refills: 0 | Status: SHIPPED | OUTPATIENT
Start: 2023-09-25 | End: 2023-09-25 | Stop reason: SDUPTHER

## 2023-09-25 RX ORDER — LAMOTRIGINE 200 MG/1
200 TABLET ORAL 2 TIMES DAILY
Qty: 60 TABLET | Refills: 0 | Status: SHIPPED | OUTPATIENT
Start: 2023-09-25

## 2023-09-25 RX ORDER — DULOXETIN HYDROCHLORIDE 30 MG/1
30 CAPSULE, DELAYED RELEASE ORAL NIGHTLY
Qty: 30 CAPSULE | Refills: 0 | Status: SHIPPED | OUTPATIENT
Start: 2023-09-25 | End: 2023-09-25 | Stop reason: SDUPTHER

## 2023-09-25 RX ORDER — ARIPIPRAZOLE 15 MG/1
15 TABLET ORAL DAILY
Qty: 30 TABLET | Refills: 0 | Status: SHIPPED | OUTPATIENT
Start: 2023-09-25 | End: 2023-09-25 | Stop reason: SDUPTHER

## 2023-09-25 RX ORDER — METOPROLOL SUCCINATE 100 MG/1
100 TABLET, EXTENDED RELEASE ORAL DAILY
Qty: 30 TABLET | Refills: 0 | Status: SHIPPED | OUTPATIENT
Start: 2023-09-25

## 2023-09-25 RX ADMIN — DULOXETINE HYDROCHLORIDE 60 MG: 60 CAPSULE, DELAYED RELEASE ORAL at 06:09

## 2023-09-25 RX ADMIN — ACETAMINOPHEN 1000 MG: 500 TABLET ORAL at 01:09

## 2023-09-25 RX ADMIN — LAMOTRIGINE 200 MG: 100 TABLET ORAL at 08:09

## 2023-09-25 RX ADMIN — METOPROLOL SUCCINATE 100 MG: 100 TABLET, EXTENDED RELEASE ORAL at 08:09

## 2023-09-25 RX ADMIN — HYDROCODONE BITARTRATE AND ACETAMINOPHEN 1 TABLET: 7.5; 325 TABLET ORAL at 05:09

## 2023-09-25 RX ADMIN — ARIPIPRAZOLE 15 MG: 10 TABLET ORAL at 08:09

## 2023-09-25 NOTE — PROGRESS NOTES
"CONSULTATION-LIAISON PSYCHIATRY PROGRESS NOTE    Patient Name: Elly Harris  MRN: [id number]  Patient Class: OP- Observation  Admission Date: 9/22/2023  Attending Physician: Jorge Phillips MD      SUBJECTIVE:   Elly Harris is a 68 y.o. female with past psychiatric history of Unspecified Bipolar Disorder & past pertinent medical history of osteoporosis, KEVIN, multiple prior falls presents to the ED/admitted to the hospital for Acute kidney injury. Initially presented for hallucinations of +VH of bugs crawling on body & in home, seeing people; pt notably unkempt w/ fecal matter on clothing in ED. Notable work-ups in ED include: spiculated 9mm solid pulm nodule in LLL suspicious for malignancy, compression fractures at T8/T12/L4 w/ acute fx @ 8th rib (R posterior), bilateral acute nasal fx w/ scalp contusion, UDS+ benzo (per , last 30 day supply of Ativan 0.5mg daily filled on 9/1/23)     Psychiatry consulted for "PECd, grave disability" (signed 9/22 @1813hrs)    Patient with no acute events overnight. Did not require any behavioral PRNs and adherent to scheduled psych meds. On today's psych eval, alert and oriented x4, calm and cooperative. She continues to report baseline auditory hallucinations as well as visual hallucinations of "graffiti on the wall." Contrary to yesterday's interview she reports the AVHs are distressing to her because of the "group of people" they are associated with. She states they are members of a "cult or something like that." She reports feeling safe in the hospital but is unsure if she will feel safe at home. She reports she lives with her brother and daughter. She otherwise has no other objective concerns of psychosis or fransico, other than unclear story of "people being in her house" which she states multiple times, "may have actually been a dream."  She also tells a confusing story of someone sewing chips into her wallet, mentions "people" knowing where she lives because of "the GPS " "I guess" and making it known she does not have chips implanted in her brain. She denies feeling concerned that these "people" may hurt her. She denies SI/HI or paranoia or confusion. Reports sleeping and eating well. She reports intent to continue staying in hospital until her medical issues have been completely treated. Otherwise no further complaints.      OBJECTIVE:    Mental Status Exam:  General Appearance: appears stated age, well developed and nourished, adequately groomed and appropriately dressed, in no acute distress  Behavior: normal; cooperative; reasonably friendly, pleasant, and polite; appropriate eye-contact; under good behavioral control  Involuntary Movements and Motor Activity: no abnormal involuntary movements noted; no tics, no tremors, no akathisia, no dystonia, no evidence of tardive dyskinesia; no psychomotor agitation or retardation  Gait and Station: unable to assess - patient lying down or seated  Speech and Language: intact; normal rate, rhythm, volume, tone, and pitch; conversational, spontaneous, and coherent; speaks and understands English proficiently and fluently; repeats words and phrases, no word finding difficulties are noted  Mood: "ok"  Affect: normal, euthymic, reactive, full-range, mood-congruent, appropriate to situation and context  Thought Process and Associations:  patient linear and organized throughout interview. Only with mild disorganized thought/questionable delusions when referring to episode "which she think might have been a dream.'  Thought Content and Perceptions:: no suicidal ideation, no homicidal ideation, positive auditory hallucinations unchanged from baseline (hearing music), positive visual hallucinations of "graffiti on wall."   Sensorium and Orientation: intact; alert with clear sensorium; oriented fully to person, place, time and situation  Recent and Remote Memory: grossly intact  Attention and Concentration: grossly intact  Fund of Knowledge: grossly " intact  Insight: adequate  Judgment: adequate    CAM ICU positive? no      ASSESSMENT & RECOMMENDATIONS   Unspecified Bipolar Disorder  Benzodiazepine use disorder     Continue home meds:  Duloxetine 60 mg/30 mg AM/PM  Rec increasing Abilify to 20 mg daily  Lamotrigine 200 mg BID  UDS+ benzo. Prescribed Ativan 0.5mg daily PRN for anxiety. Since the beginning of this month, pt stated taking 2-3x Ativans per day, implying previous non-daily use of monthly prescriptions that allowed more than rx'd use on tail-end.  Provide education/encouragement to wean-off benzo use and recommended Vistaril for anxiety during hospital stay and home  PRN Zyprexa 5mg PO/IM for verbally non-redirectable agitation.  9/22/23 EKG nsr w/ QTc 421ms     DELIRIUM  DELIRIUM BEHAVIOR MANAGEMENT  PLEASE utilize CHEMICAL restraints with PRN meds first for agitation. Minimize use of PHYSICAL restraints OR have periods of being out of physical restraints if possible.  Keep window shades open and room lit during day and room dim at night in order to promote normal sleep-wake cycles  Encourage family at bedside. [place] patient often to situation, location, date.  Continue to Limit or Discontinue use of Narcotics, Benzos and Anti-cholinergic medications as they may worsen delirium.  Continue medical workup for causative etiology of Delirium.      OTHER PERTINENT DIAGNOSIS     RISK ASSESSMENT  RECOMMEND RESCIND PEC because patient is not in imminent danger of hurting self or others and is no longer gravely disabled. She has voiced intent to stay in hospital until completion of medical treatment.      FOLLOW UP  Will follow up while in house     DISPOSITION - once medically cleared:   Defer to medical team    Please contact ON CALL psychiatry service (24/7) for any acute issues that may arise.      Martín Hinojosa, PMHNP [facility]  Psychiatry      --------------------------------------------------------------------------------------------------------------------------------------------------------------------------------------------------------------------------------------    CONTINUED OBJECTIVE clinical data & findings reviewed and noted for above decision-making    Current Medications:   Scheduled Meds:    ARIPiprazole  15 mg Oral Daily    cefTRIAXone (ROCEPHIN) IVPB  1 g Intravenous Q24H    DULoxetine  30 mg Oral QHS    DULoxetine  60 mg Oral QAM    lamoTRIgine  200 mg Oral BID    metoprolol succinate  100 mg Oral Daily     PRN Meds: acetaminophen, acetaminophen, aluminum-magnesium hydroxide-simethicone, bisacodyL, HYDROcodone-acetaminophen, melatonin, naloxone, ondansetron, prochlorperazine, sodium chloride 0.9%, DIPH,PERTUSS(ACELL),TET VACCINE (ADULT)(BOOSTRIX,ADACEL)    Allergies:   Review of patient's allergies indicates:   Allergen Reactions    Doxycycline Rash and Hives       Vitals  Vitals:    09/25/23 0820   BP: (!) 158/71   Pulse:    Resp:    Temp:        Labs/Imaging/Studies:  Recent Results (from the past 24 hour(s))   Basic Metabolic Panel (BMP)    Collection Time: 09/25/23  4:57 AM   Result Value Ref Range    Sodium 139 136 - 145 mmol/L    Potassium 4.2 3.5 - 5.1 mmol/L    Chloride 106 95 - 110 mmol/L    CO2 28 23 - 29 mmol/L    Glucose 89 70 - 110 mg/dL    BUN 27 (H) 8 - 23 mg/dL    Creatinine 1.4 0.5 - 1.4 mg/dL    Calcium 8.9 8.7 - 10.5 mg/dL    Anion Gap 5 (L) 8 - 16 mmol/L    eGFR 40.7 (A) >60 mL/min/1.73 m^2   CBC with Automated Differential    Collection Time: 09/25/23  4:57 AM   Result Value Ref Range    WBC 4.58 3.90 - 12.70 K/uL    RBC 3.34 (L) 4.00 - 5.40 M/uL    Hemoglobin 8.6 (L) 12.0 - 16.0 g/dL    Hematocrit 29.7 (L) 37.0 - 48.5 %    MCV 89 82 - 98 fL    MCH 25.7 (L) 27.0 - 31.0 pg    MCHC 29.0 (L) 32.0 - 36.0 g/dL    RDW SEE COMMENT 11.5 - 14.5 %    Platelets 157 150 - 450 K/uL    MPV 11.0 9.2 - 12.9 fL     Immature Granulocytes 0.4 0.0 - 0.5 %    Gran # (ANC) 2.9 1.8 - 7.7 K/uL    Immature Grans (Abs) 0.02 0.00 - 0.04 K/uL    Lymph # 1.0 1.0 - 4.8 K/uL    Mono # 0.5 0.3 - 1.0 K/uL    Eos # 0.2 0.0 - 0.5 K/uL    Baso # 0.03 0.00 - 0.20 K/uL    nRBC 0 0 /100 WBC    Gran % 62.4 38.0 - 73.0 %    Lymph % 21.4 18.0 - 48.0 %    Mono % 10.3 4.0 - 15.0 %    Eosinophil % 4.8 0.0 - 8.0 %    Basophil % 0.7 0.0 - 1.9 %    Differential Method Automated      Imaging Results              X-Ray Wrist Complete Right (Final result)  Result time 09/22/23 23:52:51      Final result by Rudy Rod MD (09/22/23 23:52:51)                   Impression:      No acute radiographic abnormality.      Electronically signed by: Rudy Rod  Date:    09/22/2023  Time:    23:52               Narrative:    EXAMINATION:  XR WRIST COMPLETE 3 VIEWS RIGHT    CLINICAL HISTORY:  Injury, unspecified, initial encounter    TECHNIQUE:  PA, lateral, and oblique views of the right wrist were performed.    COMPARISON:  None    FINDINGS:  No acute fracture, subluxation or dislocation.  No mass or foreign body.  Mild degenerative changes.                                       X-Ray Forearm Right (Final result)  Result time 09/22/23 23:51:18      Final result by Rudy Rod MD (09/22/23 23:51:18)                   Impression:      No acute radiographic abnormality.      Electronically signed by: Rudy Rod  Date:    09/22/2023  Time:    23:51               Narrative:    EXAMINATION:  XR FOREARM RIGHT    CLINICAL HISTORY:  Injury, unspecified, initial encounter    TECHNIQUE:  AP and lateral views of the right forearm were performed.    COMPARISON:  09/22/2023    FINDINGS:  No acute fracture, subluxation or dislocation.  No mass or foreign body.    No significant change.                                        CT Chest Abdomen Pelvis With Contrast (xpd) (Final result)  Result time 09/22/23 22:26:23      Final result by Joseph Ga MD (09/22/23  22:26:23)                   Impression:      1. No acute intra-abdominopelvic abnormality.  2. Spiculated 9 mm solid pulmonary nodule in the left lower lobe, suspicious for malignancy.  Recommend biopsy for definitive evaluation.  (For a solid nodule >8 mm, Fleischner Society 2017 guidelines recommend considering CT, PET/CT or tissue sampling at 3 months. )  3. Compression fractures of T8, T12 and L4, with few mm retropulsion at the T12 and L4 levels. The thoracic compression fractures appear stable since comparison CT 08/02/2023 and 03/17/2020.  No prior imaging of the lumbar spine for comparison to assess stability.  4. Acute fracture posterior right 8th rib with mild displacement.  No pneumothorax.  5. Cholelithiasis.  6. Additional findings as above.  This report was flagged in Epic as abnormal.    Electronically signed by resident: Chris Arechiga  Date:    09/22/2023  Time:    20:41    Electronically signed by: Joseph Ga MD  Date:    09/22/2023  Time:    22:26               Narrative:    EXAMINATION:  CT CHEST ABDOMEN PELVIS WITH CONTRAST (XPD)    CLINICAL HISTORY:  Polytrauma, blunt;    TECHNIQUE:  Low dose axial images, sagittal and coronal reformations were obtained from the thoracic inlet to the pubic symphysis following the IV administration of 100 mL of Omnipaque 350.  Oral contrast was not administered.    COMPARISON:  CT chest 08/02/2023.  CT thoracic spine 03/17/2020.    FINDINGS:  THORACIC SOFT TISSUES:  No axillary or subpectoral lymphadenopathy. The visualized thyroid gland is unremarkable.    HEART & MEDIASTINUM: Heart is normal size.  No pericardial effusion.  No mediastinal or hilar lymphadenopathy.  Central pulmonary arteries are patent.    PLEURA:  Interval resolution of small bilateral pleural effusions.  No pneumothorax.    LUNGS & AIRWAYS:  Airways are patent.    Bibasilar subsegmental atelectasis.  Stable thickening of the right minor fissure with adjacent platelike atelectasis.   Spiculated 9 x 9 mm solid nodule in the left lower lobe (axial series 6, image 304), suspicious for malignancy, similar in size and appearance compared to recent prior 08/02/2023.  No consolidation.    HEPATOBILIARY:  Scattered hypodensities measuring up to 4.2 cm in the liver with fluid-level attenuation, likely cysts, similar compared to CT chest 08/02/2023.  No biliary ductal dilatation. Cholelithiasis without cholecystitis.    SPLEEN:  No splenomegaly.    PANCREAS:  No focal masses or ductal dilatation.    ADRENALS:  No adrenal nodules.    KIDNEYS/URETERS: Kidneys are normal in size and location with symmetric enhancement.  No hydronephrosis, stones or solid mass lesions. Ureters are unremarkable.    PELVIC ORGANS/BLADDER:  Bladder is moderately distended without focal wall thickening.  Hysterectomy.  No adnexal masses.    PERITONEUM / RETROPERITONEUM:  No free air. No free fluid.    LYMPH NODES:  No lymphadenopathy.    VESSELS:  Aorta is normal in caliber.  Portal vein is patent.  IVC is unremarkable.    GI TRACT: Esophagus is unremarkable.  Stomach is unremarkable.  Small bowel is normal in caliber without inflammation or obstruction.  Normal appendix.  No colonic distension or wall thickening.    BONES AND ABDOMINAL SOFT TISSUES:  Abdominal soft tissues are unremarkable.  Right hip arthroplasty.  Diffuse osseous demineralization.  Acute fracture posterior right 8th rib with mild displacement.  Stable remote fractures of multiple posterior right ribs.  Compression fractures of T8, T12 and L4, with a few mm retropulsion at the T12 and L4 levels.  The thoracic compression fractures stable since comparison CT 08/02/2023 and 03/17/2020.  Degenerative changes of the spine.  No aggressive osseous lesions.                                        CT Maxillofacial Without Contrast (Final result)  Result time 09/22/23 20:34:15      Final result by Rudy Rod MD (09/22/23 20:34:15)                   Impression:       1. Bilateral acute nasal fractures involving the ala and nasal bridge.  Minimal displacement.  Recommend surveillance.  2. Remote traumatic changes of the mandible.  3. This report was flagged in Epic as abnormal.      Electronically signed by: Rudy Rod  Date:    09/22/2023  Time:    20:34               Narrative:    EXAMINATION:  CT MAXILLOFACIAL WITHOUT CONTRAST    CLINICAL HISTORY:  Facial trauma, blunt;    TECHNIQUE:  Low dose axial images, sagittal and coronal reformations were obtained through the face.  Contrast was not administered.    COMPARISON:  09/05/2021    FINDINGS:  Acute nasal fractures bilaterally involving the nasal bridge and ala.  Minimal displacement.    Zygomatic arches are intact.  The orbits are intact.  No acute fractures of the mandible.  Probable remote trauma of the mandible.    Paranasal sinuses appear intact.  No significant sinus disease mastoid air cell disease.    The globes are within normal limits.    No soft tissue mass or fluid collection is detected.                                       CT Head Without Contrast (Final result)  Result time 09/22/23 20:28:49      Final result by Rudy Rod MD (09/22/23 20:28:49)                   Impression:      No acute intracranial process.      Electronically signed by: Rudy Rod  Date:    09/22/2023  Time:    20:28               Narrative:    EXAMINATION:  CT HEAD WITHOUT CONTRAST    CLINICAL HISTORY:  Facial trauma, blunt;    TECHNIQUE:  Low dose axial CT images obtained throughout the head without intravenous contrast. Sagittal and coronal reconstructions were performed.    COMPARISON:  08/01/2023    FINDINGS:  Intracranial compartment:    Ventricles and sulci are normal in size for age without evidence of hydrocephalus. No extra-axial blood or fluid collections.    Moderate involutional changes.  Mild probable chronic microvascular ischemic changes in the periventricular white matter.  No parenchymal mass, hemorrhage,  edema or major vascular distribution infarct.    Skull/extracranial contents (limited evaluation): No fracture. Mastoid air cells and paranasal sinuses are essentially clear.  Scalp contusion anteriorly.                                       CT Cervical Spine Without Contrast (Final result)  Result time 09/22/23 20:40:41      Final result by Rudy Rod MD (09/22/23 20:40:41)                   Impression:      1. No acute abnormality.  2. Multilevel chronic degenerative changes.      Electronically signed by: Rudy Rod  Date:    09/22/2023  Time:    20:40               Narrative:    EXAMINATION:  CT CERVICAL SPINE WITHOUT CONTRAST    CLINICAL HISTORY:  Neck trauma (Age >= 65y);    TECHNIQUE:  Low dose axial CT images through the cervical spine, with sagittal and coronal reformations.  Contrast was not administered.    COMPARISON:  None    FINDINGS:  No acute fractures of the cervical spine.  Alignment is satisfactory.    Mild to moderate disc space narrowing throughout the cervical spine.  Osseous demineralization.  Posterior elements are intact.  Multilevel minimal posterior disc osteophyte complex.    No significant central canal narrowing.  Mild multilevel foraminal narrowing.    Limited evaluation of the intraspinal contents demonstrates no hematoma or mass.Paraspinal soft tissues exhibit no acute abnormalities.                                       X-Ray Knee 3 View Bilateral (Final result)  Result time 09/22/23 20:43:22      Final result by Rudy Rod MD (09/22/23 20:43:22)                   Impression:      No acute radiographic abnormality bilaterally.      Electronically signed by: Rudy Rod  Date:    09/22/2023  Time:    20:43               Narrative:    EXAMINATION:  XR KNEE 3 VIEW BILATERAL    CLINICAL HISTORY:  Injury, unspecified, initial encounter    TECHNIQUE:  AP, lateral, and Merchant views of both knees were performed.    COMPARISON:  None    FINDINGS:  No acute fracture,  subluxation or dislocation of the knees bilaterally.  Surgical fixation hardware noted in the distal right femur.  No significant joint effusion bilaterally.  No osseous destruction.  Joint spaces appear adequately maintained.                                       X-Ray Shoulder Trauma Bilateral (Final result)  Result time 09/22/23 20:43:38      Final result by Juan Melgar DO (09/22/23 20:43:38)                   Impression:      No acute fracture or dislocation of the bilateral shoulders.    Acute fractures of the right 7th and 8th ribs.      Electronically signed by: Juan Melgar  Date:    09/22/2023  Time:    20:43               Narrative:    EXAMINATION:  XR SHOULDER TRAUMA 3 VIEW BILATERAL    CLINICAL HISTORY:  Injury, unspecified, initial encounter    TECHNIQUE:  Three views of each shoulder were performed.    COMPARISON:  07/11/2017.    FINDINGS:  There is osteopenia.    Right shoulder: There is no acute fracture or dislocation.  Alignment is normal.  The humeral head is well seated in the glenoid.  There is mild joint space narrowing of the acromioclavicular and glenohumeral joints.    Left shoulder: There is no acute fracture or dislocation. Alignment is normal. The humeral head is well seated in the glenoid. There is mild joint space narrowing of the acromioclavicular and glenohumeral joints.    There are mildly displaced acute fractures of the right 7th and 8th ribs, posterolateral aspects.  There are several remote left-sided rib fractures.                                       X-Ray Forearm Right (Final result)  Result time 09/22/23 20:40:47      Final result by Juan Melgar DO (09/22/23 20:40:47)                   Impression:      Posterior positioning of the distal ulna with respect to the radius, which may be due to patient positioning. Ulnar dislocation not excluded.  Correlate with point tenderness.    No acute fracture.      Electronically signed by: Juan  Talia  Date:    09/22/2023  Time:    20:40               Narrative:    EXAMINATION:  XR FOREARM RIGHT    CLINICAL HISTORY:  Injury, unspecified, initial encounter    TECHNIQUE:  AP and lateral views of the right forearm were performed.    COMPARISON:  None    FINDINGS:  There is osteopenia.  There is no acute fracture.  There is a posterior positioning of the distal ulna with respect to the radius, which may be due to patient positioning.  Ulnar dislocation not excluded.

## 2023-09-25 NOTE — ASSESSMENT & PLAN NOTE
Urine culture with ngtd  Started on ceftriaxone, transition to cefpodoxime to complete 5 day course

## 2023-09-25 NOTE — TELEPHONE ENCOUNTER
No care due was identified.  [facility] Embedded Care Due Messages. Reference number: [id number].   9/25/2023 4:37:37 AM CDT

## 2023-09-25 NOTE — PLAN OF CARE
[facility] - Med Surg  Discharge Final Note    Primary Care Provider: Srikanth Preston MD    Expected Discharge Date: 9/25/2023    Final Discharge Note (most recent)       Final Note - 09/25/23 1233          Final Note    Assessment Type Final Discharge Note     Anticip[place] [facility] [place]ospital Resources/Appts/Education Provided Appointments scheduled and added to AVS;Post-Acute resouces added to AVS        Post-Acute Status    Post-Acute Authorization Home Health     Home Health Status Referrals Sent     Discharge Delays None known at this time                     Important Message from Medicare             Contact Info       Srikanth Preston MD   Specialty: Internal Medicine   Relationship: PCP - General    [address]  [facility] [address]   Phone: [phone]       Next Steps: Schedule an appointment as soon as possible for a visit in 1 week(s)

## 2023-09-25 NOTE — PLAN OF CARE
APPOINTMENT:    Patient Appointment(s) scheduled with Srikanth Preston MD, on Monday Oct 2, 2023 10:30 AM

## 2023-09-25 NOTE — PLAN OF CARE
Pt discharged to home via wheelchair with belongings. IV removed. Discharge packet discussed with pt. All questions answered. New meds to be picked up from home pharmacy.

## 2023-09-25 NOTE — DISCHARGE SUMMARY
[facility] Medicine  Discharge Summary      Patient Name: Elly Harris  MRN: [id number]  DAMION: [id number]  Patient Class: OP- Observation  Admission Date: 9/22/2023  Hospital Length of Stay: 0 days  Discharge Date and Time:  09/25/2023 2:55 PM  Attending Physician: Mariluz att. providers found   Discharging Provider: Jorge Phillips MD  Primary Care Provider: Srikanth Preston MD  [facility] Medicine Team: [facility] Jorge Phillips MD  Primary Care Team: [facility]    HPI:   Patient is a 67yo female with a PMHx of bipolar disorder being admitted to observation for hallucinations.  She also has auditory hallucinations. She is supposed to be on aripiprazole, bupropion, duloxetine, lamotrigine, olanzapine. She has been out of 1 of her medications due to financial issues and them not authorizing it, she lost her sample prescription. She reports auditory and visual hallucinations, responding to internal stimuli. No SI or HI. Multiple bruising from recent trauma, stating that fibromyalgia and moving with her walker she often falls. Today, she slipped in the bathroom and fell.  She hit her head. No LOC. She has had multiple prior falls. Her falls seem to be non syncopal in nature, stating that occasionally he was because she was transferring from walking with her walker to not, or when she slips on slippery floor. She also has chronic fibromyalgia was difficulty with ambulation so she is constantly using a walker.    In the ED, vitals stable. Intake labs remarkable for Cr 1.8, UA with hyaline casts and 13 WBCs. CT chest with spiculated 9 mm solid pulmonary nodule in the left lower lobe, suspicious for malignancy, Compression fractures of T8, T12 and L4, with few mm retropulsion at the T12 and L4 levels, Acute fracture posterior right 8th rib with mild displacement. CT max with bilateral acute nasal fractures involving the ala and nasal bridge. She was PECd in the ED for grave disability and admitted for  psych placement and medical clearance.      * No surgery found *      Hospital Course:   Pt was admitted for hallucination and JESSICA. Her kidney function is back to normal. Seen by psych , her meds have been adjusted. Hallucinations have resolved and mood stable. Psych has recommending rescinding PEC. Patient is cleared for discharge home. Follow up with psychiatry outpatient.       Goals of Care Treatment Preferences:  Code Status: Full Code      Consults:   Consults (From admission, onward)        Status Ordering Provider     Inpatient consult to Psychiatry  Once        Provider:  (Not yet assigned)    Completed LELIA GOEL JR          Psychiatric  Bipolar 2 disorder        Psychosis  Bipolar 2  - psych consulted for recs  - meds adjusted      Cardiac/Vascular  Essential hypertension  - metoprolol 100mg daily    Renal/  * Acute kidney injury  Patient with acute kidney injury/acute renal failure likely due to acute tubular necrosis caused by dehydration / nsaids JESSICA is currently worsening. Baseline creatinine 1.1 - Labs reviewed- Renal function/electrolytes with Estimated Creatinine Clearance: 40.6 mL/min (based on SCr of 1.4 mg/dL). according to latest data. Monitor urine output and serial BMP and adjust therapy as needed. Avoid nephrotoxins and renally dose meds for GFR listed above. Improving     Acute cystitis without hematuria  Urine culture with ngtd  Started on ceftriaxone, transition to cefpodoxime to complete 5 day course        Final Active Diagnoses:    Diagnosis Date Noted POA    PRINCIPAL PROBLEM:  Acute kidney injury [N17.9] 09/23/2023 Yes    Acute cystitis without hematuria [N30.00] 09/25/2023 Yes    Essential hypertension [I10] 08/02/2023 Yes    Bipolar 2 disorder [F31.81] 08/02/2023 Yes    Psychosis [F29] 08/02/2023 Yes      Problems Resolved During this Admission:       Discharged Condition: good    Disposition: Home or Self Care    Follow Up:   Follow-up Information     Mohan  Srikanth DAVIS MD. Schedule an appointment as soon as possible for a visit in 1 week.    Specialty: Internal Medicine  Contact information:  [address]  [facility] [address]  [phone]                       Patient Instructions:      Ambulatory referral/consult to ENT   Standing Status: Future   Referral Priority: Urgent Referral Type: Consultation   Referral Reason: Specialty Services Required   Requested Specialty: Otolaryngology   Number of Visits Requested: 1     Ambulatory referral/consult to Psychiatry   Standing Status: Future   Referral Priority: Routine Referral Type: Psychiatric   Referral Reason: Specialty Services Required   Requested Specialty: Psychiatry   Number of Visits Requested: 1     Diet Adult Regular     Notify your health care provider if you experience any of the following:  temperature >100.4     Notify your health care provider if you experience any of the following:  severe uncontrolled pain     Notify your health care provider if you experience any of the following:  difficulty breathing or increased cough     Notify your health care provider if you experience any of the following:  severe persistent headache     Notify your health care provider if you experience any of the following:  persistent dizziness, light-headedness, or visual disturbances     Notify your health care provider if you experience any of the following:  increased confusion or weakness     Activity as tolerated       Significant Diagnostic Studies: Labs: All labs within the past 24 hours have been reviewed    Pending Diagnostic Studies:     None         Medications:  Reconciled Home Medications:      Medication List      START taking these medications    cefpodoxime 100 MG tablet  Commonly known as: VANTIN  Take 1 tablet (100 mg total) by mouth 2 (two) times daily. for 2 days  Start taking on: September 26, 2023        CHANGE how you take these medications    cyclobenzaprine 10 MG tablet  Commonly known as:  FLEXERIL  Take 1 tablet (10 mg total) by mouth 3 (three) times daily as needed.  What changed: reasons to take this     * DULoxetine 60 MG capsule  Commonly known as: CYMBALTA  Take 1 capsule (60 mg total) by mouth once daily.  What changed:   · when to take this  · additional instructions     * DULoxetine 30 MG capsule  Commonly known as: CYMBALTA  Take 1 capsule (30 mg total) by mouth every evening. (60MG QAM & 30MG QPM)  What changed: Another medication with the same name was changed. Make sure you understand how and when to take each.     LORazepam 0.5 MG tablet  Commonly known as: ATIVAN  Take 1 tablet (0.5 mg total) by mouth daily as needed.  What changed: reasons to take this     metoprolol succinate 100 MG 24 hr tablet  Commonly known as: TOPROL-XL  Take 1 tablet (100 mg total) by mouth once daily.  What changed: when to take this         * This list has 2 medication(s) that are the same as other medications prescribed for you. Read the directions carefully, and ask your doctor or other care provider to review them with you.            CONTINUE taking these medications    acetaminophen 500 MG tablet  Commonly known as: TYLENOL  Take 1,000 mg by mouth 2 (two) times daily as needed for Pain.     ARIPiprazole 15 MG Tab  Commonly known as: ABILIFY  Take 1 tablet (15 mg total) by mouth once daily.     atorvastatin 20 MG tablet  Commonly known as: LIPITOR  Take 1 tablet by mouth once daily.     blood-gluc,BP meter,adult cuff Shira  1 each by Misc.(Non-Drug; Combo Route) route every morning.     cyanocobalamin 250 MCG tablet  Commonly known as: VITAMIN B-12  Take 1 tablet (250 mcg total) by mouth once daily.     esomeprazole 40 MG capsule  Commonly known as: NEXIUM  Take 1 capsule (40 mg total) by mouth once daily.     ferrous sulfate 325 (65 FE) MG EC tablet  Take 1 tablet (325 mg total) by mouth once daily.     gabapentin 100 MG capsule  Commonly known as: NEURONTIN  Take 1 capsule (100 mg total) by mouth 2 (two)  times daily.     lamoTRIgine 200 MG tablet  Commonly known as: LAMICTAL  Take 1 tablet (200 mg total) by mouth 2 (two) times daily.     losartan-hydrochlorothiazide 100-25 mg 100-25 mg per tablet  Commonly known as: HYZAAR  Take 1 tablet by mouth once daily.     melatonin 5 mg  Commonly known as: MELATIN  Take 10 mg by mouth nightly as needed (sleep).     naproxen 500 MG tablet  Commonly known as: NAPROSYN  Take 500 mg by mouth 2 (two) times daily with meals.     polyethylene glycol 17 gram Pwpk  Commonly known as: GLYCOLAX  Take 17 g by mouth 2 (two) times daily as needed (constipation caused by iron supplement).     sumatriptan 100 MG tablet  Commonly known as: IMITREX  TAKE ONE TABLET BY MOUTH AT ONSET, THEN ONE TABLET 2 HOURS LATER FOR EACH EPISODE     triamcinolone acetonide 0.1% 0.1 % cream  Commonly known as: KENALOG        STOP taking these medications    buPROPion 150 MG TB24 tablet  Commonly known as: WELLBUTRIN XL     FLUoxetine 20 MG capsule     OLANZapine 5 MG tablet  Commonly known as: ZyPREXA            Indwelling Lines/Drains at time of discharge:   Lines/Drains/Airways     None                 Time spent on the discharge of patient: 35 minutes         Jorge Phillips MD  Department of Hospital Medicine  [facility]

## 2023-09-25 NOTE — PLAN OF CARE
[facility]      HOME HEALTH ORDERS  FACE TO FACE ENCOUNTER    Patient Name: Elly Harris  YOB: 1955    PCP: Srikanth Preston MD   PCP Address: [address] / [place] [address]  PCP Phone Number: [phone]  PCP Fax: [phone]    Encounter Date: 9/22/23    Admit to Home Health    Diagnoses:  Active Hospital Problems    Diagnosis  POA    *Acute kidney injury [N17.9]  Yes    Essential hypertension [I10]  Yes    Bipolar 2 disorder [F31.81]  Yes    Psychosis [F29]  Yes      Resolved Hospital Problems   No resolved problems to display.       Follow Up Appointments:  Future Appointments   Date Time Provider Department Center   10/12/2023 10:40 AM JV Connelly MD [facility]       Allergies:  Review of patient's allergies indicates:   Allergen Reactions    Doxycycline Rash and Hives       Medications: Review discharge medications with patient and family and provide education.    Current Facility-Administered Medications   Medication Dose Route Frequency Provider Last Rate Last Admin    acetaminophen tablet 1,000 mg  1,000 mg Oral Q8H PRN Vinnie Chapa Jr., PA-C   1,000 mg at 09/25/23 0111    acetaminophen tablet 650 mg  650 mg Oral Q4H PRN Vinnie Chapa Jr., PA-C   650 mg at 09/24/23 1617    aluminum-magnesium hydroxide-simethicone 200-200-20 mg/5 mL suspension 30 mL  30 mL Oral QID PRN Vinnie Chapa Jr., PA-C        ARIPiprazole tablet 15 mg  15 mg Oral Daily Vinnie Chapa Jr., PA-C   15 mg at 09/25/23 0820    bisacodyL suppository 10 mg  10 mg Rectal Daily PRN Vinnie Chapa Jr., PA-C        cefTRIAXone (ROCEPHIN) 1 g in dextrose 5 % in water (D5W) 100 mL IVPB (MB+)  1 g Intravenous Q24H Vinnie Chapa Jr., PA-C   Stopped at 09/24/23 2103    DULoxetine DR capsule 30 mg  30 mg Oral QHS Vinnie Chapa Jr., PA-C   30 mg at 09/24/23 2033    DULoxetine DR capsule 60 mg  60 mg Oral QAM Vinnie Chapa Jr., PA-C   60 mg at  09/25/23 0618    HYDROcodone-acetaminophen 7.5-325 mg per tablet 1 tablet  1 tablet Oral Q8H PRN Juliocesar Cutler MD   1 tablet at 09/25/23 0536    lamoTRIgine tablet 200 mg  200 mg Oral BID Vinnie Chapa Jr., PA-C   200 mg at 09/25/23 0820    melatonin tablet 6 mg  6 mg Oral Nightly PRN Vinnie Chapa Jr., PA-C   6 mg at 09/24/23 2033    metoprolol succinate (TOPROL-XL) 24 hr tablet 100 mg  100 mg Oral Daily Vinnie Chapa Jr., PA-C   100 mg at 09/25/23 0820    naloxone 0.4 mg/mL injection 0.02 mg  0.02 mg Intravenous PRN Vinnie Chapa Jr., PA-C        ondansetron disintegrating tablet 8 mg  8 mg Oral Q8H PRN Vinnie Chapa Jr., PA-C        prochlorperazine injection Soln 5 mg  5 mg Intravenous Q6H PRN Vinnie Chapa Jr., PA-C        sodium chloride 0.9% flush 5 mL  5 mL Intravenous PRN Vinnie Chapa Jr., PA-C        Tdap (BOOSTRIX) vaccine injection 0.5 mL  0.5 mL Intramuscular vaccine x 1 dose Vinnie Chapa Jr., PA-C         Current Discharge Medication List        CONTINUE these medications which have NOT CHANGED    Details   acetaminophen (TYLENOL) 500 MG tablet Take 1,000 mg by mouth 2 (two) times daily as needed for Pain.      ARIPiprazole (ABILIFY) 15 MG Tab Take 15 mg by mouth once daily.      atorvastatin (LIPITOR) 20 MG tablet Take 1 tablet by mouth once daily.  Qty: 90 tablet, Refills: 0      blood-gluc,BP meter,adult cuff Shira 1 each by Misc.(Non-Drug; Combo Route) route every morning.  Qty: 1 each, Refills: 0      buPROPion (WELLBUTRIN XL) 150 MG TB24 tablet Take 150 mg by mouth once daily.      cyanocobalamin (VITAMIN B-12) 250 MCG tablet Take 1 tablet (250 mcg total) by mouth once daily.  Qty: 30 tablet, Refills: 5      cyclobenzaprine (FLEXERIL) 10 MG tablet Take 1 tablet (10 mg total) by mouth 3 (three) times daily as needed.  Qty: 90 tablet, Refills: 3      !! DULoxetine (CYMBALTA) 30 MG capsule Take 30 mg by mouth every evening. (60MG QAM &  30MG QPM)      !! DULoxetine (CYMBALTA) 60 MG capsule Take 1 capsule (60 mg total) by mouth once daily.  Qty: 90 capsule, Refills: 1      esomeprazole (NEXIUM) 40 MG capsule Take 1 capsule (40 mg total) by mouth once daily.  Qty: 30 capsule, Refills: 5      ferrous sulfate 325 (65 FE) MG EC tablet Take 1 tablet (325 mg total) by mouth once daily.  Qty: 30 tablet, Refills: 11      FLUoxetine 20 MG capsule Take 20 mg by mouth once daily.      gabapentin (NEURONTIN) 100 MG capsule Take 1 capsule (100 mg total) by mouth 2 (two) times daily.  Qty: 60 capsule, Refills: 11      lamoTRIgine (LAMICTAL) 200 MG tablet Take 200 mg by mouth 2 (two) times daily.      lorazepam (ATIVAN) 0.5 MG tablet Take 1 tablet (0.5 mg total) by mouth daily as needed.  Qty: 30 tablet, Refills: 1      losartan-hydrochlorothiazide 100-25 mg (HYZAAR) 100-25 mg per tablet Take 1 tablet by mouth once daily.  Qty: 90 tablet, Refills: 3    Comments: .      melatonin (MELATIN) 5 mg Take 10 mg by mouth nightly as needed (sleep).      metoprolol succinate (TOPROL-XL) 100 MG 24 hr tablet Take 1 tablet by mouth daily.  Qty: 90 tablet, Refills: 0      naproxen (NAPROSYN) 500 MG tablet Take 500 mg by mouth 2 (two) times daily with meals.      OLANZapine (ZYPREXA) 5 MG tablet Take 5 mg by mouth once daily.      polyethylene glycol (GLYCOLAX) 17 gram PwPk Take 17 g by mouth 2 (two) times daily as needed (constipation caused by iron supplement).  Refills: 0      sumatriptan (IMITREX) 100 MG tablet TAKE ONE TABLET BY MOUTH AT ONSET, THEN ONE TABLET 2 HOURS LATER FOR EACH EPISODE  Qty: 9 tablet, Refills: 0      triamcinolone acetonide 0.1% (KENALOG) 0.1 % cream        !! - Potential duplicate medications found. Please discuss with provider.            I have seen and examined this patient within the last 30 days. My clinical findings that support the need for the home health skilled services and home bound status are the following:no   Weakness/numbness causing  balance and gait disturbance due to Weakness/Debility making it taxing to leave home.     Diet:   regular diet    Labs:  N/a    Referrals/ Consults  Physical Therapy to evaluate and treat. Evaluate for home safety and equipment needs; Establish/upgrade home exercise program. Perform / instruct on therapeutic exercises, gait training, transfer training, and Range of Motion.  Occupational Therapy to evaluate and treat. Evaluate home environment for safety and equipment needs. Perform/Instruct on transfers, ADL training, ROM, and therapeutic exercises.    Activities:   activity as tolerated    Nursing:   Agency to admit patient within 24 hours of hospital discharge unless specified on physician order or at patient request    SN to complete comprehensive assessment including routine vital signs. Instruct on disease process and s/s of complications to report to MD. Review/verify medication list sent home with the patient at time of discharge  and instruct patient/caregiver as needed. Frequency may be adjusted depending on start of care date.     Skilled nurse to perform up to 3 visits PRN for symptoms related to diagnosis    Notify MD if SBP > 160 or < 90; DBP > 90 or < 50; HR > 120 or < 50; Temp > 101; O2 < 88%; Other:       Ok to schedule additional visits based on staff availability and patient request on consecutive days within the home health episode.    When multiple disciplines ordered:    Start of Care occurs on Sunday - Wednesday schedule remaining discipline evaluations as ordered on separate consecutive days following the start of care.    Thursday SOC -schedule subsequent evaluations Friday and Monday the following week.     Friday - Saturday SOC - schedule subsequent discipline evaluations on consecutive days starting Monday of the following week.    For all post-discharge communication and subsequent orders please contact patient's primary care physician.    Miscellaneous   N/a    Home Health  Aide:  Physical Therapy Three times weekly and Occupational Therapy Three times weekly    Wound Care Orders  no    I certify that this patient is confined to her home and needs physical therapy and occupational therapy.          ABDOMINAL PAIN/VOMITING/NAUSEA

## 2023-09-25 NOTE — PLAN OF CARE
[facility] Med Surg  Discharge Final Note    Primary Care Provider: Srikanth Preston MD    Expected Discharge Date: 9/25/2023    Final Discharge Note (most recent)       Final Note - 09/25/23 1006          Final Note    Assessment Type Final Discharge Note     Anticipated Discharge Disposition Home-Health Care [facility]     Hospital Resources/Appts/Education Provided Appointments scheduled and added to AVS;Post-Acute resouces added to AVS        Post-Acute Status    Post-Acute Authorization Home Health     Home Health Status Set-up Complete/Auth obtained     Discharge Delays None known at this time                     Important Message from Medicare             Contact Info       Srikanth Preston MD   Specialty: Internal Medicine   Relationship: PCP - General    [address]  [facility] [address]   Phone: [phone]       Next Steps: Schedule an appointment as soon as possible for a visit in 1 week(s)

## 2023-09-26 ENCOUNTER — PATIENT MESSAGE (OUTPATIENT)
Dept: OTOLARYNGOLOGY | Facility: CLINIC | Age: 68
End: 2023-09-26
Payer: MEDICARE

## 2023-09-28 NOTE — PROGRESS NOTES
Outpatient Care Management   - High Risk Patient Assessment    Patient: Elly Harris  MRN:  [id number]  Date of Service:  9/28/2023  Completed by:  Maral Sanders LMSW  Referral Date: 08/03/2023    Reason for Visit   Patient presents with    Other     9/18/2023  1st attempt to complete Initial Assessment  for Outpatient Care Management, left message.  Will mail unable to assess letter.      9/20/2023  2nd attempt to complete Initial Assessment  for Outpatient Care Management, left message.  Collaborate with OPCM RN.    9/28/2023  3rd attempt to complete Initial Assessment  for Outpatient Care Management, left message.        Case Closure     09/28/2023         Brief Summary: This OPCM  is closing encounter with patient after 3 failed initial assessment attempts (  9/18,9/20,9/28     ) and a letter mailed on  9/18  to patient. Attempted to communicate with pt's daughter to assist with connecting to patient. OPCM maintained communication with RN  for assistance with connecting sw to patient (unsuccessful).  OPCM  will close case. Kellen Antonio RN notified of case closure.

## 2023-10-01 NOTE — PROGRESS NOTES
MEDICAL HISTORY:  Hypertension.  Hyperlipidemia.  Chronic kidney disease stage 3  Gastroesophageal reflux disease with history of dilatation.  Depression, anxiety.  Bipolar disorder  Fibromyalgia.  Lumbar spondylosis with lumbar spinal stenosis  Thoracic degenerative disc disease  Thoracic compression fractures  Restless legs syndrome.  Migraine headache disorder.  Herpes zoster infection.  Chronic iron deficiency anemia      SURGICAL HISTORY:  Complete hysterectomy.  Bilateral Carmichael neuroma neurectomies with hammertoe repair.  Tonsillectomy.  .  Right elbow surgery due to epicondylitis.  LASIK surgery in both eyes.  Osteotomies involving the mandibulomaxillary region of the face with chin   augmentation.  Right femur fracture with intramedullary neil placement.  Posttraumatic fractures involving her right ankle, left elbow and clavicle.     MEDICATIONS:  Abilify 15 mg daily  Metoprolol  mg daily  Acetaminophen 500 mg two tablets twice a day as needed.  Atorvastatin 20 mg daily.  Cyclobenzaprine 10 mg three times a day as needed., usually once a day  Duloxetine 30 mg in the morning, 60 mg at night.  Nexium 40 mg daily.  Prozac 20 mg daily.  Lamictal 200 mg twice a day.  Ativan 0.5 mg one pill a day b.i.d. as needed  Losartan -25 mg daily  Zyprexa 5 mg   Gabapentin 100 mg b.i.d.  Wellbutrin  mg  Vacuolar 1.5 mg  Ferrous sulfate once a day  Folic acid 1 mg daily      68-year-old female   Hospital follow-up,  to .  Presented with visual hallucinations.  She chronically has auditory hallucinations.  She was 1st diagnosed with UTI based on an abnormal UA but urine culture was negative      It was noted on CT of the chest that the is a stable left lower lobe pulmonary nodule spiculated.  This was noted on the CT 3 months prior.  She is being followed by pulmonologist    Hips reveal acute kidney injury treated with IV fluids.    She has a chronic anemia in his on iron  supplementation which is new.  Prior B12 level was normal  ps  She was told to stop practically all of psychotropic medications with the exception of lorazepam.  This is according to the patient.  She has not done so she is waiting to follow-up with her regular psychiatrist    She has multiple compression fractions in the thoracic vertebrae and does have chronic pain involving the thoracic spine in the lumbar spine.  She is being followed by a pain management doctor    It was noted couple days prior to admission she had a fall.  There was evidence for rib fracture of the 8th rib as well as nasal bridge fracture.  Right now she has no trouble breathing through her nose        Presently has no chest pain, shortness a breath, abdominal pain.  Regular bowel function no difficulty urinating    Examination   Weight 170 lb  Pulse 68   Blood pressure 100/62   Chest clear breath sounds  Heart regular rate and rhythm  Abdominal exam soft nontender    Impression   Bipolar disorder with psychosis  Thoracic compression fracture   Lumbar spondylosis  Chronic kidney disease stage IIIA   Chronic iron deficiency anemia  Hypertension  Osteoporosis      Plan   Today follow-up on renal function panel CBC and iron levels  MRI thoracic and lumbar vertebrae

## 2023-10-02 ENCOUNTER — OFFICE VISIT (OUTPATIENT)
Dept: INTERNAL MEDICINE | Facility: CLINIC | Age: 68
End: 2023-10-02
Payer: MEDICARE

## 2023-10-02 ENCOUNTER — IMMUNIZATION (OUTPATIENT)
Dept: INTERNAL MEDICINE | Facility: CLINIC | Age: 68
End: 2023-10-02
Payer: MEDICARE

## 2023-10-02 ENCOUNTER — LAB VISIT (OUTPATIENT)
Dept: LAB | Facility: HOSPITAL | Age: 68
End: 2023-10-02
Attending: INTERNAL MEDICINE
Payer: MEDICARE

## 2023-10-02 VITALS
DIASTOLIC BLOOD PRESSURE: 62 MMHG | SYSTOLIC BLOOD PRESSURE: 102 MMHG | HEART RATE: 68 BPM | HEIGHT: 67 IN | OXYGEN SATURATION: 99 % | WEIGHT: 170.44 LBS | BODY MASS INDEX: 26.75 KG/M2

## 2023-10-02 DIAGNOSIS — R44.3 HALLUCINATIONS: ICD-10-CM

## 2023-10-02 DIAGNOSIS — D50.9 IRON DEFICIENCY ANEMIA, UNSPECIFIED IRON DEFICIENCY ANEMIA TYPE: ICD-10-CM

## 2023-10-02 DIAGNOSIS — I70.0 AORTIC ATHEROSCLEROSIS: ICD-10-CM

## 2023-10-02 DIAGNOSIS — I10 ESSENTIAL HYPERTENSION: ICD-10-CM

## 2023-10-02 DIAGNOSIS — M81.0 OSTEOPOROSIS, UNSPECIFIED OSTEOPOROSIS TYPE, UNSPECIFIED PATHOLOGICAL FRACTURE PRESENCE: ICD-10-CM

## 2023-10-02 DIAGNOSIS — R91.1 PULMONARY NODULE, RIGHT: ICD-10-CM

## 2023-10-02 DIAGNOSIS — M47.816 LUMBAR SPONDYLOSIS: ICD-10-CM

## 2023-10-02 DIAGNOSIS — N18.31 STAGE 3A CHRONIC KIDNEY DISEASE: ICD-10-CM

## 2023-10-02 DIAGNOSIS — S22.000A COMPRESSION FRACTURE OF THORACIC VERTEBRA, UNSPECIFIED THORACIC VERTEBRAL LEVEL, INITIAL ENCOUNTER: ICD-10-CM

## 2023-10-02 DIAGNOSIS — F31.9 BIPOLAR 1 DISORDER: ICD-10-CM

## 2023-10-02 DIAGNOSIS — M54.9 DORSALGIA, UNSPECIFIED: ICD-10-CM

## 2023-10-02 DIAGNOSIS — F31.9 BIPOLAR 1 DISORDER: Primary | ICD-10-CM

## 2023-10-02 LAB
ALBUMIN SERPL BCP-MCNC: 4 G/DL (ref 3.5–5.2)
ANION GAP SERPL CALC-SCNC: 9 MMOL/L (ref 8–16)
BASOPHILS # BLD AUTO: 0.04 K/UL (ref 0–0.2)
BASOPHILS NFR BLD: 0.5 % (ref 0–1.9)
BUN SERPL-MCNC: 19 MG/DL (ref 8–23)
CALCIUM SERPL-MCNC: 10.2 MG/DL (ref 8.7–10.5)
CHLORIDE SERPL-SCNC: 105 MMOL/L (ref 95–110)
CO2 SERPL-SCNC: 28 MMOL/L (ref 23–29)
CREAT SERPL-MCNC: 2.2 MG/DL (ref 0.5–1.4)
DIFFERENTIAL METHOD: ABNORMAL
EOSINOPHIL # BLD AUTO: 0.3 K/UL (ref 0–0.5)
EOSINOPHIL NFR BLD: 3.9 % (ref 0–8)
ERYTHROCYTE [DISTWIDTH] IN BLOOD BY AUTOMATED COUNT: 25.9 % (ref 11.5–14.5)
EST. GFR  (NO RACE VARIABLE): 23.7 ML/MIN/1.73 M^2
FERRITIN SERPL-MCNC: 70 NG/ML (ref 20–300)
GLUCOSE SERPL-MCNC: 100 MG/DL (ref 70–110)
HCT VFR BLD AUTO: 38.7 % (ref 37–48.5)
HGB BLD-MCNC: 11 G/DL (ref 12–16)
IMM GRANULOCYTES # BLD AUTO: 0.03 K/UL (ref 0–0.04)
IMM GRANULOCYTES NFR BLD AUTO: 0.4 % (ref 0–0.5)
IRON SERPL-MCNC: 62 UG/DL (ref 30–160)
LYMPHOCYTES # BLD AUTO: 1 K/UL (ref 1–4.8)
LYMPHOCYTES NFR BLD: 13.8 % (ref 18–48)
MAGNESIUM SERPL-MCNC: 2.4 MG/DL (ref 1.6–2.6)
MCH RBC QN AUTO: 26.3 PG (ref 27–31)
MCHC RBC AUTO-ENTMCNC: 28.4 G/DL (ref 32–36)
MCV RBC AUTO: 93 FL (ref 82–98)
MONOCYTES # BLD AUTO: 0.6 K/UL (ref 0.3–1)
MONOCYTES NFR BLD: 8 % (ref 4–15)
NEUTROPHILS # BLD AUTO: 5.5 K/UL (ref 1.8–7.7)
NEUTROPHILS NFR BLD: 73.4 % (ref 38–73)
NRBC BLD-RTO: 0 /100 WBC
PHOSPHATE SERPL-MCNC: 3.5 MG/DL (ref 2.7–4.5)
PLATELET # BLD AUTO: 218 K/UL (ref 150–450)
PMV BLD AUTO: 11.5 FL (ref 9.2–12.9)
POTASSIUM SERPL-SCNC: 4.4 MMOL/L (ref 3.5–5.1)
RBC # BLD AUTO: 4.18 M/UL (ref 4–5.4)
SATURATED IRON: 15 % (ref 20–50)
SODIUM SERPL-SCNC: 142 MMOL/L (ref 136–145)
TOTAL IRON BINDING CAPACITY: 426 UG/DL (ref 250–450)
TRANSFERRIN SERPL-MCNC: 288 MG/DL (ref 200–375)
WBC # BLD AUTO: 7.48 K/UL (ref 3.9–12.7)

## 2023-10-02 PROCEDURE — 3288F PR FALLS RISK ASSESSMENT DOCUMENTED: ICD-10-PCS | Mod: HCNC,CPTII,S$GLB, | Performed by: INTERNAL MEDICINE

## 2023-10-02 PROCEDURE — 3288F FALL RISK ASSESSMENT DOCD: CPT | Mod: HCNC,CPTII,S$GLB, | Performed by: INTERNAL MEDICINE

## 2023-10-02 PROCEDURE — 3074F SYST BP LT 130 MM HG: CPT | Mod: HCNC,CPTII,S$GLB, | Performed by: INTERNAL MEDICINE

## 2023-10-02 PROCEDURE — G0008 ADMIN INFLUENZA VIRUS VAC: HCPCS | Mod: HCNC,S$GLB,, | Performed by: INTERNAL MEDICINE

## 2023-10-02 PROCEDURE — 1125F PR PAIN SEVERITY QUANTIFIED, PAIN PRESENT: ICD-10-PCS | Mod: HCNC,CPTII,S$GLB, | Performed by: INTERNAL MEDICINE

## 2023-10-02 PROCEDURE — 1159F PR MEDICATION LIST DOCUMENTED IN MEDICAL RECORD: ICD-10-PCS | Mod: HCNC,CPTII,S$GLB, | Performed by: INTERNAL MEDICINE

## 2023-10-02 PROCEDURE — 3008F BODY MASS INDEX DOCD: CPT | Mod: HCNC,CPTII,S$GLB, | Performed by: INTERNAL MEDICINE

## 2023-10-02 PROCEDURE — 1160F RVW MEDS BY RX/DR IN RCRD: CPT | Mod: HCNC,CPTII,S$GLB, | Performed by: INTERNAL MEDICINE

## 2023-10-02 PROCEDURE — 36415 COLL VENOUS BLD VENIPUNCTURE: CPT | Mod: HCNC | Performed by: INTERNAL MEDICINE

## 2023-10-02 PROCEDURE — 99999 PR PBB SHADOW E&M-EST. PATIENT-LVL IV: CPT | Mod: PBBFAC,HCNC,, | Performed by: INTERNAL MEDICINE

## 2023-10-02 PROCEDURE — 99214 OFFICE O/P EST MOD 30 MIN: CPT | Mod: HCNC,S$GLB,, | Performed by: INTERNAL MEDICINE

## 2023-10-02 PROCEDURE — 99999 PR PBB SHADOW E&M-EST. PATIENT-LVL IV: ICD-10-PCS | Mod: PBBFAC,HCNC,, | Performed by: INTERNAL MEDICINE

## 2023-10-02 PROCEDURE — 3074F PR MOST RECENT SYSTOLIC BLOOD PRESSURE < 130 MM HG: ICD-10-PCS | Mod: HCNC,CPTII,S$GLB, | Performed by: INTERNAL MEDICINE

## 2023-10-02 PROCEDURE — 1159F MED LIST DOCD IN RCRD: CPT | Mod: HCNC,CPTII,S$GLB, | Performed by: INTERNAL MEDICINE

## 2023-10-02 PROCEDURE — 85025 COMPLETE CBC W/AUTO DIFF WBC: CPT | Mod: HCNC | Performed by: INTERNAL MEDICINE

## 2023-10-02 PROCEDURE — G0008 FLU VACCINE - QUADRIVALENT - ADJUVANTED: ICD-10-PCS | Mod: HCNC,S$GLB,, | Performed by: INTERNAL MEDICINE

## 2023-10-02 PROCEDURE — 3008F PR BODY MASS INDEX (BMI) DOCUMENTED: ICD-10-PCS | Mod: HCNC,CPTII,S$GLB, | Performed by: INTERNAL MEDICINE

## 2023-10-02 PROCEDURE — 80069 RENAL FUNCTION PANEL: CPT | Mod: HCNC | Performed by: INTERNAL MEDICINE

## 2023-10-02 PROCEDURE — 83735 ASSAY OF MAGNESIUM: CPT | Mod: HCNC | Performed by: INTERNAL MEDICINE

## 2023-10-02 PROCEDURE — 90694 VACC AIIV4 NO PRSRV 0.5ML IM: CPT | Mod: HCNC,S$GLB,, | Performed by: INTERNAL MEDICINE

## 2023-10-02 PROCEDURE — 1101F PT FALLS ASSESS-DOCD LE1/YR: CPT | Mod: HCNC,CPTII,S$GLB, | Performed by: INTERNAL MEDICINE

## 2023-10-02 PROCEDURE — 90694 FLU VACCINE - QUADRIVALENT - ADJUVANTED: ICD-10-PCS | Mod: HCNC,S$GLB,, | Performed by: INTERNAL MEDICINE

## 2023-10-02 PROCEDURE — 83540 ASSAY OF IRON: CPT | Mod: HCNC | Performed by: INTERNAL MEDICINE

## 2023-10-02 PROCEDURE — 82728 ASSAY OF FERRITIN: CPT | Mod: HCNC | Performed by: INTERNAL MEDICINE

## 2023-10-02 PROCEDURE — 99214 PR OFFICE/OUTPT VISIT, EST, LEVL IV, 30-39 MIN: ICD-10-PCS | Mod: HCNC,S$GLB,, | Performed by: INTERNAL MEDICINE

## 2023-10-02 PROCEDURE — 1101F PR PT FALLS ASSESS DOC 0-1 FALLS W/OUT INJ PAST YR: ICD-10-PCS | Mod: HCNC,CPTII,S$GLB, | Performed by: INTERNAL MEDICINE

## 2023-10-02 PROCEDURE — 3078F PR MOST RECENT DIASTOLIC BLOOD PRESSURE < 80 MM HG: ICD-10-PCS | Mod: HCNC,CPTII,S$GLB, | Performed by: INTERNAL MEDICINE

## 2023-10-02 PROCEDURE — 1160F PR REVIEW ALL MEDS BY PRESCRIBER/CLIN PHARMACIST DOCUMENTED: ICD-10-PCS | Mod: HCNC,CPTII,S$GLB, | Performed by: INTERNAL MEDICINE

## 2023-10-02 PROCEDURE — 84466 ASSAY OF TRANSFERRIN: CPT | Mod: HCNC | Performed by: INTERNAL MEDICINE

## 2023-10-02 PROCEDURE — 1125F AMNT PAIN NOTED PAIN PRSNT: CPT | Mod: HCNC,CPTII,S$GLB, | Performed by: INTERNAL MEDICINE

## 2023-10-02 PROCEDURE — 3078F DIAST BP <80 MM HG: CPT | Mod: HCNC,CPTII,S$GLB, | Performed by: INTERNAL MEDICINE

## 2023-10-02 RX ORDER — OLANZAPINE 5 MG/1
TABLET ORAL
COMMUNITY

## 2023-10-02 RX ORDER — IBANDRONATE SODIUM 150 MG/1
TABLET, FILM COATED ORAL
COMMUNITY

## 2023-10-02 RX ORDER — BUPROPION HYDROCHLORIDE 300 MG/1
300 TABLET ORAL
COMMUNITY
Start: 2023-09-24

## 2023-10-02 RX ORDER — FOLIC ACID 1 MG/1
1 TABLET ORAL DAILY
COMMUNITY

## 2023-10-02 RX ORDER — CARIPRAZINE 1.5 MG/1
1.5 CAPSULE, GELATIN COATED ORAL
COMMUNITY
Start: 2023-06-20

## 2023-10-06 ENCOUNTER — HOSPITAL ENCOUNTER (EMERGENCY)
Facility: HOSPITAL | Age: 68
Discharge: HOME | End: 2023-10-07
Attending: STUDENT IN AN ORGANIZED HEALTH CARE EDUCATION/TRAINING PROGRAM
Payer: MEDICARE

## 2023-10-06 VITALS
OXYGEN SATURATION: 95 % | WEIGHT: 170.44 LBS | RESPIRATION RATE: 20 BRPM | DIASTOLIC BLOOD PRESSURE: 77 MMHG | TEMPERATURE: 98 F | HEART RATE: 62 BPM | BODY MASS INDEX: 26.75 KG/M2 | SYSTOLIC BLOOD PRESSURE: 177 MMHG | HEIGHT: 67 IN

## 2023-10-06 DIAGNOSIS — R44.3 HALLUCINATIONS: Primary | ICD-10-CM

## 2023-10-06 LAB
ALBUMIN SERPL BCP-MCNC: 4.1 G/DL (ref 3.5–5.2)
ALP SERPL-CCNC: 129 U/L (ref 55–135)
ALT SERPL W/O P-5'-P-CCNC: 14 U/L (ref 10–44)
AMPHET+METHAMPHET UR QL: NEGATIVE
ANION GAP SERPL CALC-SCNC: 12 MMOL/L (ref 8–16)
ANISOCYTOSIS BLD QL SMEAR: SLIGHT
APAP SERPL-MCNC: <3 UG/ML (ref 10–20)
AST SERPL-CCNC: 27 U/L (ref 10–40)
BARBITURATES UR QL SCN>200 NG/ML: NEGATIVE
BASOPHILS # BLD AUTO: 0.05 K/UL (ref 0–0.2)
BASOPHILS NFR BLD: 0.8 % (ref 0–1.9)
BENZODIAZ UR QL SCN>200 NG/ML: NEGATIVE
BILIRUB SERPL-MCNC: 0.5 MG/DL (ref 0.1–1)
BILIRUB UR QL STRIP: NEGATIVE
BUN SERPL-MCNC: 27 MG/DL (ref 8–23)
BZE UR QL SCN: NEGATIVE
CALCIUM SERPL-MCNC: 10 MG/DL (ref 8.7–10.5)
CANNABINOIDS UR QL SCN: NEGATIVE
CHLORIDE SERPL-SCNC: 104 MMOL/L (ref 95–110)
CLARITY UR REFRACT.AUTO: CLEAR
CO2 SERPL-SCNC: 24 MMOL/L (ref 23–29)
COLOR UR AUTO: YELLOW
CREAT SERPL-MCNC: 1.4 MG/DL (ref 0.5–1.4)
CREAT UR-MCNC: 60 MG/DL (ref 15–325)
DIFFERENTIAL METHOD: ABNORMAL
EOSINOPHIL # BLD AUTO: 0.3 K/UL (ref 0–0.5)
EOSINOPHIL NFR BLD: 4.4 % (ref 0–8)
ERYTHROCYTE [DISTWIDTH] IN BLOOD BY AUTOMATED COUNT: 23.4 % (ref 11.5–14.5)
EST. GFR  (NO RACE VARIABLE): 40.7 ML/MIN/1.73 M^2
ETHANOL SERPL-MCNC: <10 MG/DL
GLUCOSE SERPL-MCNC: 74 MG/DL (ref 70–110)
GLUCOSE UR QL STRIP: NEGATIVE
HCT VFR BLD AUTO: 35.6 % (ref 37–48.5)
HGB BLD-MCNC: 11 G/DL (ref 12–16)
HGB UR QL STRIP: NEGATIVE
HYPOCHROMIA BLD QL SMEAR: ABNORMAL
IMM GRANULOCYTES # BLD AUTO: 0.03 K/UL (ref 0–0.04)
IMM GRANULOCYTES NFR BLD AUTO: 0.5 % (ref 0–0.5)
KETONES UR QL STRIP: NEGATIVE
LEUKOCYTE ESTERASE UR QL STRIP: ABNORMAL
LYMPHOCYTES # BLD AUTO: 1.3 K/UL (ref 1–4.8)
LYMPHOCYTES NFR BLD: 22 % (ref 18–48)
MCH RBC QN AUTO: 26.8 PG (ref 27–31)
MCHC RBC AUTO-ENTMCNC: 30.9 G/DL (ref 32–36)
MCV RBC AUTO: 87 FL (ref 82–98)
METHADONE UR QL SCN>300 NG/ML: NEGATIVE
MICROSCOPIC COMMENT: NORMAL
MONOCYTES # BLD AUTO: 0.5 K/UL (ref 0.3–1)
MONOCYTES NFR BLD: 7.7 % (ref 4–15)
NEUTROPHILS # BLD AUTO: 3.9 K/UL (ref 1.8–7.7)
NEUTROPHILS NFR BLD: 64.6 % (ref 38–73)
NITRITE UR QL STRIP: NEGATIVE
NRBC BLD-RTO: 0 /100 WBC
OPIATES UR QL SCN: NEGATIVE
OVALOCYTES BLD QL SMEAR: ABNORMAL
PCP UR QL SCN>25 NG/ML: NEGATIVE
PH UR STRIP: 6 [PH] (ref 5–8)
PLATELET # BLD AUTO: 178 K/UL (ref 150–450)
PLATELET BLD QL SMEAR: ABNORMAL
PMV BLD AUTO: 10.2 FL (ref 9.2–12.9)
POIKILOCYTOSIS BLD QL SMEAR: SLIGHT
POTASSIUM SERPL-SCNC: 4.3 MMOL/L (ref 3.5–5.1)
PROT SERPL-MCNC: 7.7 G/DL (ref 6–8.4)
PROT UR QL STRIP: NEGATIVE
RBC # BLD AUTO: 4.11 M/UL (ref 4–5.4)
RBC #/AREA URNS AUTO: 1 /HPF (ref 0–4)
SODIUM SERPL-SCNC: 140 MMOL/L (ref 136–145)
SP GR UR STRIP: 1.01 (ref 1–1.03)
SQUAMOUS #/AREA URNS AUTO: 1 /HPF
T4 FREE SERPL-MCNC: 0.89 NG/DL (ref 0.71–1.51)
TOXICOLOGY INFORMATION: NORMAL
TSH SERPL DL<=0.005 MIU/L-ACNC: 6.5 UIU/ML (ref 0.4–4)
URN SPEC COLLECT METH UR: ABNORMAL
WBC # BLD AUTO: 6.1 K/UL (ref 3.9–12.7)
WBC #/AREA URNS AUTO: 2 /HPF (ref 0–5)

## 2023-10-06 PROCEDURE — 84443 ASSAY THYROID STIM HORMONE: CPT | Mod: HCNC

## 2023-10-06 PROCEDURE — 99283 EMERGENCY DEPT VISIT LOW MDM: CPT | Mod: HCNC

## 2023-10-06 PROCEDURE — 84439 ASSAY OF FREE THYROXINE: CPT | Mod: HCNC

## 2023-10-06 PROCEDURE — 80307 DRUG TEST PRSMV CHEM ANLYZR: CPT | Mod: HCNC

## 2023-10-06 PROCEDURE — 80053 COMPREHEN METABOLIC PANEL: CPT | Mod: HCNC

## 2023-10-06 PROCEDURE — 82077 ASSAY SPEC XCP UR&BREATH IA: CPT | Mod: HCNC

## 2023-10-06 PROCEDURE — 80143 DRUG ASSAY ACETAMINOPHEN: CPT | Mod: HCNC

## 2023-10-06 PROCEDURE — 81001 URINALYSIS AUTO W/SCOPE: CPT | Mod: HCNC,59

## 2023-10-06 PROCEDURE — 85025 COMPLETE CBC W/AUTO DIFF WBC: CPT | Mod: HCNC | Performed by: STUDENT IN AN ORGANIZED HEALTH CARE EDUCATION/TRAINING PROGRAM

## 2023-10-07 NOTE — ED NOTES
Pt belongings collected and labeled. 1 bag placed in secured closet. 1 valuables bag placed in safe. # [id number]      1 clear patient belongings bag in secured closet contains:   - 1 makeup brush  - 1 lipstick  - 1 sharpie  - 1 green purse  - 2 black tennis shoes  - 1 pair grey pants with dog pattern  - 2 grey socks  - 1 orange shirt  - 1 blue bra  - 1 hairbrush  - 1 pair sunglasses  - 1 walker    Valuables bag #[id number] locked in safe:  - wallet with 5 gift cards, 2 IDs, 3 debit cards, 2 panera cards, 1 transit card, 2 humera cards  - 1$ bill and 1 lydia

## 2023-10-07 NOTE — ED PROVIDER NOTES
Encounter Date: 10/6/2023       History     Chief Complaint   Patient presents with    Hallucinations     Sent from [facility] for medication clearance. Auditory and visual halluincations     Elly Harris is a 68 y.o. female with PMH of   Hypertension, bipolar disorder, psychosis, presenting to [facility] ED for  hallucinations.  Patient states that she has been experiencing worsening hallucinations.  Endorses auditory/ visual hallucinations.  Denies command hallucinations.  Denies HI/SI. Patient lives with her daughter.  States that she is able to feed herself and conduct basic hygiene.  Reports that she was recently  admitted for similar symptoms as well as a fall in which she sustained a nasal bone fracture and a kidney injury.    Denies any falls since that admission. She has been compliant with her medications but feels that her hallucinations are very distressing now.  Patient for voluntary admission [facility] and was sent here for  medical clearance.        Review of patient's allergies indicates:   Allergen Reactions    Doxycycline Rash and Hives     Past Medical History:   Diagnosis Date    Anemia     Depression     Fibromyalgia     GERD (gastroesophageal reflux disease)     Herpes zoster infection of thoracic region 2014    left    Hyperlipidemia     Hypertension     Migraine headache     RLS (restless legs syndrome)      Past Surgical History:   Procedure Laterality Date    BILATERAL SALPINGOOPHORECTOMY       SECTION      CLOSED REDUCTION OF FRACTURE OF NASAL BONE Bilateral 2021    Procedure: CLOSED REDUCTION, FRACTURE, NASAL BONE;  Surgeon: JV Connelly MD;  Location: [facility] OR;  Service: ENT;  Laterality: Bilateral;    CLOSED REDUCTION OF FRACTURE OF NASAL BONE Bilateral 2021    Procedure: CLOSED REDUCTION, FRACTURE, NASAL BONE;  Surgeon: JV Connelly MD;  Location: [facility] OR;  Service: ENT;  Laterality: Bilateral;    DEBRIDEMENT TENNIS ELBOW      FEMUR SURGERY  2013     FOOT NEUROMA SURGERY Bilateral     with hammertoe repair    HYSTERECTOMY      vaginal    LASIK      MANDIBLE FRACTURE SURGERY      TONSILLECTOMY      TOTAL VAGINAL HYSTERECTOMY       Family History   Problem Relation Age of Onset    Hyperlipidemia Mother     Alzheimer's disease Mother     Hypertension Father     Asbestos Father     Lymphoma Brother     COPD Brother     Heart disease Brother      Social History     Tobacco Use    Smoking status: Former     Current packs/day: 0.00     Types: Cigarettes     Quit date: 1979     Years since quittin.7    Smokeless tobacco: Former   Substance Use Topics    Alcohol use: No    Drug use: Never     Review of Systems   Constitutional:  Negative for fever.   HENT:  Negative for congestion, rhinorrhea and sore throat.    Eyes:  Negative for visual disturbance.   Respiratory:  Negative for cough and shortness of breath.    Cardiovascular:  Negative for chest pain and leg swelling.   Gastrointestinal:  Negative for abdominal pain, diarrhea, nausea and vomiting.   Genitourinary:  Negative for dysuria and hematuria.   Neurological:  Negative for weakness.   Psychiatric/Behavioral:  Positive for hallucinations. Negative for suicidal ideas. The patient is not nervous/anxious.        Physical Exam     Initial Vitals [10/06/23 1813]   BP Pulse Resp Temp SpO2   (!) 155/70 62 16 98.5 °F (36.9 °C) 100 %      MAP       --         Physical Exam    Constitutional: She appears well-developed and well-nourished. She is cooperative.  Non-toxic appearance. She does not appear ill.   HENT:   Head: Normocephalic and atraumatic.       Mouth/Throat: Mucous membranes are normal. Mucous membranes are not dry.   Eyes: Conjunctivae are normal. Pupils are equal, round, and reactive to light.   Neck: Trachea normal and phonation normal.   Cardiovascular:  Normal rate, regular rhythm, normal heart sounds, intact distal pulses and normal pulses.     Exam reveals no gallop, no S3, no S4 and no  friction rub.       No murmur heard.  Pulmonary/Chest: Breath sounds normal. No respiratory distress.   Abdominal: Abdomen is soft. She exhibits no distension. There is no abdominal tenderness.   Musculoskeletal:      Right lower leg: No edema.      Left lower leg: No edema.     Neurological: She is alert.   Skin: Skin is warm, dry and intact. Capillary refill takes less than 2 seconds.        Psychiatric: She has a normal mood and affect. Her speech is normal and behavior is normal. She is actively hallucinating. Thought content is not paranoid. She expresses no homicidal and no suicidal ideation.         ED Course   Procedures  Labs Reviewed   CBC W/ AUTO DIFFERENTIAL   COMPREHENSIVE METABOLIC PANEL   TSH   URINALYSIS, REFLEX TO URINE CULTURE   DRUG SCREEN PANEL, URINE EMERGENCY   ALCOHOL,MEDICAL (ETHANOL)   ACETAMINOPHEN LEVEL          Imaging Results    None          Medications - No data to display  Medical Decision Making    68-year-old female with history of bipolar disorder presenting for medical clearance.  Initially, patient is hemodynamically stable and well-appearing.     At this time, patient does not appear gravely disabled requiring pec.  Will conduct so that patient may be voluntarily admitted to [facility] for further management.   Patient does have some bruising to her face and back but those are old bruises from her fall before her previous admission.  No new injuries  noted at this time and no additional since admission. no additional imaging indicated at this time.      Discussed patient's case with oncoming patient medical clearance.   There is an abnormality in labs, they should be addressed but workup is negative, she is appropriate  to present to [facility] for further psychiatric management.    Amount and/or Complexity of Data Reviewed  Labs: ordered.                               Clinical Impression:   Final diagnoses:  [R44.3] Hallucinations (Primary)               Sandra Alejandra,  MD  Resident  10/06/23 8902

## 2023-10-07 NOTE — DISCHARGE INSTRUCTIONS
Please present to [facility] for voluntary admission.        Follow-Up Plan:  - Follow-up with primary care doctor within 3 - 5 days  - Additional testing and/or evaluation as directed by your primary doctor    Return to the Emergency Department for symptoms including but not limited to: worsening symptoms, shortness of breath or chest pain, vomiting with inability to hold down fluids, fevers greater than 100.4°F, passing out/fainting/unconsciousness, or other concerning symptoms.

## 2023-10-07 NOTE — ED TRIAGE NOTES
Elly Harris, an 68 y.o. female presents to the ED from [facility] for medication clearance. + Auditory clearance and visual hallucinations. No command hallucinations.       Chief Complaint   Patient presents with    Hallucinations     Sent from [facility] for medication clearance. Auditory and visual halluincations     Review of patient's allergies indicates:   Allergen Reactions    Doxycycline Rash and Hives     Past Medical History:   Diagnosis Date    Anemia     Depression     Fibromyalgia     GERD (gastroesophageal reflux disease)     Herpes zoster infection of thoracic region 2/17/2014    left    Hyperlipidemia     Hypertension     Migraine headache     RLS (restless legs syndrome)         Adult Physical Assessment  LOC: Elly Harris, 68 y.o. female verified via two identifiers.  The patient is awake, alert, oriented and speaking appropriately at this time.  APPEARANCE: Patient resting comfortably and appears to be in no acute distress at this time. Patient is clean and well groomed, patient's clothing is properly fastened.  SKIN:The skin is warm and dry, color consistent with ethnicity, patient has normal skin turgor and moist mucus membranes, skin intact, no breakdown. Small bruise on left arm.  MUSCULOSKELETAL: Patient moving all extremities well, no obvious swelling or deformities noted.  RESPIRATORY: Airway is open and patent, respirations are spontaneous, patient has a normal effort and rate, no accessory muscle use noted.  CARDIAC: Patient has a normal rate and rhythm, no periphreal edema noted in any extremity, capillary refill < 3 seconds in all extremities  ABDOMEN: Soft and non tender to palpation, no abdominal distention noted.   NEUROLOGIC: Eyes open spontaneously, behavior appropriate to situation, follows commands, facial expression symmetrical, bilateral hand grasp equal and even, purposeful motor response noted, normal sensation in all extremities when touched with a finger.

## 2023-10-10 ENCOUNTER — DOCUMENTATION ONLY (OUTPATIENT)
Dept: REHABILITATION | Facility: HOSPITAL | Age: 68
End: 2023-10-10

## 2023-10-19 ENCOUNTER — OUTPATIENT CASE MANAGEMENT (OUTPATIENT)
Dept: ADMINISTRATIVE | Facility: OTHER | Age: 68
End: 2023-10-19
Payer: MEDICARE

## 2023-10-25 ENCOUNTER — OUTPATIENT CASE MANAGEMENT (OUTPATIENT)
Dept: ADMINISTRATIVE | Facility: OTHER | Age: 68
End: 2023-10-25
Payer: MEDICARE

## 2023-12-25 PROBLEM — N17.9 ACUTE KIDNEY INJURY: Status: RESOLVED | Noted: 2023-09-23 | Resolved: 2023-12-25

## 2024-01-03 NOTE — HIM RECORD RETIREMENT NOTE
skilled nursing of Incomplete Medical Record    1/3/24    Patient Name: Elly Harris  Contact Serial # (CSN): [id number]  Patient Medical Record # (MRN): [id number]  Date of Service: Hospital Encounter on 9/22/2023  Physician Name: Alondra Corrigan CRNA [[id number]     This record has been reviewed and is being retired as incomplete by the approval of the  Medical Staff Operating Committee (MSOC)     On 12/6/2023., due to:  Unavailability of Provider     Missing Information/Comments:  []    Discharge Summary   []    DC Note/Short Stay Summary   []    ED Provider Note   []    Delivery Note   []    History & Physical   []   Operative Note   []     Procedure Note   []     Physician Order   []     Verbal Order   [x]       Other, specify: Signature on Consultation

## 2024-04-26 NOTE — TELEPHONE ENCOUNTER
Care Due:                  Date            Visit Type   Department     Provider  --------------------------------------------------------------------------------                                EP -                              PRIMARY      [facility] PRIMARY  Last Visit: 09-      CARE ([facility])   HOANG Preston  Next Visit: None Scheduled  None         None Found                                                            Last  Test          Frequency    Reason                     Performed    Due Date  --------------------------------------------------------------------------------    Office Visit  12 months..  atorvastatin,              09- 09-                             losartan-hydrochlorothiaz                             parvin, metoprolol..........    CMP.........  12 months..  atorvastatin,              Not Found    Overdue                             losartan-hydrochlorothiaz                             parvin......................    Lipid Panel.  12 months..  atorvastatin.............  Not Found    Overdue    [employer] Embedded Care Gaps. Reference number: [id number]. 3/29/2023   4:55:16 AM CDT  
yes

## 2024-06-10 ENCOUNTER — PATIENT MESSAGE (OUTPATIENT)
Dept: INTERNAL MEDICINE | Facility: CLINIC | Age: 69
End: 2024-06-10
Payer: MEDICARE

## 2025-01-16 DIAGNOSIS — I10 ESSENTIAL HYPERTENSION: ICD-10-CM

## (undated) DEVICE — TIP YANKAUERS BULB NO VENT

## (undated) DEVICE — SYR B-D DISP CONTROL 10CC100/C

## (undated) DEVICE — DRESSING TRANS 4X4 TEGADERM

## (undated) DEVICE — SKIN MARKER DEVON 160

## (undated) DEVICE — HANDLE EZ 3641

## (undated) DEVICE — GLOVE BIOGEL SKINSENSE PI 8.0

## (undated) DEVICE — SEE MEDLINE ITEM 156934

## (undated) DEVICE — GLOVE BIOGEL SKINSENSE PI 7.5

## (undated) DEVICE — POSITIONER IV ARMBOARD FOAM

## (undated) DEVICE — SKINMARKER & RULER REGULAR X-F

## (undated) DEVICE — HEMOSTAT SURGICEL 2X3IN

## (undated) DEVICE — POSITIONER HEAD DONUT 9IN FOAM

## (undated) DEVICE — SUT SILK 2-0 BLK BR KS 30 I

## (undated) DEVICE — SEE MEDLINE ITEM 157110

## (undated) DEVICE — CORD BIPOLAR 12 FOOT

## (undated) DEVICE — PENCIL ELECTROSURG HOLST W/BLD

## (undated) DEVICE — KIT SINUS ENDOSCOPY PACKNG

## (undated) DEVICE — SUT SILK 2-0 BLK BR FSL 18

## (undated) DEVICE — POSITIONER HEEL FOAM CONVOLTD

## (undated) DEVICE — APPLICATOR COTTON TIP 6IN STRL

## (undated) DEVICE — SOL NACL STRL BOTTLE 1000ML

## (undated) DEVICE — CONTAINER SPECIMEN OR STER 4OZ

## (undated) DEVICE — SUT 4/0 18IN CHROMIC GUT PS

## (undated) DEVICE — ELECTRODE REM PLYHSV RETURN 9

## (undated) DEVICE — SUT PLN GUT 4-0 SC-1SC-1 1

## (undated) DEVICE — DRAPE SURG W/TWL 17 5/8X23

## (undated) DEVICE — SPLINT NASAL PRECUT

## (undated) DEVICE — CONTAINER SPECIMEN STRL 4OZ

## (undated) DEVICE — GLOVE BIOGEL SKINSENSE PI 6.5

## (undated) DEVICE — SOL NACL 0.9% INJ 500ML BG

## (undated) DEVICE — UNDERGLOVES BIOGEL PI SZ 7 LF

## (undated) DEVICE — UNDERGLOVE BIOGEL PI SZ 6.5 LF

## (undated) DEVICE — SOL PVP-I SCRUB 7.5% 4OZ

## (undated) DEVICE — DRESSING TEGADERM 2 3/8 X 2.75

## (undated) DEVICE — SEE MEDLINE ITEM 153151

## (undated) DEVICE — TUBE SUMP NASOGASTRIC 16FR

## (undated) DEVICE — SEE MEDLINE ITEM 152622

## (undated) DEVICE — SOL 9P NACL IRR PIC IL

## (undated) DEVICE — DRAPE STERI INSTRUMENT 1018

## (undated) DEVICE — GLOVE BIOGEL SKINSENSE PI 7.0

## (undated) DEVICE — SUT 4-0 CHROMIC GUT / P-3

## (undated) DEVICE — Device